# Patient Record
Sex: FEMALE | Race: WHITE | Employment: UNEMPLOYED | URBAN - METROPOLITAN AREA
[De-identification: names, ages, dates, MRNs, and addresses within clinical notes are randomized per-mention and may not be internally consistent; named-entity substitution may affect disease eponyms.]

---

## 2020-04-21 ENCOUNTER — HOSPITAL ENCOUNTER (INPATIENT)
Age: 25
LOS: 3 days | Discharge: HOME | DRG: 566 | End: 2020-04-24
Attending: EMERGENCY MEDICINE | Admitting: FAMILY MEDICINE
Payer: MEDICARE

## 2020-04-21 PROBLEM — R11.2 NAUSEA AND VOMITING: Status: ACTIVE | Noted: 2020-04-21

## 2020-04-21 LAB
ALBUMIN SERPL-MCNC: 4.3 GM/DL (ref 3.4–5)
ALP BLD-CCNC: 70 IU/L (ref 40–129)
ALT SERPL-CCNC: 22 U/L (ref 10–40)
ANION GAP SERPL CALCULATED.3IONS-SCNC: 16 MMOL/L (ref 4–16)
AST SERPL-CCNC: 28 IU/L (ref 15–37)
BANDED NEUTROPHILS ABSOLUTE COUNT: 0.63 K/CU MM
BANDED NEUTROPHILS RELATIVE PERCENT: 4 % (ref 5–11)
BILIRUB SERPL-MCNC: 0.4 MG/DL (ref 0–1)
BUN BLDV-MCNC: 11 MG/DL (ref 6–23)
CALCIUM SERPL-MCNC: 9.3 MG/DL (ref 8.3–10.6)
CHLORIDE BLD-SCNC: 101 MMOL/L (ref 99–110)
CO2: 17 MMOL/L (ref 21–32)
CREAT SERPL-MCNC: 0.6 MG/DL (ref 0.6–1.1)
DIFFERENTIAL TYPE: ABNORMAL
GFR AFRICAN AMERICAN: >60 ML/MIN/1.73M2
GFR NON-AFRICAN AMERICAN: >60 ML/MIN/1.73M2
GLUCOSE BLD-MCNC: 138 MG/DL (ref 70–99)
GONADOTROPIN, CHORIONIC (HCG) QUANT: NORMAL UIU/ML
HCT VFR BLD CALC: 40.6 % (ref 37–47)
HEMOGLOBIN: 13.8 GM/DL (ref 12.5–16)
LIPASE: 32 IU/L (ref 13–60)
LYMPHOCYTES ABSOLUTE: 0.6 K/CU MM
LYMPHOCYTES RELATIVE PERCENT: 4 % (ref 24–44)
MCH RBC QN AUTO: 31.5 PG (ref 27–31)
MCHC RBC AUTO-ENTMCNC: 34 % (ref 32–36)
MCV RBC AUTO: 92.7 FL (ref 78–100)
MONOCYTES ABSOLUTE: 0.8 K/CU MM
MONOCYTES RELATIVE PERCENT: 5 % (ref 0–4)
PDW BLD-RTO: 13.3 % (ref 11.7–14.9)
PLATELET # BLD: 240 K/CU MM (ref 140–440)
PMV BLD AUTO: 11.6 FL (ref 7.5–11.1)
POTASSIUM SERPL-SCNC: 4.1 MMOL/L (ref 3.5–5.1)
RBC # BLD: 4.38 M/CU MM (ref 4.2–5.4)
RBC # BLD: SLIGHT 10*6/UL
SEGMENTED NEUTROPHILS ABSOLUTE COUNT: 13.8 K/CU MM
SEGMENTED NEUTROPHILS RELATIVE PERCENT: 87 % (ref 36–66)
SODIUM BLD-SCNC: 134 MMOL/L (ref 135–145)
TOTAL PROTEIN: 6.9 GM/DL (ref 6.4–8.2)
TROPONIN T: <0.01 NG/ML
TROPONIN T: <0.01 NG/ML
WBC # BLD: 15.8 K/CU MM (ref 4–10.5)
WBC # BLD: ABNORMAL 10*3/UL

## 2020-04-21 PROCEDURE — 6360000002 HC RX W HCPCS: Performed by: INTERNAL MEDICINE

## 2020-04-21 PROCEDURE — 6370000000 HC RX 637 (ALT 250 FOR IP): Performed by: OBSTETRICS & GYNECOLOGY

## 2020-04-21 PROCEDURE — 6360000002 HC RX W HCPCS: Performed by: OBSTETRICS & GYNECOLOGY

## 2020-04-21 PROCEDURE — 6360000002 HC RX W HCPCS: Performed by: EMERGENCY MEDICINE

## 2020-04-21 PROCEDURE — 99285 EMERGENCY DEPT VISIT HI MDM: CPT

## 2020-04-21 PROCEDURE — 2000000000 HC ICU R&B

## 2020-04-21 PROCEDURE — 84484 ASSAY OF TROPONIN QUANT: CPT

## 2020-04-21 PROCEDURE — 93005 ELECTROCARDIOGRAM TRACING: CPT | Performed by: EMERGENCY MEDICINE

## 2020-04-21 PROCEDURE — 2580000003 HC RX 258: Performed by: INTERNAL MEDICINE

## 2020-04-21 PROCEDURE — 6370000000 HC RX 637 (ALT 250 FOR IP): Performed by: EMERGENCY MEDICINE

## 2020-04-21 PROCEDURE — 96372 THER/PROPH/DIAG INJ SC/IM: CPT

## 2020-04-21 PROCEDURE — 93010 ELECTROCARDIOGRAM REPORT: CPT | Performed by: INTERNAL MEDICINE

## 2020-04-21 PROCEDURE — 2580000003 HC RX 258: Performed by: EMERGENCY MEDICINE

## 2020-04-21 PROCEDURE — 85007 BL SMEAR W/DIFF WBC COUNT: CPT

## 2020-04-21 PROCEDURE — 96375 TX/PRO/DX INJ NEW DRUG ADDON: CPT

## 2020-04-21 PROCEDURE — 96365 THER/PROPH/DIAG IV INF INIT: CPT

## 2020-04-21 PROCEDURE — 1200000000 HC SEMI PRIVATE

## 2020-04-21 PROCEDURE — 85027 COMPLETE CBC AUTOMATED: CPT

## 2020-04-21 PROCEDURE — 83690 ASSAY OF LIPASE: CPT

## 2020-04-21 PROCEDURE — 6370000000 HC RX 637 (ALT 250 FOR IP): Performed by: FAMILY MEDICINE

## 2020-04-21 PROCEDURE — 94761 N-INVAS EAR/PLS OXIMETRY MLT: CPT

## 2020-04-21 PROCEDURE — 80053 COMPREHEN METABOLIC PANEL: CPT

## 2020-04-21 PROCEDURE — 84702 CHORIONIC GONADOTROPIN TEST: CPT

## 2020-04-21 RX ORDER — SODIUM CHLORIDE 0.9 % (FLUSH) 0.9 %
10 SYRINGE (ML) INJECTION PRN
Status: DISCONTINUED | OUTPATIENT
Start: 2020-04-21 | End: 2020-04-24 | Stop reason: HOSPADM

## 2020-04-21 RX ORDER — LANOLIN ALCOHOL/MO/W.PET/CERES
25 CREAM (GRAM) TOPICAL 2 TIMES DAILY
Status: DISCONTINUED | OUTPATIENT
Start: 2020-04-21 | End: 2020-04-24 | Stop reason: HOSPADM

## 2020-04-21 RX ORDER — SODIUM CHLORIDE, SODIUM LACTATE, POTASSIUM CHLORIDE, AND CALCIUM CHLORIDE .6; .31; .03; .02 G/100ML; G/100ML; G/100ML; G/100ML
1000 INJECTION, SOLUTION INTRAVENOUS ONCE
Status: COMPLETED | OUTPATIENT
Start: 2020-04-21 | End: 2020-04-21

## 2020-04-21 RX ORDER — METOCLOPRAMIDE HYDROCHLORIDE 5 MG/ML
10 INJECTION INTRAMUSCULAR; INTRAVENOUS EVERY 6 HOURS PRN
Status: DISCONTINUED | OUTPATIENT
Start: 2020-04-21 | End: 2020-04-24 | Stop reason: HOSPADM

## 2020-04-21 RX ORDER — LANOLIN ALCOHOL/MO/W.PET/CERES
25 CREAM (GRAM) TOPICAL EVERY 8 HOURS
Status: DISCONTINUED | OUTPATIENT
Start: 2020-04-21 | End: 2020-04-21

## 2020-04-21 RX ORDER — PROMETHAZINE HYDROCHLORIDE 25 MG/ML
25 INJECTION, SOLUTION INTRAMUSCULAR; INTRAVENOUS ONCE
Status: COMPLETED | OUTPATIENT
Start: 2020-04-21 | End: 2020-04-21

## 2020-04-21 RX ORDER — ACETAMINOPHEN 325 MG/1
650 TABLET ORAL EVERY 6 HOURS PRN
Status: DISCONTINUED | OUTPATIENT
Start: 2020-04-21 | End: 2020-04-24 | Stop reason: HOSPADM

## 2020-04-21 RX ORDER — ONDANSETRON 2 MG/ML
4 INJECTION INTRAMUSCULAR; INTRAVENOUS EVERY 6 HOURS PRN
Status: DISCONTINUED | OUTPATIENT
Start: 2020-04-21 | End: 2020-04-24 | Stop reason: HOSPADM

## 2020-04-21 RX ORDER — ONDANSETRON 2 MG/ML
4 INJECTION INTRAMUSCULAR; INTRAVENOUS EVERY 6 HOURS PRN
Status: DISCONTINUED | OUTPATIENT
Start: 2020-04-21 | End: 2020-04-21

## 2020-04-21 RX ORDER — PROMETHAZINE HYDROCHLORIDE 25 MG/ML
12.5 INJECTION, SOLUTION INTRAMUSCULAR; INTRAVENOUS EVERY 6 HOURS PRN
Status: DISCONTINUED | OUTPATIENT
Start: 2020-04-21 | End: 2020-04-21

## 2020-04-21 RX ORDER — POLYETHYLENE GLYCOL 3350 17 G/17G
17 POWDER, FOR SOLUTION ORAL DAILY PRN
Status: DISCONTINUED | OUTPATIENT
Start: 2020-04-21 | End: 2020-04-24 | Stop reason: HOSPADM

## 2020-04-21 RX ORDER — PROCHLORPERAZINE EDISYLATE 5 MG/ML
10 INJECTION INTRAMUSCULAR; INTRAVENOUS EVERY 6 HOURS PRN
Status: DISCONTINUED | OUTPATIENT
Start: 2020-04-21 | End: 2020-04-21

## 2020-04-21 RX ORDER — PROMETHAZINE HYDROCHLORIDE 25 MG/ML
25 INJECTION, SOLUTION INTRAMUSCULAR; INTRAVENOUS EVERY 6 HOURS PRN
Status: DISCONTINUED | OUTPATIENT
Start: 2020-04-21 | End: 2020-04-23

## 2020-04-21 RX ORDER — CALCIUM CARBONATE 200(500)MG
500 TABLET,CHEWABLE ORAL 3 TIMES DAILY PRN
Status: DISCONTINUED | OUTPATIENT
Start: 2020-04-21 | End: 2020-04-24 | Stop reason: HOSPADM

## 2020-04-21 RX ORDER — MAGNESIUM HYDROXIDE/ALUMINUM HYDROXICE/SIMETHICONE 120; 1200; 1200 MG/30ML; MG/30ML; MG/30ML
30 SUSPENSION ORAL ONCE
Status: COMPLETED | OUTPATIENT
Start: 2020-04-21 | End: 2020-04-21

## 2020-04-21 RX ORDER — DIPHENHYDRAMINE HYDROCHLORIDE 50 MG/ML
25 INJECTION INTRAMUSCULAR; INTRAVENOUS ONCE
Status: COMPLETED | OUTPATIENT
Start: 2020-04-21 | End: 2020-04-21

## 2020-04-21 RX ORDER — PROMETHAZINE HYDROCHLORIDE 25 MG/ML
25 INJECTION, SOLUTION INTRAMUSCULAR; INTRAVENOUS EVERY 6 HOURS PRN
Status: DISCONTINUED | OUTPATIENT
Start: 2020-04-21 | End: 2020-04-21

## 2020-04-21 RX ORDER — METOCLOPRAMIDE HYDROCHLORIDE 5 MG/ML
10 INJECTION INTRAMUSCULAR; INTRAVENOUS ONCE
Status: COMPLETED | OUTPATIENT
Start: 2020-04-21 | End: 2020-04-21

## 2020-04-21 RX ORDER — SODIUM CHLORIDE 0.9 % (FLUSH) 0.9 %
10 SYRINGE (ML) INJECTION EVERY 12 HOURS SCHEDULED
Status: DISCONTINUED | OUTPATIENT
Start: 2020-04-21 | End: 2020-04-24 | Stop reason: HOSPADM

## 2020-04-21 RX ORDER — ACETAMINOPHEN 650 MG/1
650 SUPPOSITORY RECTAL EVERY 6 HOURS PRN
Status: DISCONTINUED | OUTPATIENT
Start: 2020-04-21 | End: 2020-04-24 | Stop reason: HOSPADM

## 2020-04-21 RX ORDER — DEXTROSE, SODIUM CHLORIDE, SODIUM LACTATE, POTASSIUM CHLORIDE, AND CALCIUM CHLORIDE 5; .6; .31; .03; .02 G/100ML; G/100ML; G/100ML; G/100ML; G/100ML
INJECTION, SOLUTION INTRAVENOUS CONTINUOUS
Status: DISCONTINUED | OUTPATIENT
Start: 2020-04-21 | End: 2020-04-23

## 2020-04-21 RX ORDER — PROMETHAZINE HYDROCHLORIDE 25 MG/1
12.5 TABLET ORAL EVERY 6 HOURS PRN
Status: DISCONTINUED | OUTPATIENT
Start: 2020-04-21 | End: 2020-04-21

## 2020-04-21 RX ADMIN — DOXYLAMINE SUCCINATE 25 MG: 25 TABLET ORAL at 13:08

## 2020-04-21 RX ADMIN — PROMETHAZINE HYDROCHLORIDE 25 MG: 25 INJECTION INTRAMUSCULAR; INTRAVENOUS at 23:31

## 2020-04-21 RX ADMIN — PROMETHAZINE HYDROCHLORIDE 25 MG: 25 INJECTION INTRAMUSCULAR; INTRAVENOUS at 17:25

## 2020-04-21 RX ADMIN — ONDANSETRON HYDROCHLORIDE 4 MG: 2 SOLUTION INTRAMUSCULAR; INTRAVENOUS at 15:39

## 2020-04-21 RX ADMIN — METOCLOPRAMIDE 10 MG: 5 INJECTION, SOLUTION INTRAMUSCULAR; INTRAVENOUS at 05:09

## 2020-04-21 RX ADMIN — SODIUM CHLORIDE, POTASSIUM CHLORIDE, SODIUM LACTATE AND CALCIUM CHLORIDE 1000 ML: 600; 310; 30; 20 INJECTION, SOLUTION INTRAVENOUS at 05:09

## 2020-04-21 RX ADMIN — ALUMINUM HYDROXIDE, MAGNESIUM HYDROXIDE, AND SIMETHICONE 30 ML: 200; 200; 20 SUSPENSION ORAL at 06:32

## 2020-04-21 RX ADMIN — METOCLOPRAMIDE 10 MG: 5 INJECTION, SOLUTION INTRAMUSCULAR; INTRAVENOUS at 19:07

## 2020-04-21 RX ADMIN — SODIUM CHLORIDE, SODIUM LACTATE, POTASSIUM CHLORIDE, CALCIUM CHLORIDE AND DEXTROSE MONOHYDRATE: 5; 600; 310; 30; 20 INJECTION, SOLUTION INTRAVENOUS at 23:36

## 2020-04-21 RX ADMIN — METOCLOPRAMIDE 10 MG: 5 INJECTION, SOLUTION INTRAMUSCULAR; INTRAVENOUS at 13:08

## 2020-04-21 RX ADMIN — ONDANSETRON HYDROCHLORIDE 4 MG: 2 SOLUTION INTRAMUSCULAR; INTRAVENOUS at 20:58

## 2020-04-21 RX ADMIN — SODIUM CHLORIDE, SODIUM LACTATE, POTASSIUM CHLORIDE, CALCIUM CHLORIDE AND DEXTROSE MONOHYDRATE: 5; 600; 310; 30; 20 INJECTION, SOLUTION INTRAVENOUS at 08:32

## 2020-04-21 RX ADMIN — DOXYLAMINE SUCCINATE 25 MG: 25 TABLET ORAL at 21:07

## 2020-04-21 RX ADMIN — ENOXAPARIN SODIUM 40 MG: 40 INJECTION SUBCUTANEOUS at 10:12

## 2020-04-21 RX ADMIN — ONDANSETRON HYDROCHLORIDE 4 MG: 2 SOLUTION INTRAMUSCULAR; INTRAVENOUS at 10:12

## 2020-04-21 RX ADMIN — DIPHENHYDRAMINE HYDROCHLORIDE 25 MG: 50 INJECTION, SOLUTION INTRAMUSCULAR; INTRAVENOUS at 05:09

## 2020-04-21 RX ADMIN — PROMETHAZINE HYDROCHLORIDE 25 MG: 25 INJECTION INTRAMUSCULAR; INTRAVENOUS at 06:32

## 2020-04-21 RX ADMIN — SODIUM CHLORIDE, SODIUM LACTATE, POTASSIUM CHLORIDE, CALCIUM CHLORIDE AND DEXTROSE MONOHYDRATE: 5; 600; 310; 30; 20 INJECTION, SOLUTION INTRAVENOUS at 15:39

## 2020-04-21 RX ADMIN — Medication 25 MG: at 10:12

## 2020-04-21 RX ADMIN — Medication 25 MG: at 21:07

## 2020-04-21 ASSESSMENT — PAIN DESCRIPTION - PROGRESSION
CLINICAL_PROGRESSION: NOT CHANGED
CLINICAL_PROGRESSION: NOT CHANGED

## 2020-04-21 ASSESSMENT — PAIN DESCRIPTION - LOCATION
LOCATION: ABDOMEN
LOCATION: ABDOMEN
LOCATION: CHEST

## 2020-04-21 ASSESSMENT — PAIN SCALES - GENERAL
PAINLEVEL_OUTOF10: 6
PAINLEVEL_OUTOF10: 0
PAINLEVEL_OUTOF10: 7
PAINLEVEL_OUTOF10: 4
PAINLEVEL_OUTOF10: 0

## 2020-04-21 ASSESSMENT — PAIN DESCRIPTION - ORIENTATION
ORIENTATION: RIGHT;LEFT;MID;LOWER
ORIENTATION: RIGHT;LEFT;MID;LOWER

## 2020-04-21 ASSESSMENT — PAIN DESCRIPTION - PAIN TYPE
TYPE: ACUTE PAIN
TYPE: ACUTE PAIN

## 2020-04-21 ASSESSMENT — PAIN DESCRIPTION - FREQUENCY
FREQUENCY: INTERMITTENT
FREQUENCY: INTERMITTENT

## 2020-04-21 ASSESSMENT — PAIN DESCRIPTION - ONSET
ONSET: ON-GOING
ONSET: GRADUAL

## 2020-04-21 ASSESSMENT — PAIN DESCRIPTION - DESCRIPTORS
DESCRIPTORS: CRAMPING
DESCRIPTORS: CRAMPING

## 2020-04-21 ASSESSMENT — PAIN - FUNCTIONAL ASSESSMENT
PAIN_FUNCTIONAL_ASSESSMENT: ACTIVITIES ARE NOT PREVENTED
PAIN_FUNCTIONAL_ASSESSMENT: ACTIVITIES ARE NOT PREVENTED

## 2020-04-21 NOTE — H&P
Intravenous, Continuous, Michael Clayton MD    History of present illness     Chief Complaint: Emesis and Chest Pain      Harjeet Bailey is a 25 y.o.  female  who presents with nausea and vomiting. Pt is  at about 8 weeks with her current pregnancy. Pt states that the symptoms started last night and occurred this morning as well. She denies having this with her first 2 pregnancies. Currently feels a bit better. Review of Systems : Ten point ROS reviewed and negative, unless as noted above per HPI       Objective: Intake/Output Summary (Last 24 hours) at 2020 0820  Last data filed at 2020 4396  Gross per 24 hour   Intake 1000 ml   Output --   Net 1000 ml      Vitals:   Vitals:    20 0505   BP:    Pulse:    Resp:    Temp: 98.2 °F (36.8 °C)   SpO2:      Physical Exam:   Gen:  Slightly nauseated   Head/Eyes:  Normocephalic atraumatic, EOMI   NECK:   symmetrical, trachea midline  LUNGS: Normal Effort   CARDIOVASCULAR:  Normal rate  ABDOMEN:  non distended  MUSCULOSKELETAL:  ROM WNL  NEUROLOGIC: Alert and Oriented,  Cranial nerves II-XII are grossly intact. SKIN:  no bruising or bleeding, normal skin color,  no redness      Past Medical History:    History reviewed. No pertinent past medical history. PSHX:  has a past surgical history that includes Tonsillectomy.     Allergies: No Known Allergies    FAM HX: Reviewed and non-contributory  Soc HX:   Social History     Socioeconomic History    Marital status: Single     Spouse name: None    Number of children: None    Years of education: None    Highest education level: None   Occupational History    None   Social Needs    Financial resource strain: None    Food insecurity     Worry: None     Inability: None    Transportation needs     Medical: None     Non-medical: None   Tobacco Use    Smoking status: Current Every Day Smoker     Packs/day: 0.50     Years: 1.00     Pack years: 0.50    Smokeless tobacco: Never Used   Substance and

## 2020-04-21 NOTE — ED PROVIDER NOTES
Triage Chief Complaint:   Emesis and Chest Pain    [place]:  Neli Alvarado is a 25 y.o. female that presents via EMS for main complaint of nausea vomiting. The patient is a G3, P2 at about 8 weeks. She reports that starting yesterday morning she has had intractable nausea and bilious, nonbloody emesis. She thinks that she may have vomited about 100 times. She was seen at [facility] yesterday and prescribed Zofran but this has not been helping. She did have an ultrasound that showed intrauterine pregnancy with subchorionic hemorrhage. She has been having spotting for the past few days. Denies any large vaginal bleeding. She states that she has some associated cramping epigastric pain. She states that she also woke up this morning at 4 AM with some chest tightness and mild dyspnea. Denies any headache, fever, cough, urinary symptoms, sick contacts or recent travel. EMS reported that the patient's pulse was in the 40s and blood pressures in the 53S systolic in route. They state that she was in and out of consciousness. ROS:  At least 14 systems reviewed and otherwise acutely negative except as in the [address] History reviewed. No pertinent past medical history. Past Surgical History:   Procedure Laterality Date    TONSILLECTOMY       History reviewed. No pertinent family history.   Social History     Socioeconomic History    Marital status: Single     Spouse name: Not on file    Number of children: Not on file    Years of education: Not on file    Highest education level: Not on file   Occupational History    Not on file   Social Needs    Financial resource strain: Not on file    Food insecurity     Worry: Not on file     Inability: Not on file    Transportation needs     Medical: Not on file     Non-medical: Not on file   Tobacco Use    Smoking status: Current Every Day Smoker     Packs/day: 0.50     Years: 1.00     Pack years: 0.50    Smokeless tobacco: Never Used   Substance and Sexual Activity    - 16   Lipase   Result Value Ref Range    Lipase 32 13 - 60 IU/L   Troponin   Result Value Ref Range    Troponin T <0.010 <0.01 NG/ML      Radiographs (if obtained):  [] The following radiograph was interpreted by myself in the absence of a radiologist:  [] Radiologist's Report Reviewed:    EKG (if obtained): (All EKG's are interpreted by myself in the absence of a cardiologist)    MDM:  Plan of care is discussed thoroughly with the patient and family if present. If performed, all imaging and lab work also discussed with patient. All relevant prior results and chart reviewed if available. Patient presents as above. On arrival, the patient has normal vital signs. She has benign abdominal exam.  Main complaint is intractable nausea and vomiting which is likely secondary to hyperemesis of pregnancy. She does have some chest tightness. EKG is nonischemic. Overall symptomatology not consistent with ACS, pulmonary embolism or other acute intrathoracic process. I suspect the chest discomfort could be from repeated episodes of vomiting. Patient started on IV fluids and given Reglan and Benadryl. On reevaluation, the patient states that she feels much better. Vital signs remain normal.  Patient does have leukocytosis which is likely secondary to repeated episodes of vomiting. Remainder of labs are pending at this time. Patient's metabolic work-up is largely unremarkable. Lipase is normal.  Troponin is within normal limits. When I went back to see the patient, she is actively vomiting again. She is additionally given Maalox and Phenergan. I feel that given her intractable vomiting at this point, second hospital visit that she should be admitted for IV fluids and further management. She is agreeable with this plan of care. Clinical Impression:  1.  Vomiting during pregnancy      (Please note that portions of this note may have been completed with a voice recognition program. Efforts were made

## 2020-04-22 LAB
ANION GAP SERPL CALCULATED.3IONS-SCNC: 14 MMOL/L (ref 4–16)
BASOPHILS ABSOLUTE: 0 K/CU MM
BASOPHILS RELATIVE PERCENT: 0.2 % (ref 0–1)
BUN BLDV-MCNC: 8 MG/DL (ref 6–23)
CALCIUM SERPL-MCNC: 8.7 MG/DL (ref 8.3–10.6)
CHLORIDE BLD-SCNC: 106 MMOL/L (ref 99–110)
CO2: 21 MMOL/L (ref 21–32)
CREAT SERPL-MCNC: 0.6 MG/DL (ref 0.6–1.1)
DIFFERENTIAL TYPE: ABNORMAL
EOSINOPHILS ABSOLUTE: 0 K/CU MM
EOSINOPHILS RELATIVE PERCENT: 0 % (ref 0–3)
GFR AFRICAN AMERICAN: >60 ML/MIN/1.73M2
GFR NON-AFRICAN AMERICAN: >60 ML/MIN/1.73M2
GLUCOSE BLD-MCNC: 131 MG/DL (ref 70–99)
HCT VFR BLD CALC: 38.2 % (ref 37–47)
HEMOGLOBIN: 13 GM/DL (ref 12.5–16)
IMMATURE NEUTROPHIL %: 0.3 % (ref 0–0.43)
LYMPHOCYTES ABSOLUTE: 1.3 K/CU MM
LYMPHOCYTES RELATIVE PERCENT: 10.2 % (ref 24–44)
MAGNESIUM: 1.9 MG/DL (ref 1.8–2.4)
MCH RBC QN AUTO: 31.9 PG (ref 27–31)
MCHC RBC AUTO-ENTMCNC: 34 % (ref 32–36)
MCV RBC AUTO: 93.9 FL (ref 78–100)
MONOCYTES ABSOLUTE: 0.5 K/CU MM
MONOCYTES RELATIVE PERCENT: 4 % (ref 0–4)
NUCLEATED RBC %: 0 %
PDW BLD-RTO: 13.4 % (ref 11.7–14.9)
PLATELET # BLD: 193 K/CU MM (ref 140–440)
PMV BLD AUTO: 11.2 FL (ref 7.5–11.1)
POTASSIUM SERPL-SCNC: 3.4 MMOL/L (ref 3.5–5.1)
RBC # BLD: 4.07 M/CU MM (ref 4.2–5.4)
SEGMENTED NEUTROPHILS ABSOLUTE COUNT: 11.3 K/CU MM
SEGMENTED NEUTROPHILS RELATIVE PERCENT: 85.3 % (ref 36–66)
SODIUM BLD-SCNC: 141 MMOL/L (ref 135–145)
TOTAL IMMATURE NEUTOROPHIL: 0.04 K/CU MM
TOTAL NUCLEATED RBC: 0 K/CU MM
TOTAL RETICULOCYTE COUNT: 0.08 K/CU MM
WBC # BLD: 13.2 K/CU MM (ref 4–10.5)

## 2020-04-22 PROCEDURE — 6370000000 HC RX 637 (ALT 250 FOR IP): Performed by: INTERNAL MEDICINE

## 2020-04-22 PROCEDURE — 80048 BASIC METABOLIC PNL TOTAL CA: CPT

## 2020-04-22 PROCEDURE — 36415 COLL VENOUS BLD VENIPUNCTURE: CPT

## 2020-04-22 PROCEDURE — 83735 ASSAY OF MAGNESIUM: CPT

## 2020-04-22 PROCEDURE — 1200000000 HC SEMI PRIVATE

## 2020-04-22 PROCEDURE — 6360000002 HC RX W HCPCS: Performed by: NURSE PRACTITIONER

## 2020-04-22 PROCEDURE — 6370000000 HC RX 637 (ALT 250 FOR IP): Performed by: NURSE PRACTITIONER

## 2020-04-22 PROCEDURE — 6360000002 HC RX W HCPCS: Performed by: OBSTETRICS & GYNECOLOGY

## 2020-04-22 PROCEDURE — 6360000002 HC RX W HCPCS: Performed by: INTERNAL MEDICINE

## 2020-04-22 PROCEDURE — 85025 COMPLETE CBC W/AUTO DIFF WBC: CPT

## 2020-04-22 PROCEDURE — 6370000000 HC RX 637 (ALT 250 FOR IP): Performed by: FAMILY MEDICINE

## 2020-04-22 PROCEDURE — 76937 US GUIDE VASCULAR ACCESS: CPT

## 2020-04-22 PROCEDURE — 94761 N-INVAS EAR/PLS OXIMETRY MLT: CPT

## 2020-04-22 PROCEDURE — 2580000003 HC RX 258: Performed by: INTERNAL MEDICINE

## 2020-04-22 PROCEDURE — 6370000000 HC RX 637 (ALT 250 FOR IP): Performed by: OBSTETRICS & GYNECOLOGY

## 2020-04-22 RX ORDER — POTASSIUM CHLORIDE 20 MEQ/1
40 TABLET, EXTENDED RELEASE ORAL ONCE
Status: COMPLETED | OUTPATIENT
Start: 2020-04-22 | End: 2020-04-22

## 2020-04-22 RX ORDER — DIPHENHYDRAMINE HYDROCHLORIDE 50 MG/ML
12.5 INJECTION INTRAMUSCULAR; INTRAVENOUS ONCE
Status: COMPLETED | OUTPATIENT
Start: 2020-04-22 | End: 2020-04-22

## 2020-04-22 RX ADMIN — METOCLOPRAMIDE 10 MG: 5 INJECTION, SOLUTION INTRAMUSCULAR; INTRAVENOUS at 05:50

## 2020-04-22 RX ADMIN — DIPHENHYDRAMINE HYDROCHLORIDE 12.5 MG: 50 INJECTION INTRAMUSCULAR; INTRAVENOUS at 21:07

## 2020-04-22 RX ADMIN — PROMETHAZINE HYDROCHLORIDE 25 MG: 25 INJECTION INTRAMUSCULAR; INTRAVENOUS at 23:13

## 2020-04-22 RX ADMIN — PROMETHAZINE HYDROCHLORIDE 25 MG: 25 INJECTION INTRAMUSCULAR; INTRAVENOUS at 16:55

## 2020-04-22 RX ADMIN — SODIUM CHLORIDE, SODIUM LACTATE, POTASSIUM CHLORIDE, CALCIUM CHLORIDE AND DEXTROSE MONOHYDRATE: 5; 600; 310; 30; 20 INJECTION, SOLUTION INTRAVENOUS at 08:59

## 2020-04-22 RX ADMIN — METOCLOPRAMIDE 10 MG: 5 INJECTION, SOLUTION INTRAMUSCULAR; INTRAVENOUS at 18:34

## 2020-04-22 RX ADMIN — PROMETHAZINE HYDROCHLORIDE 25 MG: 25 INJECTION INTRAMUSCULAR; INTRAVENOUS at 10:55

## 2020-04-22 RX ADMIN — Medication 25 MG: at 08:35

## 2020-04-22 RX ADMIN — ENOXAPARIN SODIUM 40 MG: 40 INJECTION SUBCUTANEOUS at 08:35

## 2020-04-22 RX ADMIN — DOXYLAMINE SUCCINATE 25 MG: 25 TABLET ORAL at 08:35

## 2020-04-22 RX ADMIN — CALCIUM CARBONATE 500 MG: 500 TABLET, CHEWABLE ORAL at 00:37

## 2020-04-22 RX ADMIN — ONDANSETRON HYDROCHLORIDE 4 MG: 2 SOLUTION INTRAMUSCULAR; INTRAVENOUS at 02:35

## 2020-04-22 RX ADMIN — METOCLOPRAMIDE 10 MG: 5 INJECTION, SOLUTION INTRAMUSCULAR; INTRAVENOUS at 12:00

## 2020-04-22 RX ADMIN — BENZOCAINE AND MENTHOL 1 LOZENGE: 15; 3.6 LOZENGE ORAL at 05:50

## 2020-04-22 RX ADMIN — METOCLOPRAMIDE 10 MG: 5 INJECTION, SOLUTION INTRAMUSCULAR; INTRAVENOUS at 00:37

## 2020-04-22 RX ADMIN — POTASSIUM CHLORIDE 40 MEQ: 1500 TABLET, EXTENDED RELEASE ORAL at 08:54

## 2020-04-22 RX ADMIN — ONDANSETRON HYDROCHLORIDE 4 MG: 2 SOLUTION INTRAMUSCULAR; INTRAVENOUS at 20:56

## 2020-04-22 RX ADMIN — CALCIUM CARBONATE 500 MG: 500 TABLET, CHEWABLE ORAL at 19:35

## 2020-04-22 RX ADMIN — ONDANSETRON HYDROCHLORIDE 4 MG: 2 SOLUTION INTRAMUSCULAR; INTRAVENOUS at 08:35

## 2020-04-22 RX ADMIN — SODIUM CHLORIDE, PRESERVATIVE FREE 10 ML: 5 INJECTION INTRAVENOUS at 09:00

## 2020-04-22 RX ADMIN — PROMETHAZINE HYDROCHLORIDE 25 MG: 25 INJECTION INTRAMUSCULAR; INTRAVENOUS at 04:51

## 2020-04-22 RX ADMIN — ONDANSETRON HYDROCHLORIDE 4 MG: 2 SOLUTION INTRAMUSCULAR; INTRAVENOUS at 14:46

## 2020-04-22 RX ADMIN — ACETAMINOPHEN 650 MG: 650 SUPPOSITORY RECTAL at 21:54

## 2020-04-22 ASSESSMENT — PAIN SCALES - GENERAL
PAINLEVEL_OUTOF10: 7
PAINLEVEL_OUTOF10: 0
PAINLEVEL_OUTOF10: 6
PAINLEVEL_OUTOF10: 7

## 2020-04-22 NOTE — PROGRESS NOTES
Hospitalist Progress Note      Name:  Shannan Jane /Age/Sex: 1995  (25 y.o. female)   MRN & CSN:  [id number] & [id number] Admission Date/Time: 2020  5:02 AM   Location:  -A PCP: No primary care provider on file. Shannan Jane is a 25 y.o.  female  who presents with Emesis and Chest Pain      Assessment and Plan:   Hyperemesis Gravidarum, , GA approx 8 weeks  - pyridoxine + doxylamine  - IVF decrease  - advance to full liquids today  - antiemetics prn  - ob/gyn consulted     Chest Pain   -stable  - likely 2/2 to vomiting  - EKG non acute  - trend trops: neg    HypoK  - replace            Diet DIET FULL LIQUID;   Code Status Full Code     Medications:   Medications:    sodium chloride flush  10 mL Intravenous 2 times per day    enoxaparin  40 mg Subcutaneous Daily    doxyLAMINE succinate  25 mg Oral BID    pyridoxine  25 mg Oral BID      Infusions:    dextrose 5% in lactated ringers 125 mL/hr at 20 0859     PRN Meds: benzocaine-menthol, 1 lozenge, Q2H PRN  sodium chloride flush, 10 mL, PRN  acetaminophen, 650 mg, Q6H PRN    Or  acetaminophen, 650 mg, Q6H PRN  polyethylene glycol, 17 g, Daily PRN  metoclopramide, 10 mg, Q6H PRN  promethazine, 25 mg, Q6H PRN  ondansetron, 4 mg, Q6H PRN  calcium carbonate, 500 mg, TID PRN      Subjective:     States she still feels nauseated   Objective:        Intake/Output Summary (Last 24 hours) at 2020 0947  Last data filed at 2020 0600  Gross per 24 hour   Intake 3311 ml   Output 375 ml   Net 2936 ml      Vitals:   Vitals:    20 0844   BP: 113/61   Pulse: 66   Resp: 19   Temp: 98.6 °F (37 °C)   SpO2: 97%     Physical Exam:   Gen:  awake, alert, no apparent distress  Head/Eyes:  Normocephalic atraumatic, EOMI   NECK:   symmetrical, trachea midline  LUNGS: Normal Effort   CARDIOVASCULAR:  Normal rate  ABDOMEN:  non distended  MUSCULOSKELETAL:  ROM WNL  NEUROLOGIC: Alert and Oriented,  Cranial nerves II-XII are grossly

## 2020-04-23 LAB
ANION GAP SERPL CALCULATED.3IONS-SCNC: 13 MMOL/L (ref 4–16)
BUN BLDV-MCNC: 8 MG/DL (ref 6–23)
CALCIUM SERPL-MCNC: 9 MG/DL (ref 8.3–10.6)
CHLORIDE BLD-SCNC: 102 MMOL/L (ref 99–110)
CO2: 21 MMOL/L (ref 21–32)
CREAT SERPL-MCNC: 0.7 MG/DL (ref 0.6–1.1)
GFR AFRICAN AMERICAN: >60 ML/MIN/1.73M2
GFR NON-AFRICAN AMERICAN: >60 ML/MIN/1.73M2
GLUCOSE BLD-MCNC: 100 MG/DL (ref 70–99)
MAGNESIUM: 1.9 MG/DL (ref 1.8–2.4)
POTASSIUM SERPL-SCNC: 3.3 MMOL/L (ref 3.5–5.1)
SODIUM BLD-SCNC: 136 MMOL/L (ref 135–145)

## 2020-04-23 PROCEDURE — 2580000003 HC RX 258: Performed by: FAMILY MEDICINE

## 2020-04-23 PROCEDURE — 6360000002 HC RX W HCPCS: Performed by: NURSE PRACTITIONER

## 2020-04-23 PROCEDURE — 1200000000 HC SEMI PRIVATE

## 2020-04-23 PROCEDURE — 6360000002 HC RX W HCPCS: Performed by: OBSTETRICS & GYNECOLOGY

## 2020-04-23 PROCEDURE — 2580000003 HC RX 258: Performed by: INTERNAL MEDICINE

## 2020-04-23 PROCEDURE — 6370000000 HC RX 637 (ALT 250 FOR IP): Performed by: OBSTETRICS & GYNECOLOGY

## 2020-04-23 PROCEDURE — 36415 COLL VENOUS BLD VENIPUNCTURE: CPT

## 2020-04-23 PROCEDURE — 94761 N-INVAS EAR/PLS OXIMETRY MLT: CPT

## 2020-04-23 PROCEDURE — 6370000000 HC RX 637 (ALT 250 FOR IP): Performed by: NURSE PRACTITIONER

## 2020-04-23 PROCEDURE — 80048 BASIC METABOLIC PNL TOTAL CA: CPT

## 2020-04-23 PROCEDURE — 6360000002 HC RX W HCPCS: Performed by: INTERNAL MEDICINE

## 2020-04-23 PROCEDURE — 2500000003 HC RX 250 WO HCPCS: Performed by: FAMILY MEDICINE

## 2020-04-23 PROCEDURE — 83735 ASSAY OF MAGNESIUM: CPT

## 2020-04-23 RX ORDER — DEXTROSE, SODIUM CHLORIDE, AND POTASSIUM CHLORIDE 5; .45; .3 G/100ML; G/100ML; G/100ML
INJECTION INTRAVENOUS CONTINUOUS
Status: DISCONTINUED | OUTPATIENT
Start: 2020-04-23 | End: 2020-04-24

## 2020-04-23 RX ORDER — DIPHENHYDRAMINE HYDROCHLORIDE 50 MG/ML
12.5 INJECTION INTRAMUSCULAR; INTRAVENOUS EVERY 6 HOURS PRN
Status: DISCONTINUED | OUTPATIENT
Start: 2020-04-23 | End: 2020-04-24 | Stop reason: HOSPADM

## 2020-04-23 RX ORDER — DIPHENHYDRAMINE HYDROCHLORIDE 50 MG/ML
12.5 INJECTION INTRAMUSCULAR; INTRAVENOUS ONCE
Status: COMPLETED | OUTPATIENT
Start: 2020-04-23 | End: 2020-04-23

## 2020-04-23 RX ORDER — PROCHLORPERAZINE EDISYLATE 5 MG/ML
10 INJECTION INTRAMUSCULAR; INTRAVENOUS EVERY 6 HOURS PRN
Status: DISCONTINUED | OUTPATIENT
Start: 2020-04-23 | End: 2020-04-24 | Stop reason: HOSPADM

## 2020-04-23 RX ADMIN — PROMETHAZINE HYDROCHLORIDE 25 MG: 25 INJECTION INTRAMUSCULAR; INTRAVENOUS at 05:07

## 2020-04-23 RX ADMIN — CALCIUM CARBONATE 500 MG: 500 TABLET, CHEWABLE ORAL at 02:50

## 2020-04-23 RX ADMIN — SODIUM CHLORIDE, PRESERVATIVE FREE 10 ML: 5 INJECTION INTRAVENOUS at 22:14

## 2020-04-23 RX ADMIN — ONDANSETRON HYDROCHLORIDE 4 MG: 2 SOLUTION INTRAMUSCULAR; INTRAVENOUS at 17:17

## 2020-04-23 RX ADMIN — METOCLOPRAMIDE 10 MG: 5 INJECTION, SOLUTION INTRAMUSCULAR; INTRAVENOUS at 11:37

## 2020-04-23 RX ADMIN — PROMETHAZINE HYDROCHLORIDE 25 MG: 25 INJECTION INTRAMUSCULAR; INTRAVENOUS at 17:03

## 2020-04-23 RX ADMIN — POTASSIUM CHLORIDE, DEXTROSE MONOHYDRATE AND SODIUM CHLORIDE: 300; 5; 450 INJECTION, SOLUTION INTRAVENOUS at 10:59

## 2020-04-23 RX ADMIN — SODIUM CHLORIDE, SODIUM LACTATE, POTASSIUM CHLORIDE, CALCIUM CHLORIDE AND DEXTROSE MONOHYDRATE: 5; 600; 310; 30; 20 INJECTION, SOLUTION INTRAVENOUS at 00:43

## 2020-04-23 RX ADMIN — ONDANSETRON HYDROCHLORIDE 4 MG: 2 SOLUTION INTRAMUSCULAR; INTRAVENOUS at 23:14

## 2020-04-23 RX ADMIN — CALCIUM CARBONATE 500 MG: 500 TABLET, CHEWABLE ORAL at 10:43

## 2020-04-23 RX ADMIN — DOXYLAMINE SUCCINATE 25 MG: 25 TABLET ORAL at 11:37

## 2020-04-23 RX ADMIN — PROCHLORPERAZINE EDISYLATE 10 MG: 5 INJECTION, SOLUTION INTRAMUSCULAR; INTRAVENOUS at 22:14

## 2020-04-23 RX ADMIN — ONDANSETRON HYDROCHLORIDE 4 MG: 2 SOLUTION INTRAMUSCULAR; INTRAVENOUS at 08:42

## 2020-04-23 RX ADMIN — DIPHENHYDRAMINE HYDROCHLORIDE 12.5 MG: 50 INJECTION, SOLUTION INTRAMUSCULAR; INTRAVENOUS at 22:14

## 2020-04-23 RX ADMIN — METOCLOPRAMIDE 10 MG: 5 INJECTION, SOLUTION INTRAMUSCULAR; INTRAVENOUS at 05:28

## 2020-04-23 RX ADMIN — ONDANSETRON HYDROCHLORIDE 4 MG: 2 SOLUTION INTRAMUSCULAR; INTRAVENOUS at 14:43

## 2020-04-23 RX ADMIN — METOCLOPRAMIDE 10 MG: 5 INJECTION, SOLUTION INTRAMUSCULAR; INTRAVENOUS at 20:49

## 2020-04-23 RX ADMIN — CALCIUM CARBONATE 500 MG: 500 TABLET, CHEWABLE ORAL at 14:18

## 2020-04-23 RX ADMIN — SODIUM CHLORIDE, PRESERVATIVE FREE 10 ML: 5 INJECTION INTRAVENOUS at 10:38

## 2020-04-23 RX ADMIN — METOCLOPRAMIDE 10 MG: 5 INJECTION, SOLUTION INTRAMUSCULAR; INTRAVENOUS at 00:36

## 2020-04-23 RX ADMIN — DIPHENHYDRAMINE HYDROCHLORIDE 12.5 MG: 50 INJECTION INTRAMUSCULAR; INTRAVENOUS at 03:35

## 2020-04-23 RX ADMIN — ENOXAPARIN SODIUM 40 MG: 40 INJECTION SUBCUTANEOUS at 11:37

## 2020-04-23 RX ADMIN — PROMETHAZINE HYDROCHLORIDE 25 MG: 25 INJECTION INTRAMUSCULAR; INTRAVENOUS at 11:08

## 2020-04-23 RX ADMIN — ONDANSETRON HYDROCHLORIDE 4 MG: 2 SOLUTION INTRAMUSCULAR; INTRAVENOUS at 02:47

## 2020-04-23 RX ADMIN — SODIUM CHLORIDE, PRESERVATIVE FREE 10 ML: 5 INJECTION INTRAVENOUS at 20:49

## 2020-04-23 ASSESSMENT — PAIN SCALES - GENERAL: PAINLEVEL_OUTOF10: 0

## 2020-04-23 NOTE — PLAN OF CARE
Problem: Discharge Planning:  Goal: Participates in care planning  Description: Participates in care planning  4/23/2020 1212 by Darci Reyes RN  Outcome: Ongoing  4/23/2020 0257 by Tarun Anaya RN  Outcome: Ongoing  Goal: Discharged to appropriate level of care  Description: Discharged to appropriate level of care  4/23/2020 1212 by Darci Reyes RN  Outcome: Ongoing  4/23/2020 0257 by Tarun Anaya RN  Outcome: Ongoing     Problem:  Activity Intolerance:  Goal: Ability to tolerate increased activity will improve  Description: Ability to tolerate increased activity will improve  4/23/2020 1212 by Darci Reyes RN  Outcome: Ongoing  4/23/2020 0257 by Tarun Anaya RN  Outcome: Ongoing     Problem: Anxiety/Stress:  Goal: Level of anxiety will decrease  Description: Level of anxiety will decrease  4/23/2020 1212 by Darci Reyes RN  Outcome: Ongoing  4/23/2020 0257 by Tarun Anaya RN  Outcome: Ongoing     Problem: Fluid Volume - Deficit:  Goal: Absence of fluid volume deficit signs and symptoms  Description: Absence of fluid volume deficit signs and symptoms  4/23/2020 1212 by Darci Reyes RN  Outcome: Ongoing  4/23/2020 0257 by Tarun Anaya RN  Outcome: Ongoing  Goal: Electrolytes within specified parameters  Description: Electrolytes within specified parameters  4/23/2020 1212 by Darci Reyes RN  Outcome: Ongoing  4/23/2020 0257 by Tarun Anaya RN  Outcome: Ongoing     Problem: Fluid Volume - Deficit:  Goal: Absence of fluid volume deficit signs and symptoms  Description: Absence of fluid volume deficit signs and symptoms  4/23/2020 1212 by Darci Reyes RN  Outcome: Ongoing  4/23/2020 0257 by Tarun Anaya RN  Outcome: Ongoing  Goal: Electrolytes within specified parameters  Description: Electrolytes within specified parameters  4/23/2020 1212 by Darci Reyes RN  Outcome: Ongoing  4/23/2020 0257 by Tarun Anaya RN  Outcome: Ongoing     Problem: Pain:  Goal: Pain level will decrease  Description: Pain level will decrease  4/23/2020 1212 by Sharla Britt RN  Outcome: Ongoing  4/23/2020 0257 by Bev Delarosa RN  Outcome: Ongoing  Goal: Control of acute pain  Description: Control of acute pain  4/23/2020 1212 by Sharla Britt RN  Outcome: Ongoing  4/23/2020 0257 by Bev Delarosa RN  Outcome: Ongoing  Goal: Control of chronic pain  Description: Control of chronic pain  4/23/2020 1212 by Sharla Britt RN  Outcome: Ongoing  4/23/2020 0257 by Bev Delarosa RN  Outcome: Ongoing  Goal: Patient's pain/discomfort is manageable  Description: Patient's pain/discomfort is manageable  4/23/2020 1212 by Sharla Britt RN  Outcome: Ongoing  4/23/2020 0257 by Bev Delarosa RN  Outcome: Ongoing     Problem: Nausea/Vomiting:  Goal: Absence of nausea/vomiting  Description: Absence of nausea/vomiting  4/23/2020 1212 by Sharla Britt RN  Outcome: Ongoing  4/23/2020 0257 by Bev Delarosa RN  Outcome: Ongoing  Goal: Able to drink  Description: Able to drink  4/23/2020 1212 by Sharla Britt RN  Outcome: Ongoing  4/23/2020 0257 by Bev Delarosa RN  Outcome: Ongoing  Goal: Able to eat  Description: Able to eat  4/23/2020 1212 by Sharla Britt RN  Outcome: Ongoing  4/23/2020 0257 by Bev Delarosa RN  Outcome: Ongoing  Goal: Ability to achieve adequate nutritional intake will improve  Description: Ability to achieve adequate nutritional intake will improve  4/23/2020 1212 by Sharla Britt RN  Outcome: Ongoing  4/23/2020 0257 by Bev Delarosa RN  Outcome: Ongoing     Problem: Nausea/Vomiting:  Goal: Absence of nausea/vomiting  Description: Absence of nausea/vomiting  4/23/2020 1212 by Sharla Britt RN  Outcome: Ongoing  4/23/2020 0257 by Bev Delarosa RN  Outcome: Ongoing  Goal: Able to drink  Description: Able to drink  4/23/2020 1212 by Sharla Britt RN  Outcome: Ongoing  4/23/2020 0257 by Bev Delarosa RN  Outcome: Ongoing  Goal: Able to eat  Description: Able to eat  4/23/2020 1212 by Sharla Britt RN  Outcome: Ongoing  4/23/2020 0257 by

## 2020-04-23 NOTE — PROGRESS NOTES
Pt has had several episodes of emesis. Pt given PRN Benadryl per physician to help pt rest. Fluids running. PRN antiemetics given. Will continue to monitor pt.

## 2020-04-23 NOTE — CONSULTS
Department of Obstetrics and Gynecology   Obstetrics Consult    Late entry    CHIEF COMPLAINT:  nausea and vomiting    HISTORY OF PRESENT ILLNESS:      The patient is a 25 y.o. female at 7w1d. OB History        1    Para        Term                AB        Living           SAB        TAB        Ectopic        Molar        Multiple        Live Births                Patient presents with a chief complaint as above and is being admitted for hyperemesis    Estimated Due Date: Estimated Date of Delivery: 20    PRENATAL CARE:    Complicated by: as above    PAST OB HISTORY  OB History        1    Para        Term                AB        Living           SAB        TAB        Ectopic        Molar        Multiple        Live Births                    Past Medical History:    History reviewed. No pertinent past medical history. Past Surgical History:        Procedure Laterality Date    TONSILLECTOMY       Allergies:  Patient has no known allergies.   Social History:    Social History     Socioeconomic History    Marital status: Single     Spouse name: Not on file    Number of children: Not on file    Years of education: Not on file    Highest education level: Not on file   Occupational History    Not on file   Social Needs    Financial resource strain: Not on file    Food insecurity     Worry: Not on file     Inability: Not on file    Transportation needs     Medical: Not on file     Non-medical: Not on file   Tobacco Use    Smoking status: Current Every Day Smoker     Packs/day: 0.50     Years: 1.00     Pack years: 0.50    Smokeless tobacco: Never Used   Substance and Sexual Activity    Alcohol use: No    Drug use: No    Sexual activity: Yes   Lifestyle    Physical activity     Days per week: Not on file     Minutes per session: Not on file    Stress: Not on file   Relationships    Social connections     Talks on phone: Not on file     Gets together: Not on file

## 2020-04-23 NOTE — PLAN OF CARE
injury  Description: Free from accidental physical injury  Outcome: Ongoing  Goal: Free from intentional harm  Description: Free from intentional harm  Outcome: Ongoing     Problem: Skin Integrity:  Goal: Skin integrity will stabilize  Description: Skin integrity will stabilize  Outcome: Ongoing     Problem: Daily Care:  Goal: Daily care needs are met  Description: Daily care needs are met  Outcome: Ongoing     Problem: Discharge Planning:  Goal: Patients continuum of care needs are met  Description: Patients continuum of care needs are met  Outcome: Ongoing

## 2020-04-24 VITALS
HEIGHT: 69 IN | TEMPERATURE: 98.9 F | HEART RATE: 51 BPM | RESPIRATION RATE: 16 BRPM | OXYGEN SATURATION: 96 % | DIASTOLIC BLOOD PRESSURE: 66 MMHG | BODY MASS INDEX: 24.73 KG/M2 | WEIGHT: 167 LBS | SYSTOLIC BLOOD PRESSURE: 113 MMHG

## 2020-04-24 LAB — POTASSIUM SERPL-SCNC: 3.2 MMOL/L (ref 3.5–5.1)

## 2020-04-24 PROCEDURE — 6360000002 HC RX W HCPCS: Performed by: NURSE PRACTITIONER

## 2020-04-24 PROCEDURE — 6360000002 HC RX W HCPCS: Performed by: OBSTETRICS & GYNECOLOGY

## 2020-04-24 PROCEDURE — 2580000003 HC RX 258: Performed by: FAMILY MEDICINE

## 2020-04-24 PROCEDURE — 36415 COLL VENOUS BLD VENIPUNCTURE: CPT

## 2020-04-24 PROCEDURE — 2580000003 HC RX 258: Performed by: INTERNAL MEDICINE

## 2020-04-24 PROCEDURE — 84132 ASSAY OF SERUM POTASSIUM: CPT

## 2020-04-24 RX ORDER — 0.9 % SODIUM CHLORIDE 0.9 %
1000 INTRAVENOUS SOLUTION INTRAVENOUS ONCE
Status: COMPLETED | OUTPATIENT
Start: 2020-04-24 | End: 2020-04-24

## 2020-04-24 RX ORDER — POTASSIUM CHLORIDE 7.45 MG/ML
10 INJECTION INTRAVENOUS ONCE
Status: DISCONTINUED | OUTPATIENT
Start: 2020-04-24 | End: 2020-04-24 | Stop reason: HOSPADM

## 2020-04-24 RX ORDER — POTASSIUM CHLORIDE 20 MEQ/1
40 TABLET, EXTENDED RELEASE ORAL ONCE
Status: DISCONTINUED | OUTPATIENT
Start: 2020-04-24 | End: 2020-04-24

## 2020-04-24 RX ORDER — POTASSIUM CHLORIDE 29.8 MG/ML
20 INJECTION INTRAVENOUS ONCE
Status: DISCONTINUED | OUTPATIENT
Start: 2020-04-24 | End: 2020-04-24

## 2020-04-24 RX ORDER — POTASSIUM CHLORIDE 20 MEQ/1
40 TABLET, EXTENDED RELEASE ORAL ONCE
Status: DISCONTINUED | OUTPATIENT
Start: 2020-04-24 | End: 2020-04-24 | Stop reason: HOSPADM

## 2020-04-24 RX ORDER — POTASSIUM CHLORIDE 7.45 MG/ML
10 INJECTION INTRAVENOUS PRN
Status: DISCONTINUED | OUTPATIENT
Start: 2020-04-24 | End: 2020-04-24 | Stop reason: HOSPADM

## 2020-04-24 RX ADMIN — SODIUM CHLORIDE, PRESERVATIVE FREE 10 ML: 5 INJECTION INTRAVENOUS at 04:23

## 2020-04-24 RX ADMIN — METOCLOPRAMIDE 10 MG: 5 INJECTION, SOLUTION INTRAMUSCULAR; INTRAVENOUS at 07:51

## 2020-04-24 RX ADMIN — DIPHENHYDRAMINE HYDROCHLORIDE 12.5 MG: 50 INJECTION, SOLUTION INTRAMUSCULAR; INTRAVENOUS at 04:23

## 2020-04-24 RX ADMIN — POTASSIUM CHLORIDE 10 MEQ: 7.46 INJECTION, SOLUTION INTRAVENOUS at 08:28

## 2020-04-24 RX ADMIN — ONDANSETRON HYDROCHLORIDE 4 MG: 2 SOLUTION INTRAMUSCULAR; INTRAVENOUS at 07:51

## 2020-04-24 RX ADMIN — SODIUM CHLORIDE 1000 ML: 9 INJECTION, SOLUTION INTRAVENOUS at 08:32

## 2020-04-24 RX ADMIN — METOCLOPRAMIDE 10 MG: 5 INJECTION, SOLUTION INTRAMUSCULAR; INTRAVENOUS at 01:59

## 2020-04-24 RX ADMIN — PROCHLORPERAZINE EDISYLATE 10 MG: 5 INJECTION, SOLUTION INTRAMUSCULAR; INTRAVENOUS at 04:23

## 2020-04-24 ASSESSMENT — PAIN DESCRIPTION - PROGRESSION
CLINICAL_PROGRESSION: NOT CHANGED
CLINICAL_PROGRESSION: NOT CHANGED

## 2020-04-24 ASSESSMENT — PAIN SCALES - GENERAL: PAINLEVEL_OUTOF10: 0

## 2020-04-24 NOTE — DISCHARGE SUMMARY
Full Code     Consults:   IP CONSULT TO HOSPITALIST  IP CONSULT TO OB GYN  IP CONSULT TO IV TEAM    Diet: clear liquids, advance as tolerated    Activity: activity as tolerated   Work:    Discharged Condition: fair    Prognosis: Fair    Disposition: home      Follow-up with   1. PCP within   5-7    Days    Follow up labs: NONE       Discharge Physician Signed: Elvira Lovett M.D. The patient was seen and examined on day of discharge and this discharge summary is in conjunction with any daily progress note from day of discharge.   Time spent on discharge in the examination, evaluation, counseling and review of medications and discharge plan: 34 minutes

## 2020-04-28 LAB
EKG ATRIAL RATE: 59 BPM
EKG DIAGNOSIS: NORMAL
EKG P AXIS: 66 DEGREES
EKG P-R INTERVAL: 158 MS
EKG Q-T INTERVAL: 458 MS
EKG QRS DURATION: 110 MS
EKG QTC CALCULATION (BAZETT): 453 MS
EKG R AXIS: 61 DEGREES
EKG T AXIS: 57 DEGREES
EKG VENTRICULAR RATE: 59 BPM

## 2020-05-25 ENCOUNTER — HOSPITAL ENCOUNTER (INPATIENT)
Age: 25
LOS: 1 days | Discharge: HOME | DRG: 566 | End: 2020-05-28
Attending: EMERGENCY MEDICINE | Admitting: INTERNAL MEDICINE
Payer: MEDICARE

## 2020-05-25 LAB
ALBUMIN SERPL-MCNC: 4.1 GM/DL (ref 3.4–5)
ALP BLD-CCNC: 63 IU/L (ref 40–129)
ALT SERPL-CCNC: 19 U/L (ref 10–40)
ANION GAP SERPL CALCULATED.3IONS-SCNC: 17 MMOL/L (ref 4–16)
AST SERPL-CCNC: 18 IU/L (ref 15–37)
BACTERIA: NEGATIVE /HPF
BASOPHILS ABSOLUTE: 0 K/CU MM
BASOPHILS RELATIVE PERCENT: 0.1 % (ref 0–1)
BILIRUB SERPL-MCNC: 0.5 MG/DL (ref 0–1)
BILIRUBIN URINE: NEGATIVE MG/DL
BLOOD, URINE: NEGATIVE
BUN BLDV-MCNC: 12 MG/DL (ref 6–23)
CALCIUM SERPL-MCNC: 9.3 MG/DL (ref 8.3–10.6)
CHLORIDE BLD-SCNC: 100 MMOL/L (ref 99–110)
CLARITY: CLEAR
CO2: 20 MMOL/L (ref 21–32)
COLOR: YELLOW
CREAT SERPL-MCNC: 0.7 MG/DL (ref 0.6–1.1)
DIFFERENTIAL TYPE: ABNORMAL
EOSINOPHILS ABSOLUTE: 0 K/CU MM
EOSINOPHILS RELATIVE PERCENT: 0.1 % (ref 0–3)
GFR AFRICAN AMERICAN: >60 ML/MIN/1.73M2
GFR NON-AFRICAN AMERICAN: >60 ML/MIN/1.73M2
GLUCOSE BLD-MCNC: 115 MG/DL (ref 70–99)
GLUCOSE, URINE: NEGATIVE MG/DL
HCT VFR BLD CALC: 38.5 % (ref 37–47)
HEMOGLOBIN: 13.3 GM/DL (ref 12.5–16)
IMMATURE NEUTROPHIL %: 0.4 % (ref 0–0.43)
KETONES, URINE: ABNORMAL MG/DL
LACTATE: 2.1 MMOL/L (ref 0.4–2)
LEUKOCYTE ESTERASE, URINE: NEGATIVE
LYMPHOCYTES ABSOLUTE: 1.3 K/CU MM
LYMPHOCYTES RELATIVE PERCENT: 8 % (ref 24–44)
MCH RBC QN AUTO: 32.8 PG (ref 27–31)
MCHC RBC AUTO-ENTMCNC: 34.5 % (ref 32–36)
MCV RBC AUTO: 94.8 FL (ref 78–100)
MONOCYTES ABSOLUTE: 0.5 K/CU MM
MONOCYTES RELATIVE PERCENT: 2.7 % (ref 0–4)
MUCUS: ABNORMAL HPF
NITRITE URINE, QUANTITATIVE: NEGATIVE
NUCLEATED RBC %: 0 %
PDW BLD-RTO: 13.9 % (ref 11.7–14.9)
PH, URINE: 7 (ref 5–8)
PLATELET # BLD: 241 K/CU MM (ref 140–440)
PMV BLD AUTO: 11.1 FL (ref 7.5–11.1)
POTASSIUM SERPL-SCNC: 3 MMOL/L (ref 3.5–5.1)
PROTEIN UA: NEGATIVE MG/DL
RBC # BLD: 4.06 M/CU MM (ref 4.2–5.4)
RBC URINE: 1 /HPF (ref 0–6)
SEGMENTED NEUTROPHILS ABSOLUTE COUNT: 14.6 K/CU MM
SEGMENTED NEUTROPHILS RELATIVE PERCENT: 88.7 % (ref 36–66)
SODIUM BLD-SCNC: 137 MMOL/L (ref 135–145)
SPECIFIC GRAVITY UA: 1.02 (ref 1–1.03)
SQUAMOUS EPITHELIAL: <1 /HPF
TOTAL IMMATURE NEUTOROPHIL: 0.06 K/CU MM
TOTAL NUCLEATED RBC: 0 K/CU MM
TOTAL PROTEIN: 7 GM/DL (ref 6.4–8.2)
TRICHOMONAS: ABNORMAL /HPF
UROBILINOGEN, URINE: NORMAL MG/DL (ref 0.2–1)
WBC # BLD: 16.4 K/CU MM (ref 4–10.5)
WBC UA: 3 /HPF (ref 0–5)

## 2020-05-25 PROCEDURE — 83605 ASSAY OF LACTIC ACID: CPT

## 2020-05-25 PROCEDURE — 80053 COMPREHEN METABOLIC PANEL: CPT

## 2020-05-25 PROCEDURE — 99285 EMERGENCY DEPT VISIT HI MDM: CPT

## 2020-05-25 PROCEDURE — 81001 URINALYSIS AUTO W/SCOPE: CPT

## 2020-05-25 PROCEDURE — 80307 DRUG TEST PRSMV CHEM ANLYZR: CPT

## 2020-05-25 PROCEDURE — 85025 COMPLETE CBC W/AUTO DIFF WBC: CPT

## 2020-05-25 PROCEDURE — 6360000002 HC RX W HCPCS: Performed by: EMERGENCY MEDICINE

## 2020-05-25 PROCEDURE — 96372 THER/PROPH/DIAG INJ SC/IM: CPT

## 2020-05-25 PROCEDURE — 96361 HYDRATE IV INFUSION ADD-ON: CPT

## 2020-05-25 PROCEDURE — 93005 ELECTROCARDIOGRAM TRACING: CPT | Performed by: EMERGENCY MEDICINE

## 2020-05-25 PROCEDURE — 2580000003 HC RX 258: Performed by: EMERGENCY MEDICINE

## 2020-05-25 RX ORDER — 0.9 % SODIUM CHLORIDE 0.9 %
1000 INTRAVENOUS SOLUTION INTRAVENOUS ONCE
Status: COMPLETED | OUTPATIENT
Start: 2020-05-25 | End: 2020-05-26

## 2020-05-25 RX ORDER — POTASSIUM CHLORIDE 7.45 MG/ML
10 INJECTION INTRAVENOUS PRN
Status: DISCONTINUED | OUTPATIENT
Start: 2020-05-25 | End: 2020-05-26

## 2020-05-25 RX ORDER — PROMETHAZINE HYDROCHLORIDE 25 MG/ML
25 INJECTION, SOLUTION INTRAMUSCULAR; INTRAVENOUS ONCE
Status: COMPLETED | OUTPATIENT
Start: 2020-05-25 | End: 2020-05-25

## 2020-05-25 RX ORDER — POTASSIUM CHLORIDE 20 MEQ/1
40 TABLET, EXTENDED RELEASE ORAL PRN
Status: DISCONTINUED | OUTPATIENT
Start: 2020-05-25 | End: 2020-05-26

## 2020-05-25 RX ORDER — CALCIUM CARBONATE 200(500)MG
500 TABLET,CHEWABLE ORAL ONCE
Status: COMPLETED | OUTPATIENT
Start: 2020-05-25 | End: 2020-05-26

## 2020-05-25 RX ADMIN — SODIUM CHLORIDE 1000 ML: 9 INJECTION, SOLUTION INTRAVENOUS at 23:05

## 2020-05-25 RX ADMIN — PROMETHAZINE HYDROCHLORIDE 25 MG: 25 INJECTION INTRAMUSCULAR; INTRAVENOUS at 23:11

## 2020-05-25 ASSESSMENT — PAIN DESCRIPTION - FREQUENCY: FREQUENCY: CONTINUOUS

## 2020-05-25 ASSESSMENT — PAIN DESCRIPTION - ORIENTATION: ORIENTATION: MID;LOWER

## 2020-05-25 ASSESSMENT — PAIN SCALES - GENERAL: PAINLEVEL_OUTOF10: 5

## 2020-05-25 ASSESSMENT — PAIN DESCRIPTION - PAIN TYPE: TYPE: ACUTE PAIN

## 2020-05-25 ASSESSMENT — PAIN DESCRIPTION - DESCRIPTORS: DESCRIPTORS: CRAMPING

## 2020-05-25 ASSESSMENT — PAIN DESCRIPTION - LOCATION: LOCATION: ABDOMEN

## 2020-05-26 ENCOUNTER — APPOINTMENT (OUTPATIENT)
Dept: ULTRASOUND IMAGING | Age: 25
DRG: 566 | End: 2020-05-26
Payer: MEDICARE

## 2020-05-26 PROBLEM — R11.10 VOMITING: Status: ACTIVE | Noted: 2020-05-26

## 2020-05-26 LAB
ALBUMIN SERPL-MCNC: 3.8 GM/DL (ref 3.4–5)
ALP BLD-CCNC: 57 IU/L (ref 40–128)
ALT SERPL-CCNC: 20 U/L (ref 10–40)
AMPHETAMINES: NEGATIVE
ANION GAP SERPL CALCULATED.3IONS-SCNC: 16 MMOL/L (ref 4–16)
AST SERPL-CCNC: 20 IU/L (ref 15–37)
BARBITURATE SCREEN URINE: NEGATIVE
BENZODIAZEPINE SCREEN, URINE: NEGATIVE
BILIRUB SERPL-MCNC: 0.5 MG/DL (ref 0–1)
BUN BLDV-MCNC: 13 MG/DL (ref 6–23)
CALCIUM SERPL-MCNC: 9.1 MG/DL (ref 8.3–10.6)
CANNABINOID SCREEN URINE: ABNORMAL
CHLORIDE BLD-SCNC: 102 MMOL/L (ref 99–110)
CO2: 21 MMOL/L (ref 21–32)
COCAINE METABOLITE: NEGATIVE
CREAT SERPL-MCNC: 0.8 MG/DL (ref 0.6–1.1)
GFR AFRICAN AMERICAN: >60 ML/MIN/1.73M2
GFR NON-AFRICAN AMERICAN: >60 ML/MIN/1.73M2
GLUCOSE BLD-MCNC: 114 MG/DL (ref 70–99)
HCT VFR BLD CALC: 37.1 % (ref 37–47)
HEMOGLOBIN: 12.4 GM/DL (ref 12.5–16)
LACTATE: 1 MMOL/L (ref 0.4–2)
LACTATE: 1.3 MMOL/L (ref 0.4–2)
MCH RBC QN AUTO: 32 PG (ref 27–31)
MCHC RBC AUTO-ENTMCNC: 33.4 % (ref 32–36)
MCV RBC AUTO: 95.6 FL (ref 78–100)
OPIATES, URINE: NEGATIVE
OXYCODONE: NEGATIVE
PDW BLD-RTO: 14.1 % (ref 11.7–14.9)
PHENCYCLIDINE, URINE: NEGATIVE
PLATELET # BLD: 218 K/CU MM (ref 140–440)
PMV BLD AUTO: 11.3 FL (ref 7.5–11.1)
POTASSIUM SERPL-SCNC: 3.1 MMOL/L (ref 3.5–5.1)
RBC # BLD: 3.88 M/CU MM (ref 4.2–5.4)
SODIUM BLD-SCNC: 139 MMOL/L (ref 135–145)
TOTAL PROTEIN: 6.1 GM/DL (ref 6.4–8.2)
TSH HIGH SENSITIVITY: 0.26 UIU/ML (ref 0.27–4.2)
WBC # BLD: 14.3 K/CU MM (ref 4–10.5)

## 2020-05-26 PROCEDURE — G0378 HOSPITAL OBSERVATION PER HR: HCPCS

## 2020-05-26 PROCEDURE — 96375 TX/PRO/DX INJ NEW DRUG ADDON: CPT

## 2020-05-26 PROCEDURE — 6360000002 HC RX W HCPCS: Performed by: OBSTETRICS & GYNECOLOGY

## 2020-05-26 PROCEDURE — 93010 ELECTROCARDIOGRAM REPORT: CPT | Performed by: INTERNAL MEDICINE

## 2020-05-26 PROCEDURE — 36415 COLL VENOUS BLD VENIPUNCTURE: CPT

## 2020-05-26 PROCEDURE — 6360000002 HC RX W HCPCS: Performed by: NURSE PRACTITIONER

## 2020-05-26 PROCEDURE — 94761 N-INVAS EAR/PLS OXIMETRY MLT: CPT

## 2020-05-26 PROCEDURE — 6360000002 HC RX W HCPCS: Performed by: EMERGENCY MEDICINE

## 2020-05-26 PROCEDURE — 80053 COMPREHEN METABOLIC PANEL: CPT

## 2020-05-26 PROCEDURE — 76801 OB US < 14 WKS SINGLE FETUS: CPT

## 2020-05-26 PROCEDURE — 6370000000 HC RX 637 (ALT 250 FOR IP): Performed by: EMERGENCY MEDICINE

## 2020-05-26 PROCEDURE — 96365 THER/PROPH/DIAG IV INF INIT: CPT

## 2020-05-26 PROCEDURE — 6370000000 HC RX 637 (ALT 250 FOR IP): Performed by: PHYSICIAN ASSISTANT

## 2020-05-26 PROCEDURE — 2500000003 HC RX 250 WO HCPCS: Performed by: OBSTETRICS & GYNECOLOGY

## 2020-05-26 PROCEDURE — 2580000003 HC RX 258: Performed by: INTERNAL MEDICINE

## 2020-05-26 PROCEDURE — 84443 ASSAY THYROID STIM HORMONE: CPT

## 2020-05-26 PROCEDURE — 83605 ASSAY OF LACTIC ACID: CPT

## 2020-05-26 PROCEDURE — 96361 HYDRATE IV INFUSION ADD-ON: CPT

## 2020-05-26 PROCEDURE — 96376 TX/PRO/DX INJ SAME DRUG ADON: CPT

## 2020-05-26 PROCEDURE — 85027 COMPLETE CBC AUTOMATED: CPT

## 2020-05-26 PROCEDURE — 6360000002 HC RX W HCPCS: Performed by: INTERNAL MEDICINE

## 2020-05-26 RX ORDER — ACETAMINOPHEN 325 MG/1
650 TABLET ORAL EVERY 4 HOURS PRN
Status: DISCONTINUED | OUTPATIENT
Start: 2020-05-26 | End: 2020-05-27

## 2020-05-26 RX ORDER — PROCHLORPERAZINE EDISYLATE 5 MG/ML
10 INJECTION INTRAMUSCULAR; INTRAVENOUS ONCE
Status: COMPLETED | OUTPATIENT
Start: 2020-05-26 | End: 2020-05-26

## 2020-05-26 RX ORDER — CALCIUM CARBONATE 200(500)MG
500 TABLET,CHEWABLE ORAL 3 TIMES DAILY PRN
Status: DISCONTINUED | OUTPATIENT
Start: 2020-05-26 | End: 2020-05-28 | Stop reason: HOSPADM

## 2020-05-26 RX ORDER — POTASSIUM CHLORIDE 7.45 MG/ML
10 INJECTION INTRAVENOUS PRN
Status: DISCONTINUED | OUTPATIENT
Start: 2020-05-26 | End: 2020-05-28 | Stop reason: HOSPADM

## 2020-05-26 RX ORDER — METOCLOPRAMIDE 5 MG/1
5 TABLET ORAL 3 TIMES DAILY PRN
COMMUNITY
Start: 2020-05-20 | End: 2022-02-21

## 2020-05-26 RX ORDER — SODIUM CHLORIDE 0.9 % (FLUSH) 0.9 %
10 SYRINGE (ML) INJECTION EVERY 12 HOURS SCHEDULED
Status: DISCONTINUED | OUTPATIENT
Start: 2020-05-26 | End: 2020-05-28 | Stop reason: HOSPADM

## 2020-05-26 RX ORDER — SODIUM CHLORIDE 0.9 % (FLUSH) 0.9 %
10 SYRINGE (ML) INJECTION PRN
Status: DISCONTINUED | OUTPATIENT
Start: 2020-05-26 | End: 2020-05-28 | Stop reason: HOSPADM

## 2020-05-26 RX ORDER — POTASSIUM CHLORIDE 20 MEQ/1
40 TABLET, EXTENDED RELEASE ORAL PRN
Status: DISCONTINUED | OUTPATIENT
Start: 2020-05-26 | End: 2020-05-28 | Stop reason: HOSPADM

## 2020-05-26 RX ORDER — METOCLOPRAMIDE HYDROCHLORIDE 5 MG/ML
10 INJECTION INTRAMUSCULAR; INTRAVENOUS EVERY 6 HOURS
Status: DISCONTINUED | OUTPATIENT
Start: 2020-05-26 | End: 2020-05-28 | Stop reason: HOSPADM

## 2020-05-26 RX ORDER — ONDANSETRON 4 MG/1
4 TABLET, ORALLY DISINTEGRATING ORAL EVERY 6 HOURS
COMMUNITY
Start: 2020-05-04 | End: 2022-06-08

## 2020-05-26 RX ORDER — PROMETHAZINE HYDROCHLORIDE 25 MG/1
25 TABLET ORAL EVERY 6 HOURS PRN
COMMUNITY
Start: 2020-04-24 | End: 2020-05-26

## 2020-05-26 RX ORDER — ONDANSETRON 2 MG/ML
4 INJECTION INTRAMUSCULAR; INTRAVENOUS EVERY 6 HOURS PRN
Status: DISCONTINUED | OUTPATIENT
Start: 2020-05-26 | End: 2020-05-28 | Stop reason: HOSPADM

## 2020-05-26 RX ORDER — SODIUM CHLORIDE 9 MG/ML
INJECTION, SOLUTION INTRAVENOUS CONTINUOUS
Status: DISCONTINUED | OUTPATIENT
Start: 2020-05-26 | End: 2020-05-27

## 2020-05-26 RX ADMIN — ONDANSETRON 4 MG: 2 INJECTION INTRAMUSCULAR; INTRAVENOUS at 09:29

## 2020-05-26 RX ADMIN — SODIUM CHLORIDE, PRESERVATIVE FREE 10 ML: 5 INJECTION INTRAVENOUS at 09:29

## 2020-05-26 RX ADMIN — CALCIUM CARBONATE 500 MG: 500 TABLET, CHEWABLE ORAL at 00:46

## 2020-05-26 RX ADMIN — METOCLOPRAMIDE 10 MG: 5 INJECTION, SOLUTION INTRAMUSCULAR; INTRAVENOUS at 18:10

## 2020-05-26 RX ADMIN — POTASSIUM CHLORIDE 10 MEQ: 7.46 INJECTION, SOLUTION INTRAVENOUS at 02:03

## 2020-05-26 RX ADMIN — ENOXAPARIN SODIUM 40 MG: 40 INJECTION SUBCUTANEOUS at 09:29

## 2020-05-26 RX ADMIN — SODIUM CHLORIDE, PRESERVATIVE FREE 10 ML: 5 INJECTION INTRAVENOUS at 20:31

## 2020-05-26 RX ADMIN — ONDANSETRON 4 MG: 2 INJECTION INTRAMUSCULAR; INTRAVENOUS at 04:04

## 2020-05-26 RX ADMIN — CALCIUM CARBONATE 500 MG: 500 TABLET, CHEWABLE ORAL at 23:33

## 2020-05-26 RX ADMIN — PROCHLORPERAZINE EDISYLATE 10 MG: 5 INJECTION INTRAMUSCULAR; INTRAVENOUS at 06:14

## 2020-05-26 RX ADMIN — POTASSIUM CHLORIDE 10 MEQ: 7.46 INJECTION, SOLUTION INTRAVENOUS at 00:54

## 2020-05-26 RX ADMIN — FAMOTIDINE 20 MG: 10 INJECTION, SOLUTION INTRAVENOUS at 11:12

## 2020-05-26 RX ADMIN — ONDANSETRON 4 MG: 2 INJECTION INTRAMUSCULAR; INTRAVENOUS at 20:33

## 2020-05-26 RX ADMIN — METOCLOPRAMIDE 10 MG: 5 INJECTION, SOLUTION INTRAMUSCULAR; INTRAVENOUS at 22:56

## 2020-05-26 RX ADMIN — HYDROCORTISONE SODIUM SUCCINATE 100 MG: 100 INJECTION, POWDER, FOR SOLUTION INTRAMUSCULAR; INTRAVENOUS at 20:31

## 2020-05-26 RX ADMIN — POTASSIUM CHLORIDE 40 MEQ: 1500 TABLET, EXTENDED RELEASE ORAL at 20:31

## 2020-05-26 RX ADMIN — METOCLOPRAMIDE 10 MG: 5 INJECTION, SOLUTION INTRAMUSCULAR; INTRAVENOUS at 11:12

## 2020-05-26 RX ADMIN — HYDROCORTISONE SODIUM SUCCINATE 100 MG: 100 INJECTION, POWDER, FOR SOLUTION INTRAMUSCULAR; INTRAVENOUS at 11:12

## 2020-05-26 ASSESSMENT — PAIN SCALES - GENERAL: PAINLEVEL_OUTOF10: 0

## 2020-05-26 ASSESSMENT — PAIN DESCRIPTION - PAIN TYPE: TYPE: ACUTE PAIN

## 2020-05-26 NOTE — ED PROVIDER NOTES
Triage Chief Complaint:   Emesis and Nausea    [place]:  Jin Marshall is a 22 y.o. female that presents with nausea, vomiting, diarrhea and syncope. Patient is a G3, P2 at 13 weeks with hyperemesis gravidarum. She states that she has had intractable vomiting, nonbilious nonbloody over the last 2 days despite the use of Phenergan, Reglan, Zofran at home. She was seen at an outside hospital in the emergency department yesterday. She states that she also has diarrhea every time that she vomits. She states that she has passed out twice from being lightheaded. She states that she gets lightheaded with standing at this point. She cannot tolerate any food or fluids. States that she has abdominal and chest pain with vomiting. This started after her repeated episodes of vomiting. She does not have any urinary complaints or vaginal bleeding. ROS:  At least 14 systems reviewed and otherwise acutely negative except as in the [address] History reviewed. No pertinent past medical history. Past Surgical History:   Procedure Laterality Date    TONSILLECTOMY       History reviewed. No pertinent family history.   Social History     Socioeconomic History    Marital status: Single     Spouse name: Not on file    Number of children: Not on file    Years of education: Not on file    Highest education level: Not on file   Occupational History    Not on file   Social Needs    Financial resource strain: Not on file    Food insecurity     Worry: Not on file     Inability: Not on file    Transportation needs     Medical: Not on file     Non-medical: Not on file   Tobacco Use    Smoking status: Current Every Day Smoker     Packs/day: 0.50     Years: 1.00     Pack years: 0.50    Smokeless tobacco: Never Used   Substance and Sexual Activity    Alcohol use: No    Drug use: No    Sexual activity: Yes   Lifestyle    Physical activity     Days per week: Not on file     Minutes per session: Not on file    Stress: Not on file   Relationships    Social connections     Talks on phone: Not on file     Gets together: Not on file     Attends Orthodox service: Not on file     Active member of club or organization: Not on file     Attends meetings of clubs or organizations: Not on file     Relationship status: Not on file    Intimate partner violence     Fear of current or ex partner: Not on file     Emotionally abused: Not on file     Physically abused: Not on file     Forced sexual activity: Not on file   Other Topics Concern    Not on file   Social History Narrative    Not on file     Current Facility-Administered Medications   Medication Dose Route Frequency Provider Last Rate Last Dose    0.9 % sodium chloride bolus  1,000 mL Intravenous Once Gopal Burgos MD 1,000 mL/hr at 05/25/20 2305 1,000 mL at 05/25/20 2305    calcium carbonate (TUMS) chewable tablet 500 mg  500 mg Oral Once Gopal Burgos MD        potassium chloride (KLOR-CON M) extended release tablet 40 mEq  40 mEq Oral PRN Gopal Burgos MD        Or    potassium bicarb-citric acid (EFFER-K) effervescent tablet 40 mEq  40 mEq Oral PRN Gopal Burgos MD        Or    potassium chloride 10 mEq/100 mL IVPB (Peripheral Line)  10 mEq Intravenous PRN Gopal Burgos MD         No current outpatient medications on file. No Known Allergies    Nursing Notes Reviewed    Physical Exam:  ED Triage Vitals [05/25/20 2228]   Enc Vitals Group      BP (!) 146/85      Pulse 75      Resp 16      Temp 98.2 °F (36.8 °C)      Temp Source Oral      SpO2 97 %      Weight       Height       Head Circumference       Peak Flow       Pain Score       Pain Loc       Pain Edu? Excl. in [address]? GENERAL APPEARANCE: Awake and alert. Cooperative. No acute distress. Appears fatigued. HEAD: Normocephalic. Atraumatic. EYES: EOM's grossly intact. Sclera anicteric. ENT: Mucous membranes are moist. Tolerates saliva. No trismus. NECK: Supple. Trachea midline. HEART: RRR. Radial pulses 2+.   LUNGS: vital signs. Presents with intractable vomiting during pregnancy. She has benign abdominal exam and I do not suspect acute intra-abdominal emergency at this time. The patient is given IV fluids and given dose of Phenergan without improvement in symptoms and continues to vomit here. She does have hypokalemia with potassium of 3.0. Otherwise, no significant metabolic abnormalities. She will be admitted at this time for hydration, electrolyte repletion and further symptomatic management. She is agreeable with this plan of care. Clinical Impression:  1.  Hyperemesis gravidarum with metabolic disturbance, antepartum      (Please note that portions of this note may have been completed with a voice recognition program. Efforts were made to edit the dictations but occasionally words are mis-transcribed.)    MD Josette Gay MD  05/25/20 0253

## 2020-05-26 NOTE — ED NOTES
While at lunch [facility] stopped the potassium , this nurse restarted it.      Eduar Crespo RN  05/26/20 1163

## 2020-05-26 NOTE — ED NOTES
Critical result received from lab with potassium of 3.0 Dr. Harvinder Porter notified.       Cassie Holliday RN  05/25/20 0491

## 2020-05-26 NOTE — H&P
History and Physical      Name:  Toby Miller /Age/Sex: 1995  (22 y.o. female)   MRN & CSN:  [id number] & [id number] Admission Date/Time: 2020 10:14 PM   Location:  ED16/ED-16 PCP: No primary care provider on file. Hospital Day: 2    Assessment and Plan:   Toby Miller is a 22 y.o.  female  who presents with:    Hyperemesis gravidarum  -Patient follows with Rui Felix in [place]  -Patient reports she passed out twice today when vomiting  -Potassium level is 3.0 and bicarb level is 20  -We will admit her  -Use Zofran as needed  -She states that she is also on Phenergan and Reglan from her OB/GYN  -We will consult OB/GYN to see her-I have communicated via text with Dr. Shawanda Jenkins who replied she will see her today, appreciate consult  -Patient requested water multiple times when I saw her, and when she was given a small amount and drank it she immediately threw it up. She needs to be admitted. Second trimester pregnancy (13 weeks)  -OB consult     History of preeclampsia in a prior pregnancy     History of oligohydramnios in a prior pregnancy     Tobacco use-she reports she quit a few days ago     Marijuana use-she reports that she used pot a few days ago -we will check urine drug screen     DVT prophylaxis-Lovenox      History of Present Illness:     Chief Complaint:   Toby Miller is a 22 y.o.  female  who presents with vomiting     PCP: none    Patient presents to the hospital with vomiting. She reports that it started 2 days ago. Stated that she still throws up every time she drinks. She has had similar symptoms before, so this is really nothing new that she has not experienced before. She is 13 weeks pregnant. She states that her OB/GYN physician in [place] gave her Phenergan, Reglan, and Zofran, and that she filled them at [facility] in [facility] where she lives.     She reports that she became dizzy when she was vomiting today, and that she passed out

## 2020-05-26 NOTE — PLAN OF CARE
Problem: Safety:  Goal: Free from accidental physical injury  Description: Free from accidental physical injury  Outcome: Ongoing     Problem: Daily Care:  Goal: Daily care needs are met  Description: Daily care needs are met  Outcome: Ongoing     Problem: Pain:  Goal: Patient's pain/discomfort is manageable  Description: Patient's pain/discomfort is manageable  Outcome: Ongoing     Problem: Skin Integrity:  Goal: Skin integrity will stabilize  Description: Skin integrity will stabilize  Outcome: Ongoing     Problem: Discharge Planning:  Goal: Patients continuum of care needs are met  Description: Patients continuum of care needs are met  Outcome: Ongoing

## 2020-05-26 NOTE — PROGRESS NOTES
Patient is admitted early this morning with a diagnosis of hyperemesis gravidarum  13 weeks gestation  Returned from pelvic ultrasound-appears to be sleepy and very reluctant to respond verbally    She has nausea currently but no vomiting    Per OB/GYN recommendation we will continue with n.p.o., IV steroids, Reglan, Zofran and Phenergan as needed    Appreciate OB/GYN's input  We will continue to monitor closely  Replace potassium as needed per protocol

## 2020-05-27 PROBLEM — O21.0 HYPEREMESIS GRAVIDARUM: Status: ACTIVE | Noted: 2020-05-27

## 2020-05-27 LAB
ALBUMIN SERPL-MCNC: 3.9 GM/DL (ref 3.4–5)
ALP BLD-CCNC: 55 IU/L (ref 40–129)
ALT SERPL-CCNC: 22 U/L (ref 10–40)
ANION GAP SERPL CALCULATED.3IONS-SCNC: 16 MMOL/L (ref 4–16)
AST SERPL-CCNC: 16 IU/L (ref 15–37)
BACTERIA: ABNORMAL /HPF
BILIRUB SERPL-MCNC: 0.7 MG/DL (ref 0–1)
BILIRUBIN URINE: NEGATIVE MG/DL
BLOOD, URINE: NEGATIVE
BUN BLDV-MCNC: 7 MG/DL (ref 6–23)
CALCIUM SERPL-MCNC: 8 MG/DL (ref 8.3–10.6)
CHLORIDE BLD-SCNC: 102 MMOL/L (ref 99–110)
CLARITY: ABNORMAL
CO2: 19 MMOL/L (ref 21–32)
COLOR: YELLOW
CREAT SERPL-MCNC: 0.7 MG/DL (ref 0.6–1.1)
GFR AFRICAN AMERICAN: >60 ML/MIN/1.73M2
GFR NON-AFRICAN AMERICAN: >60 ML/MIN/1.73M2
GLUCOSE BLD-MCNC: 109 MG/DL (ref 70–99)
GLUCOSE, URINE: NEGATIVE MG/DL
HCT VFR BLD CALC: 34.9 % (ref 37–47)
HEMOGLOBIN: 12 GM/DL (ref 12.5–16)
KETONES, URINE: ABNORMAL MG/DL
LEUKOCYTE ESTERASE, URINE: ABNORMAL
MCH RBC QN AUTO: 32.4 PG (ref 27–31)
MCHC RBC AUTO-ENTMCNC: 34.4 % (ref 32–36)
MCV RBC AUTO: 94.3 FL (ref 78–100)
MUCUS: ABNORMAL HPF
NITRITE URINE, QUANTITATIVE: NEGATIVE
PDW BLD-RTO: 13.5 % (ref 11.7–14.9)
PH, URINE: 7 (ref 5–8)
PLATELET # BLD: 200 K/CU MM (ref 140–440)
PMV BLD AUTO: 11 FL (ref 7.5–11.1)
POTASSIUM SERPL-SCNC: 3.3 MMOL/L (ref 3.5–5.1)
PROTEIN UA: NEGATIVE MG/DL
RBC # BLD: 3.7 M/CU MM (ref 4.2–5.4)
RBC URINE: ABNORMAL /HPF (ref 0–6)
SODIUM BLD-SCNC: 137 MMOL/L (ref 135–145)
SPECIFIC GRAVITY UA: 1.01 (ref 1–1.03)
SQUAMOUS EPITHELIAL: 10 /HPF
TOTAL PROTEIN: 6 GM/DL (ref 6.4–8.2)
TRANSITIONAL EPITHELIAL: <1 /HPF
TRICHOMONAS: ABNORMAL /HPF
UROBILINOGEN, URINE: 1 MG/DL (ref 0.2–1)
WBC # BLD: 9.7 K/CU MM (ref 4–10.5)
WBC UA: 34 /HPF (ref 0–5)

## 2020-05-27 PROCEDURE — 6360000002 HC RX W HCPCS: Performed by: OBSTETRICS & GYNECOLOGY

## 2020-05-27 PROCEDURE — 80053 COMPREHEN METABOLIC PANEL: CPT

## 2020-05-27 PROCEDURE — 6360000002 HC RX W HCPCS: Performed by: INTERNAL MEDICINE

## 2020-05-27 PROCEDURE — 6370000000 HC RX 637 (ALT 250 FOR IP): Performed by: PHYSICIAN ASSISTANT

## 2020-05-27 PROCEDURE — 2500000003 HC RX 250 WO HCPCS: Performed by: OBSTETRICS & GYNECOLOGY

## 2020-05-27 PROCEDURE — 81001 URINALYSIS AUTO W/SCOPE: CPT

## 2020-05-27 PROCEDURE — 1200000000 HC SEMI PRIVATE

## 2020-05-27 PROCEDURE — 96372 THER/PROPH/DIAG INJ SC/IM: CPT

## 2020-05-27 PROCEDURE — 2580000003 HC RX 258: Performed by: INTERNAL MEDICINE

## 2020-05-27 PROCEDURE — 85027 COMPLETE CBC AUTOMATED: CPT

## 2020-05-27 PROCEDURE — G0378 HOSPITAL OBSERVATION PER HR: HCPCS

## 2020-05-27 PROCEDURE — 6360000002 HC RX W HCPCS: Performed by: FAMILY MEDICINE

## 2020-05-27 PROCEDURE — 36415 COLL VENOUS BLD VENIPUNCTURE: CPT

## 2020-05-27 PROCEDURE — 96376 TX/PRO/DX INJ SAME DRUG ADON: CPT

## 2020-05-27 RX ORDER — POTASSIUM CHLORIDE AND SODIUM CHLORIDE 900; 300 MG/100ML; MG/100ML
INJECTION, SOLUTION INTRAVENOUS CONTINUOUS
Status: DISCONTINUED | OUTPATIENT
Start: 2020-05-27 | End: 2020-05-28

## 2020-05-27 RX ADMIN — ONDANSETRON 4 MG: 2 INJECTION INTRAMUSCULAR; INTRAVENOUS at 02:59

## 2020-05-27 RX ADMIN — SODIUM CHLORIDE: 9 INJECTION, SOLUTION INTRAVENOUS at 05:10

## 2020-05-27 RX ADMIN — ONDANSETRON 4 MG: 2 INJECTION INTRAMUSCULAR; INTRAVENOUS at 09:38

## 2020-05-27 RX ADMIN — POTASSIUM CHLORIDE AND SODIUM CHLORIDE: 900; 300 INJECTION, SOLUTION INTRAVENOUS at 12:00

## 2020-05-27 RX ADMIN — ONDANSETRON 4 MG: 2 INJECTION INTRAMUSCULAR; INTRAVENOUS at 21:17

## 2020-05-27 RX ADMIN — SODIUM CHLORIDE, PRESERVATIVE FREE 10 ML: 5 INJECTION INTRAVENOUS at 09:37

## 2020-05-27 RX ADMIN — HYDROCORTISONE SODIUM SUCCINATE 100 MG: 100 INJECTION, POWDER, FOR SOLUTION INTRAMUSCULAR; INTRAVENOUS at 21:17

## 2020-05-27 RX ADMIN — METOCLOPRAMIDE 10 MG: 5 INJECTION, SOLUTION INTRAMUSCULAR; INTRAVENOUS at 19:03

## 2020-05-27 RX ADMIN — CALCIUM CARBONATE 500 MG: 500 TABLET, CHEWABLE ORAL at 07:33

## 2020-05-27 RX ADMIN — HYDROCORTISONE SODIUM SUCCINATE 100 MG: 100 INJECTION, POWDER, FOR SOLUTION INTRAMUSCULAR; INTRAVENOUS at 09:38

## 2020-05-27 RX ADMIN — METOCLOPRAMIDE 10 MG: 5 INJECTION, SOLUTION INTRAMUSCULAR; INTRAVENOUS at 05:10

## 2020-05-27 RX ADMIN — CALCIUM CARBONATE 500 MG: 500 TABLET, CHEWABLE ORAL at 19:35

## 2020-05-27 RX ADMIN — ENOXAPARIN SODIUM 40 MG: 40 INJECTION SUBCUTANEOUS at 09:37

## 2020-05-27 RX ADMIN — METOCLOPRAMIDE 10 MG: 5 INJECTION, SOLUTION INTRAMUSCULAR; INTRAVENOUS at 22:42

## 2020-05-27 RX ADMIN — METOCLOPRAMIDE 10 MG: 5 INJECTION, SOLUTION INTRAMUSCULAR; INTRAVENOUS at 12:00

## 2020-05-27 RX ADMIN — FAMOTIDINE 20 MG: 10 INJECTION, SOLUTION INTRAVENOUS at 09:38

## 2020-05-27 ASSESSMENT — PAIN DESCRIPTION - PAIN TYPE: TYPE: ACUTE PAIN

## 2020-05-27 ASSESSMENT — PAIN DESCRIPTION - LOCATION: LOCATION: ABDOMEN

## 2020-05-27 ASSESSMENT — PAIN SCALES - GENERAL
PAINLEVEL_OUTOF10: 2
PAINLEVEL_OUTOF10: 0

## 2020-05-27 ASSESSMENT — PAIN DESCRIPTION - FREQUENCY: FREQUENCY: INTERMITTENT

## 2020-05-27 ASSESSMENT — PAIN DESCRIPTION - DESCRIPTORS: DESCRIPTORS: ACHING

## 2020-05-27 ASSESSMENT — PAIN DESCRIPTION - ORIENTATION: ORIENTATION: LOWER;RIGHT

## 2020-05-27 NOTE — PLAN OF CARE
Problem: Safety:  Goal: Free from accidental physical injury  Description: Free from accidental physical injury  5/27/2020 1220 by Curlee Schilder, RN  Outcome: Ongoing  5/26/2020 2328 by Zoë Villatoro RN  Outcome: Ongoing     Problem: Daily Care:  Goal: Daily care needs are met  Description: Daily care needs are met  5/27/2020 1220 by Curlee Schilder, RN  Outcome: Ongoing  5/26/2020 2328 by Zoë Villatoro RN  Outcome: Ongoing     Problem: Pain:  Goal: Patient's pain/discomfort is manageable  Description: Patient's pain/discomfort is manageable  5/27/2020 1220 by Curlee Schilder, RN  Outcome: Ongoing  5/26/2020 2328 by Zoë Villatoro RN  Outcome: Ongoing     Problem: Skin Integrity:  Goal: Skin integrity will stabilize  Description: Skin integrity will stabilize  5/27/2020 1220 by Curlee Schilder, RN  Outcome: Ongoing  5/26/2020 2328 by Zoë Villatoro RN  Outcome: Ongoing     Problem: Discharge Planning:  Goal: Patients continuum of care needs are met  Description: Patients continuum of care needs are met  5/27/2020 1220 by Curlee Schilder, RN  Outcome: Ongoing  5/26/2020 2328 by Zoë Villatoro RN  Outcome: Ongoing     Problem: Nausea/Vomiting:  Goal: Absence of nausea/vomiting  Description: Absence of nausea/vomiting  5/27/2020 1220 by Curlee Schilder, RN  Outcome: Ongoing  5/26/2020 2328 by Zoë Villatoro RN  Outcome: Ongoing  Goal: Able to drink  Description: Able to drink  5/27/2020 1220 by Curlee Schilder, RN  Outcome: Ongoing  5/26/2020 2328 by Zoë Villatoro RN  Outcome: Ongoing  Goal: Able to eat  Description: Able to eat  5/27/2020 1220 by Curlee Schilder, RN  Outcome: Ongoing  5/26/2020 2328 by Zoë Villatoro RN  Outcome: Ongoing  Goal: Ability to achieve adequate nutritional intake will improve  Description: Ability to achieve adequate nutritional intake will improve  5/27/2020 1220 by Curlee Schilder, RN  Outcome: Ongoing  5/26/2020 2328 by Sarah Hill

## 2020-05-27 NOTE — PROGRESS NOTES
Hospitalist Progress Note      Name:  Cassius Underwood /Age/Sex: 1995  (22 y.o. female)   MRN & CSN:  [id number] & [id number] Admission Date/Time: 2020 10:14 PM   Location:  [address] [place] [address] PCP: No primary care provider on file. Cassius Underwood is a 22 y.o.  female  who presents with Emesis and Nausea      Assessment and Plan:   Hyperemesis gravidarum, 13.4 GA  - IVF  - antiemetics prn  - reglan  - steroids trial per ob/gyn  - advance diet as tolerated  - ob/gyn consulted    Hypokalemia, Metabolic Acidosis  - IVF NS add K in fluids  - monitor labs      History of preeclampsia in a prior pregnancy  History of oligohydramnios in a prior pregnancy  Tobacco use-she reports she quit a few days ago  Marijuana use-she reports that she used pot a few days ago -we will check urine drug screen     DT prophylaxis-Lovenox        Diet DIET CLEAR LIQUID;   Code Status Full Code     Medications:   Medications:    sodium chloride flush  10 mL Intravenous 2 times per day    enoxaparin  40 mg Subcutaneous Daily    metoclopramide  10 mg Intravenous Q6H    hydrocortisone sodium succinate PF  100 mg Intravenous BID    famotidine (PEPCID) injection  20 mg Intravenous Daily      Infusions:    0.9% NaCl with KCl 40 mEq       PRN Meds: sodium chloride flush, 10 mL, PRN  acetaminophen, 650 mg, Q4H PRN  ondansetron, 4 mg, Q6H PRN  potassium chloride, 40 mEq, PRN    Or  potassium alternative oral replacement, 40 mEq, PRN    Or  potassium chloride, 10 mEq, PRN  calcium carbonate, 500 mg, TID PRN      Subjective:   Wants to eat, states she feels better    Objective:        Intake/Output Summary (Last 24 hours) at 2020 1131  Last data filed at 2020 0511  Gross per 24 hour   Intake 2460 ml   Output --   Net 2460 ml      Vitals:   Vitals:    20 1012   BP: 134/82   Pulse: 63   Resp: 18   Temp: 97.1 °F (36.2 °C)   SpO2: 97%     Physical Exam:   Gen:  awake, alert, no apparent distress  Head/Eyes:  Normocephalic

## 2020-05-28 VITALS
SYSTOLIC BLOOD PRESSURE: 130 MMHG | RESPIRATION RATE: 16 BRPM | HEART RATE: 72 BPM | DIASTOLIC BLOOD PRESSURE: 71 MMHG | TEMPERATURE: 97.9 F | OXYGEN SATURATION: 97 % | WEIGHT: 182 LBS | HEIGHT: 67 IN | BODY MASS INDEX: 28.56 KG/M2

## 2020-05-28 LAB
ALBUMIN SERPL-MCNC: 3.3 GM/DL (ref 3.4–5)
ALP BLD-CCNC: 47 IU/L (ref 40–128)
ALT SERPL-CCNC: 22 U/L (ref 10–40)
ANION GAP SERPL CALCULATED.3IONS-SCNC: 12 MMOL/L (ref 4–16)
AST SERPL-CCNC: 13 IU/L (ref 15–37)
BILIRUB SERPL-MCNC: 0.4 MG/DL (ref 0–1)
BUN BLDV-MCNC: 6 MG/DL (ref 6–23)
CALCIUM SERPL-MCNC: 8.4 MG/DL (ref 8.3–10.6)
CHLORIDE BLD-SCNC: 104 MMOL/L (ref 99–110)
CO2: 22 MMOL/L (ref 21–32)
CREAT SERPL-MCNC: 0.6 MG/DL (ref 0.6–1.1)
GFR AFRICAN AMERICAN: >60 ML/MIN/1.73M2
GFR NON-AFRICAN AMERICAN: >60 ML/MIN/1.73M2
GLUCOSE BLD-MCNC: 125 MG/DL (ref 70–99)
POTASSIUM SERPL-SCNC: 3.4 MMOL/L (ref 3.5–5.1)
SODIUM BLD-SCNC: 138 MMOL/L (ref 135–145)
TOTAL PROTEIN: 5.3 GM/DL (ref 6.4–8.2)

## 2020-05-28 PROCEDURE — 6360000002 HC RX W HCPCS: Performed by: FAMILY MEDICINE

## 2020-05-28 PROCEDURE — 6370000000 HC RX 637 (ALT 250 FOR IP): Performed by: PHYSICIAN ASSISTANT

## 2020-05-28 PROCEDURE — 80053 COMPREHEN METABOLIC PANEL: CPT

## 2020-05-28 PROCEDURE — 36415 COLL VENOUS BLD VENIPUNCTURE: CPT

## 2020-05-28 PROCEDURE — 6360000002 HC RX W HCPCS: Performed by: OBSTETRICS & GYNECOLOGY

## 2020-05-28 RX ORDER — PREDNISONE 10 MG/1
TABLET ORAL
Qty: 10 TABLET | Refills: 0 | Status: SHIPPED | OUTPATIENT
Start: 2020-05-28 | End: 2022-02-21

## 2020-05-28 RX ORDER — ACETAMINOPHEN 325 MG/1
650 TABLET ORAL EVERY 4 HOURS PRN
Status: DISCONTINUED | OUTPATIENT
Start: 2020-05-28 | End: 2020-05-28 | Stop reason: HOSPADM

## 2020-05-28 RX ORDER — CEFDINIR 300 MG/1
300 CAPSULE ORAL 2 TIMES DAILY
Qty: 14 CAPSULE | Refills: 0 | Status: SHIPPED | OUTPATIENT
Start: 2020-05-28 | End: 2020-06-04

## 2020-05-28 RX ORDER — POTASSIUM CHLORIDE 20 MEQ/1
40 TABLET, EXTENDED RELEASE ORAL ONCE
Status: DISCONTINUED | OUTPATIENT
Start: 2020-05-28 | End: 2020-05-28 | Stop reason: HOSPADM

## 2020-05-28 RX ADMIN — CALCIUM CARBONATE 500 MG: 500 TABLET, CHEWABLE ORAL at 01:09

## 2020-05-28 RX ADMIN — POTASSIUM CHLORIDE AND SODIUM CHLORIDE: 900; 300 INJECTION, SOLUTION INTRAVENOUS at 02:39

## 2020-05-28 RX ADMIN — METOCLOPRAMIDE 10 MG: 5 INJECTION, SOLUTION INTRAMUSCULAR; INTRAVENOUS at 04:32

## 2020-05-28 RX ADMIN — ACETAMINOPHEN 650 MG: 325 TABLET ORAL at 05:24

## 2020-05-28 ASSESSMENT — PAIN DESCRIPTION - ORIENTATION
ORIENTATION: LOWER;RIGHT
ORIENTATION: RIGHT;LOWER

## 2020-05-28 ASSESSMENT — PAIN SCALES - GENERAL
PAINLEVEL_OUTOF10: 0
PAINLEVEL_OUTOF10: 0
PAINLEVEL_OUTOF10: 4
PAINLEVEL_OUTOF10: 2
PAINLEVEL_OUTOF10: 7

## 2020-05-28 ASSESSMENT — PAIN DESCRIPTION - DESCRIPTORS
DESCRIPTORS: ACHING
DESCRIPTORS: ACHING

## 2020-05-28 ASSESSMENT — PAIN DESCRIPTION - LOCATION
LOCATION: ABDOMEN;FLANK
LOCATION: ABDOMEN

## 2020-05-28 ASSESSMENT — PAIN DESCRIPTION - FREQUENCY
FREQUENCY: INTERMITTENT
FREQUENCY: INTERMITTENT

## 2020-05-28 ASSESSMENT — PAIN DESCRIPTION - PAIN TYPE
TYPE: ACUTE PAIN
TYPE: ACUTE PAIN

## 2020-05-28 NOTE — DISCHARGE SUMMARY
Where to Get Your Medications      These medications were sent to [address] [place] [id number]    Phone:  [phone]   · cefdinir 300 MG capsule  · predniSONE 10 MG tablet            Code Status: Full Code     Consults:   IP CONSULT TO HOSPITALIST  IP CONSULT TO OB GYN    Diet: regular diet    Activity: activity as tolerated   Work:    Discharged Condition: good    Prognosis: Fair - Good    Disposition: home      Follow-up with   1. PCP within   5-7    Days    Follow up labs: none       Discharge Physician Signed: Nicolle Joseph M.D. The patient was seen and examined on day of discharge and this discharge summary is in conjunction with any daily progress note from day of discharge.   Time spent on discharge in the examination, evaluation, counseling and review of medications and discharge plan: 34 minutes

## 2020-05-28 NOTE — PLAN OF CARE
Problem: Safety:  Goal: Free from accidental physical injury  Description: Free from accidental physical injury  5/27/2020 2034 by Saniya Bray RN  Outcome: Ongoing  5/27/2020 1220 by Claudia Nunes RN  Outcome: Ongoing     Problem: Daily Care:  Goal: Daily care needs are met  Description: Daily care needs are met  5/27/2020 2034 by Saniya Bray RN  Outcome: Ongoing  5/27/2020 1220 by Claudia Nunes RN  Outcome: Ongoing     Problem: Pain:  Goal: Patient's pain/discomfort is manageable  Description: Patient's pain/discomfort is manageable  5/27/2020 2034 by Saniya Bray RN  Outcome: Ongoing  5/27/2020 1220 by Claudia Nunes RN  Outcome: Ongoing  Goal: Pain level will decrease  Description: Pain level will decrease  Outcome: Ongoing  Goal: Control of acute pain  Description: Control of acute pain  Outcome: Ongoing  Goal: Control of chronic pain  Description: Control of chronic pain  Outcome: Ongoing     Problem: Skin Integrity:  Goal: Skin integrity will stabilize  Description: Skin integrity will stabilize  5/27/2020 2034 by Saniya Bray RN  Outcome: Ongoing  5/27/2020 1220 by Claudia Nunes RN  Outcome: Ongoing     Problem: Discharge Planning:  Goal: Patients continuum of care needs are met  Description: Patients continuum of care needs are met  5/27/2020 2034 by Saniya Bray RN  Outcome: Ongoing  5/27/2020 1220 by Claudia Nunes RN  Outcome: Ongoing     Problem: Nausea/Vomiting:  Goal: Absence of nausea/vomiting  Description: Absence of nausea/vomiting  5/27/2020 2034 by Saniya Bray RN  Outcome: Ongoing  5/27/2020 1220 by Claudia Nunes RN  Outcome: Ongoing  Goal: Able to drink  Description: Able to drink  5/27/2020 2034 by Saniya Bray RN  Outcome: Ongoing  5/27/2020 1220 by Claudia Nunes RN  Outcome: Ongoing  Goal: Able to eat  Description: Able to eat  5/27/2020 2034 by Saniya Bray RN  Outcome: Ongoing  5/27/2020 1220 by Claudia Nunes RN  Outcome: Ongoing  Goal: Ability to achieve adequate nutritional intake will improve  Description: Ability to achieve adequate nutritional intake will improve  5/27/2020 2034 by Liban Perez RN  Outcome: Ongoing  5/27/2020 1220 by Elizabeth York RN  Outcome: Ongoing     Problem: Falls - Risk of:  Goal: Will remain free from falls  Description: Will remain free from falls  5/27/2020 2034 by Liban Perez RN  Outcome: Ongoing  5/27/2020 1220 by Elizabeth York RN  Outcome: Ongoing  Goal: Absence of physical injury  Description: Absence of physical injury  5/27/2020 2034 by Liban Perez RN  Outcome: Ongoing  5/27/2020 1220 by Elizabeth York RN  Outcome: Ongoing

## 2020-05-29 LAB
EKG ATRIAL RATE: 66 BPM
EKG DIAGNOSIS: NORMAL
EKG P AXIS: 64 DEGREES
EKG P-R INTERVAL: 138 MS
EKG Q-T INTERVAL: 446 MS
EKG QRS DURATION: 106 MS
EKG QTC CALCULATION (BAZETT): 467 MS
EKG R AXIS: 61 DEGREES
EKG T AXIS: 50 DEGREES
EKG VENTRICULAR RATE: 66 BPM

## 2022-02-08 ENCOUNTER — HOSPITAL ENCOUNTER (EMERGENCY)
Age: 27
Discharge: HOME | End: 2022-02-08
Payer: MEDICARE

## 2022-02-08 VITALS
RESPIRATION RATE: 16 BRPM | OXYGEN SATURATION: 99 % | TEMPERATURE: 97.8 F | DIASTOLIC BLOOD PRESSURE: 67 MMHG | HEART RATE: 87 BPM | SYSTOLIC BLOOD PRESSURE: 124 MMHG

## 2022-02-08 DIAGNOSIS — R11.2 NON-INTRACTABLE VOMITING WITH NAUSEA, UNSPECIFIED VOMITING TYPE: ICD-10-CM

## 2022-02-08 DIAGNOSIS — U07.1 COVID-19: Primary | ICD-10-CM

## 2022-02-08 LAB
ANION GAP SERPL CALCULATED.3IONS-SCNC: 14 MMOL/L (ref 4–16)
BASOPHILS ABSOLUTE: 0 K/CU MM
BASOPHILS RELATIVE PERCENT: 0.1 % (ref 0–1)
BUN BLDV-MCNC: 12 MG/DL (ref 6–23)
CALCIUM SERPL-MCNC: 9.6 MG/DL (ref 8.3–10.6)
CHLORIDE BLD-SCNC: 105 MMOL/L (ref 99–110)
CO2: 21 MMOL/L (ref 21–32)
CREAT SERPL-MCNC: 0.8 MG/DL (ref 0.6–1.1)
DIFFERENTIAL TYPE: ABNORMAL
EOSINOPHILS ABSOLUTE: 0 K/CU MM
EOSINOPHILS RELATIVE PERCENT: 0 % (ref 0–3)
GFR AFRICAN AMERICAN: >60 ML/MIN/1.73M2
GFR NON-AFRICAN AMERICAN: >60 ML/MIN/1.73M2
GLUCOSE BLD-MCNC: 124 MG/DL (ref 70–99)
HCG QUALITATIVE: NEGATIVE
HCT VFR BLD CALC: 43.8 % (ref 37–47)
HEMOGLOBIN: 15.1 GM/DL (ref 12.5–16)
IMMATURE NEUTROPHIL %: 0.3 % (ref 0–0.43)
LYMPHOCYTES ABSOLUTE: 1.2 K/CU MM
LYMPHOCYTES RELATIVE PERCENT: 7.6 % (ref 24–44)
MCH RBC QN AUTO: 30.6 PG (ref 27–31)
MCHC RBC AUTO-ENTMCNC: 34.5 % (ref 32–36)
MCV RBC AUTO: 88.7 FL (ref 78–100)
MONOCYTES ABSOLUTE: 0.7 K/CU MM
MONOCYTES RELATIVE PERCENT: 4.4 % (ref 0–4)
NUCLEATED RBC %: 0 %
PDW BLD-RTO: 13.2 % (ref 11.7–14.9)
PLATELET # BLD: 308 K/CU MM (ref 140–440)
PMV BLD AUTO: 11.7 FL (ref 7.5–11.1)
POTASSIUM SERPL-SCNC: 3.5 MMOL/L (ref 3.5–5.1)
RBC # BLD: 4.94 M/CU MM (ref 4.2–5.4)
SEGMENTED NEUTROPHILS ABSOLUTE COUNT: 13.4 K/CU MM
SEGMENTED NEUTROPHILS RELATIVE PERCENT: 87.6 % (ref 36–66)
SODIUM BLD-SCNC: 140 MMOL/L (ref 135–145)
TOTAL IMMATURE NEUTOROPHIL: 0.05 K/CU MM
TOTAL NUCLEATED RBC: 0 K/CU MM
WBC # BLD: 15.3 K/CU MM (ref 4–10.5)

## 2022-02-08 PROCEDURE — 6360000002 HC RX W HCPCS: Performed by: PHYSICIAN ASSISTANT

## 2022-02-08 PROCEDURE — 93005 ELECTROCARDIOGRAM TRACING: CPT | Performed by: PHYSICIAN ASSISTANT

## 2022-02-08 PROCEDURE — 85025 COMPLETE CBC W/AUTO DIFF WBC: CPT

## 2022-02-08 PROCEDURE — 96372 THER/PROPH/DIAG INJ SC/IM: CPT

## 2022-02-08 PROCEDURE — 80048 BASIC METABOLIC PNL TOTAL CA: CPT

## 2022-02-08 PROCEDURE — 84703 CHORIONIC GONADOTROPIN ASSAY: CPT

## 2022-02-08 PROCEDURE — 6370000000 HC RX 637 (ALT 250 FOR IP): Performed by: PHYSICIAN ASSISTANT

## 2022-02-08 PROCEDURE — 99285 EMERGENCY DEPT VISIT HI MDM: CPT

## 2022-02-08 RX ORDER — ONDANSETRON 4 MG/1
4 TABLET, ORALLY DISINTEGRATING ORAL ONCE
Status: COMPLETED | OUTPATIENT
Start: 2022-02-08 | End: 2022-02-08

## 2022-02-08 RX ORDER — HALOPERIDOL 5 MG/ML
5 INJECTION INTRAMUSCULAR ONCE
Status: COMPLETED | OUTPATIENT
Start: 2022-02-08 | End: 2022-02-08

## 2022-02-08 RX ORDER — ONDANSETRON 4 MG/1
4 TABLET, FILM COATED ORAL EVERY 8 HOURS PRN
Qty: 10 TABLET | Refills: 0 | Status: SHIPPED | OUTPATIENT
Start: 2022-02-08 | End: 2022-09-16

## 2022-02-08 RX ORDER — PROMETHAZINE HYDROCHLORIDE 25 MG/ML
25 INJECTION, SOLUTION INTRAMUSCULAR; INTRAVENOUS ONCE
Status: COMPLETED | OUTPATIENT
Start: 2022-02-08 | End: 2022-02-08

## 2022-02-08 RX ADMIN — HALOPERIDOL LACTATE 5 MG: 5 INJECTION, SOLUTION INTRAMUSCULAR at 08:01

## 2022-02-08 RX ADMIN — ONDANSETRON 4 MG: 4 TABLET, ORALLY DISINTEGRATING ORAL at 10:02

## 2022-02-08 RX ADMIN — PROMETHAZINE HYDROCHLORIDE 25 MG: 25 INJECTION INTRAMUSCULAR; INTRAVENOUS at 06:36

## 2022-02-08 NOTE — ED NOTES
Pt was drinking out of the sink, causing emesis to occur. Educated pt not to eat or drink until told otherwise.      Anya Medina  02/08/22 8375

## 2022-02-08 NOTE — ED NOTES
Discussed discharge instructions with patient. All questions answered. Patient verbalized understanding.           Mouna Chaudhary RN  02/08/22 6618

## 2022-02-08 NOTE — ED NOTES
Bed: ED-30  Expected date:   Expected time:   Means of arrival:   Comments:  [facility] twp-emyancy Martinez RN  02/08/22 5567

## 2022-02-08 NOTE — ED PROVIDER NOTES
12 lead EKG per my interpretation:  Normal Sinus Rhythm at 82  Axis is   Normal  QTc is  within an acceptable range  There is no specific T wave changes appreciated. There is no specific ST wave changes appreciated. No STEMI    Prior EKG to compare with was available and no clinically significant change no morphology when compared to prior.     MD Fatimah Guzman MD  02/08/22 5246

## 2022-02-08 NOTE — ED NOTES
Went into pt room to administer Haldol. Pt was very agitated and complaining of nausea. Pt stated \"do you want to see what happens when I drink water\" and proceeded to vomit into emesis bag while making eye contact. Pt stated \"no one takes me seriously\".      Mica Reyes  02/08/22 0809

## 2022-02-08 NOTE — ED PROVIDER NOTES
Patient Identification  Remberto Brown is a 32 y.o. female    Chief Complaint  Positive For Covid-19 (seen at [facility] yesterday for same, sent home with medication, sts she cannot keep it down), Nausea, Emesis, and Diarrhea      HPI  (History provided by patient)  This is a 32 y.o. female who was brought in by self for chief complaint of covid. Symptoms began 2 days ago. Patient reports she has had intractable vomiting with hematemesis, diarrhea, cough, body aches. Was seen at [facility] ED yesterday and diagnosed with COVID and sent home with Phenergan. States that she cannot keep the Phenergan down and is continued to vomit. Did not receive Covid vaccine. No medical problems. Notes general body aches that are moderate in severity and constant. No CP, SOB. Patient frequently drinking from sink during initial ED evaluation. REVIEW OF SYSTEMS    10 systems reviewed and negative except as noted above. See HPI and nursing notes for additional information     I have reviewed the following nursing documentation:  Allergies: No Known Allergies    Past medical history:  has a past medical history of Patient denies medical problems. Past surgical history:  has a past surgical history that includes Tonsillectomy. Home medications:   Prior to Admission medications    Medication Sig Start Date End Date Taking?  Authorizing Provider   ondansetron (ZOFRAN) 4 MG tablet Take 1 tablet by mouth every 8 hours as needed for Nausea 2/8/22  Yes Gene Francisco PA-C   predniSONE (DELTASONE) 10 MG tablet Take 4 tabs for 1 day then 3 for 3 days then 1 for 3 days then 0.5 tab for 6 days 5/28/20   Hector Sin MD   Pren-Fe-Meth-FA-Omeg w/o A (PRENATE ESSENTIAL) 29-0.6-0.4-340 MG CAPS Take 1 capsule by mouth daily    Historical Provider, MD   metoclopramide (REGLAN) 5 MG tablet Take 5 mg by mouth 3 times daily as needed 5/20/20   Historical Provider, MD   ondansetron (ZOFRAN-ODT) 4 MG disintegrating tablet Take 4 mg by mouth every 6 hours 5/4/20   Historical Provider, MD       Social history:  reports that she has been smoking. She has a 0.50 pack-year smoking history. She has never used smokeless tobacco. She reports current drug use. Drug: Marijuana Deboraha Sanes). She reports that she does not drink alcohol. Family history:  History reviewed. No pertinent family history. Exam  /67   Pulse 87   Temp 97.8 °F (36.6 °C) (Oral)   Resp 16   SpO2 99%   Breastfeeding Unknown   Nursing note and vitals reviewed. Constitutional: Well developed, well nourished. No acute distress. HENT:      Head: Normocephalic and atraumatic. Ears: External ears normal.      Nose: Nose normal.     Mouth: Membrane mucosa moist and pink. No posterior oropharynx erythema or tonsillar edema  Eyes: Anicteric sclera. No discharge, PERRL  Neck: Supple. Trachea midline. Cardiovascular: RRR, no murmurs, rubs, or gallops, radial pulses 2+ bilaterally. Pulmonary/Chest: Effort normal. No respiratory distress. CTAB. No stridor. No wheezes. No rales. Abdominal: Soft. Nontender to palpation. No distension. No guarding, rebound tenderness, or evidence of ascites. : No CVA tenderness. Musculoskeletal: Moves all extremities. No gross deformity. Neurological: Alert and oriented to person, place, and time. Normal muscle tone. Skin: Warm and dry. No rash.   Psychiatric: Anxious      Radiographs (if obtained):  [] The following radiograph was interpreted by myself in the absence of a radiologist:   [x] Radiologist's Report Reviewed:  No orders to display          Labs  Results for orders placed or performed during the hospital encounter of 02/08/22   CBC Auto Differential   Result Value Ref Range    WBC 15.3 (H) 4.0 - 10.5 K/CU MM    RBC 4.94 4.2 - 5.4 M/CU MM    Hemoglobin 15.1 12.5 - 16.0 GM/DL    Hematocrit 43.8 37 - 47 %    MCV 88.7 78 - 100 FL    MCH 30.6 27 - 31 PG    MCHC 34.5 32.0 - 36.0 %    RDW 13.2 11.7 - 14.9 % Platelets 490 967 - 981 K/CU MM    MPV 11.7 (H) 7.5 - 11.1 FL    Differential Type AUTOMATED DIFFERENTIAL     Segs Relative 87.6 (H) 36 - 66 %    Lymphocytes % 7.6 (L) 24 - 44 %    Monocytes % 4.4 (H) 0 - 4 %    Eosinophils % 0.0 0 - 3 %    Basophils % 0.1 0 - 1 %    Segs Absolute 13.4 K/CU MM    Lymphocytes Absolute 1.2 K/CU MM    Monocytes Absolute 0.7 K/CU MM    Eosinophils Absolute 0.0 K/CU MM    Basophils Absolute 0.0 K/CU MM    Nucleated RBC % 0.0 %    Total Nucleated RBC 0.0 K/CU MM    Total Immature Neutrophil 0.05 K/CU MM    Immature Neutrophil % 0.3 0 - 0.43 %   HCG Serum, Qualitative   Result Value Ref Range    hCG Qual NEGATIVE    BMP   Result Value Ref Range    Sodium 140 135 - 145 MMOL/L    Potassium 3.5 3.5 - 5.1 MMOL/L    Chloride 105 99 - 110 mMol/L    CO2 21 21 - 32 MMOL/L    Anion Gap 14 4 - 16    BUN 12 6 - 23 MG/DL    CREATININE 0.8 0.6 - 1.1 MG/DL    Glucose 124 (H) 70 - 99 MG/DL    Calcium 9.6 8.3 - 10.6 MG/DL    GFR Non-African American >60 >60 mL/min/1.73m2    GFR African American >60 >60 mL/min/1.73m2   EKG 12 Lead   Result Value Ref Range    Ventricular Rate 82 BPM    Atrial Rate 82 BPM    P-R Interval 142 ms    QRS Duration 114 ms    Q-T Interval 418 ms    QTc Calculation (Bazett) 488 ms    P Axis 74 degrees    R Axis 76 degrees    T Axis 82 degrees    Diagnosis       Normal sinus rhythm  Prolonged QT  Abnormal ECG  When compared with ECG of 25-MAY-2020 23:02,  No significant change was found           MDM  Patient presents for COVID, reports intractable vomiting. She is drinking from the sink during initial evaluation. Her vital signs are unremarkable. She reports blood in her vomit, none noted during episodes of emesis here. Her hemoglobin is normal.  Pregnancy test is negative. EKG shows mild prolongation of QT interval, of note this was obtained after patient received Haldol. She also received Phenergan in ED without improvement. Given Zofran and now is tolerating p.o.   Plan is to discharge with prescription for p.o. Zofran. Discussed importance of home quarantine. Return to ED for any new or worsening symptoms. Assessment and plan discussed with patient understands and agrees. I have independently evaluated this patient. Final Impression  1. COVID-19    2. Non-intractable vomiting with nausea, unspecified vomiting type        Blood pressure 124/67, pulse 87, temperature 97.8 °F (36.6 °C), temperature source Oral, resp. rate 16, SpO2 99 %, unknown if currently breastfeeding. Disposition:  Discharge to home in stable condition. Patient was given scripts for the following medications. I counseled patient how to take these medications. Discharge Medication List as of 2/8/2022 10:14 AM      START taking these medications    Details   ondansetron (ZOFRAN) 4 MG tablet Take 1 tablet by mouth every 8 hours as needed for Nausea, Disp-10 tablet, R-0Normal             This chart was generated using the [address] [place] [id number] dictation system. I created this record but it may contain dictation errors given the limitations of this technology.        [address] [place], [place]-  02/08/22 3393

## 2022-02-10 LAB
EKG ATRIAL RATE: 82 BPM
EKG DIAGNOSIS: NORMAL
EKG P AXIS: 74 DEGREES
EKG P-R INTERVAL: 142 MS
EKG Q-T INTERVAL: 418 MS
EKG QRS DURATION: 114 MS
EKG QTC CALCULATION (BAZETT): 488 MS
EKG R AXIS: 76 DEGREES
EKG T AXIS: 82 DEGREES
EKG VENTRICULAR RATE: 82 BPM

## 2022-02-10 PROCEDURE — 93010 ELECTROCARDIOGRAM REPORT: CPT | Performed by: INTERNAL MEDICINE

## 2022-02-21 ENCOUNTER — OFFICE VISIT (OUTPATIENT)
Dept: CARDIOLOGY CLINIC | Age: 27
End: 2022-02-21
Payer: MEDICARE

## 2022-02-21 ENCOUNTER — NURSE ONLY (OUTPATIENT)
Dept: CARDIOLOGY CLINIC | Age: 27
End: 2022-02-21
Payer: MEDICARE

## 2022-02-21 VITALS
WEIGHT: 178.2 LBS | OXYGEN SATURATION: 98 % | DIASTOLIC BLOOD PRESSURE: 78 MMHG | SYSTOLIC BLOOD PRESSURE: 118 MMHG | HEART RATE: 95 BPM | HEIGHT: 69 IN | BODY MASS INDEX: 26.39 KG/M2

## 2022-02-21 DIAGNOSIS — R00.2 PALPITATION: Primary | ICD-10-CM

## 2022-02-21 PROCEDURE — 99204 OFFICE O/P NEW MOD 45 MIN: CPT | Performed by: INTERNAL MEDICINE

## 2022-02-21 PROCEDURE — 93270 REMOTE 30 DAY ECG REV/REPORT: CPT | Performed by: INTERNAL MEDICINE

## 2022-02-21 PROCEDURE — 4004F PT TOBACCO SCREEN RCVD TLK: CPT | Performed by: INTERNAL MEDICINE

## 2022-02-21 PROCEDURE — G8484 FLU IMMUNIZE NO ADMIN: HCPCS | Performed by: INTERNAL MEDICINE

## 2022-02-21 PROCEDURE — G8419 CALC BMI OUT NRM PARAM NOF/U: HCPCS | Performed by: INTERNAL MEDICINE

## 2022-02-21 PROCEDURE — G8427 DOCREV CUR MEDS BY ELIG CLIN: HCPCS | Performed by: INTERNAL MEDICINE

## 2022-02-21 RX ORDER — DIVALPROEX SODIUM 250 MG/1
750 TABLET, EXTENDED RELEASE ORAL 2 TIMES DAILY
COMMUNITY
Start: 2022-02-16 | End: 2022-06-08 | Stop reason: SDUPTHER

## 2022-02-21 NOTE — PROGRESS NOTES
30 days e-cardio monitor placed.  # [id number]. Instructed patient on how to record the event and to call monitoring center at [phone] if any problems arise. Instructed patient to disconnect the lead wires from the electrodes before bathing or showering and reattach them afterwards. Instructed patient that the electrodes should be changed every 3 days or if they no longer adhere to the skin. Patient to mail package after the monitor has ended. Patient verbalized understanding.

## 2022-02-21 NOTE — PROGRESS NOTES
CARDIOLOGY NOTE      2/21/2022    RE: William Nolan  (1995)                               TO:  Dr. Gaytan primary care provider on file. New patient      CHIEF COMPLAINT   Nai Steve is a 32 y.o. female who was seen today for management of chest pain, palpitation                                    HPI:                   Pt has h/o chest pain, palpitation negative cardiac work-up done with Dr. Mouna Moraes in [place], positive Covid 2/7/2022, seen today for establishing here. Pt has been having an tachybradycardia, her heart rate was very fast was taken to my [facility], she was started on a medication for bipolar disorder, had a bradycardic episode looks like she got atropine and her heart rate shot up now she is here for follow-up for palpitations    William Nolan has the following history recorded in care path:  Patient Active Problem List    Diagnosis Date Noted    Hyperemesis gravidarum 05/27/2020    Vomiting 05/26/2020    Nausea and vomiting 04/21/2020    Encounter for IUD removal 08/16/2013     Current Outpatient Medications   Medication Sig Dispense Refill    ondansetron (ZOFRAN) 4 MG tablet Take 1 tablet by mouth every 8 hours as needed for Nausea 10 tablet 0    predniSONE (DELTASONE) 10 MG tablet Take 4 tabs for 1 day then 3 for 3 days then 1 for 3 days then 0.5 tab for 6 days 10 tablet 0    Pren-Fe-Meth-FA-Omeg w/o A (PRENATE ESSENTIAL) 29-0.6-0.4-340 MG CAPS Take 1 capsule by mouth daily      metoclopramide (REGLAN) 5 MG tablet Take 5 mg by mouth 3 times daily as needed      ondansetron (ZOFRAN-ODT) 4 MG disintegrating tablet Take 4 mg by mouth every 6 hours       No current facility-administered medications for this visit. Allergies: Patient has no known allergies. Past Medical History:   Diagnosis Date    Patient denies medical problems      Past Surgical History:   Procedure Laterality Date    TONSILLECTOMY        As reviewed No family history on file.   Social History     Tobacco Use    Smoking status: Current Every Day Smoker     Packs/day: 0.50     Years: 1.00     Pack years: 0.50    Smokeless tobacco: Never Used   Substance Use Topics    Alcohol use: No        Objective: There were no vitals filed for this visit. There were no vitals taken for this visit. No flowsheet data found. Wt Readings from Last 3 Encounters:   05/27/20 182 lb (82.6 kg)   04/24/20 167 lb (75.8 kg)   08/16/13 158 lb (71.7 kg) (88 %, Z= 1.20)*     * Growth percentiles are based on [facility] (Girls, 2-20 Years) data. There is no height or weight on file to calculate BMI. GENERAL - Alert, oriented, pleasant, in no apparent distress. EYES: No jaundice, no conjunctival pallor. SKIN: It is warm & dry. No rashes. No Echhymosis    HEENT - No clinically significant abnormalities seen. Neck - Supple. No jugular venous distention noted. No carotid bruits. Cardiovascular - Normal S1 and S2 without obvious murmur or gallop. Extremities - No cyanosis, clubbing, or significant edema. Pulmonary - No respiratory distress. No wheezes or rales. Abdomen - No masses, tenderness, or organomegaly. Musculoskeletal - No significant edema. No joint deformities. No muscle wasting. Neurologic - Cranial nerves II through XII are grossly intact. There were no gross focal neurologic abnormalities.     Lab Review   Lab Results   Component Value Date    TROPONINT <0.010 04/21/2020     BNP:  No results found for: BNP  PT/INR:  No results found for: INR  No results found for: LABA1C  Lab Results   Component Value Date    WBC 15.3 (H) 02/08/2022    HCT 43.8 02/08/2022    MCV 88.7 02/08/2022     02/08/2022     No results found for: CHOL, TRIG, HDL, LDLCALC, LDLDIRECT, CHOLHDLRATIO  Lab Results   Component Value Date    ALT 22 05/28/2020    AST 13 (L) 05/28/2020     BMP:    Lab Results   Component Value Date     02/08/2022    K 3.5 02/08/2022     02/08/2022    CO2 21 02/08/2022    BUN 12 02/08/2022    CREATININE 0.8 02/08/2022     CMP:   Lab Results   Component Value Date     02/08/2022    K 3.5 02/08/2022     02/08/2022    CO2 21 02/08/2022    BUN 12 02/08/2022    PROT 5.3 05/28/2020     TSH:    Lab Results   Component Value Date    TSHHS 0.255 05/26/2020           Assessment & Plan:    -Precordial chest pain; patient had stress echo done which was unremarkable late last year with normal EF and normal exercise capacity  There were no EKG changes    -Palpitations: It is unclear exactly what arrhythmia she is having hence will obtain a event monitor to see what type of arrhythmia she is having when she is feeling this palpitations  Getting a prolonged monitoring given that the symptoms are in the feeling of palpitations or not very frequent  Patient per record is on Depakote 750 mg p.o. twice daily which can occasionally cause arrhythmias    - TSH was low on the last test suggestive of hyperthyroidism hence we will also recheck her TSH with T4 level  Hyperthyroidism can cause tachyarrhythmias    -Covid positive: better, with no shortness of breath now    -Shortness of breath; Echo was done which showed that the EF is normal        Ryan Jordan MD    [facility]    Please note this report has been partially produced using speech recognition software and may contain errors related to that system including errors in grammar, punctuation, and spelling, as well as words and phrases that may be inappropriate. If there are any questions or concerns please feel free to contact the dictating provider for clarification.

## 2022-02-21 NOTE — PATIENT INSTRUCTIONS
**It is YOUR responsibilty to bring medication bottles and/or updated medication list to [address] [place] [address]. This will allow us to better serve you and all your healthcare needs**    Please be informed that if you contact our office outside of normal business hours the physician on call cannot help with any scheduling or rescheduling issues, procedure instruction questions or any type of medication issue. We advise you for any urgent/emergency that you go to the nearest emergency room!     PLEASE CALL OUR OFFICE DURING NORMAL BUSINESS HOURS    Monday - Friday   8 am to 5 pm    [place]: [phone]    [place]:  [phone]

## 2022-03-31 PROCEDURE — 93272 ECG/REVIEW INTERPRET ONLY: CPT | Performed by: INTERNAL MEDICINE

## 2022-04-03 ENCOUNTER — HOSPITAL ENCOUNTER (OUTPATIENT)
Age: 27
Setting detail: OBSERVATION
Discharge: HOME | End: 2022-04-05
Attending: INTERNAL MEDICINE | Admitting: INTERNAL MEDICINE
Payer: MEDICARE

## 2022-04-03 DIAGNOSIS — E87.6 HYPOKALEMIA: ICD-10-CM

## 2022-04-03 DIAGNOSIS — R11.2 INTRACTABLE VOMITING WITH NAUSEA, UNSPECIFIED VOMITING TYPE: Primary | ICD-10-CM

## 2022-04-03 DIAGNOSIS — R10.84 GENERALIZED ABDOMINAL PAIN: ICD-10-CM

## 2022-04-03 DIAGNOSIS — R74.8 ELEVATED LIPASE: ICD-10-CM

## 2022-04-03 LAB
ALBUMIN SERPL-MCNC: 4.7 GM/DL (ref 3.4–5)
ALP BLD-CCNC: 84 IU/L (ref 40–129)
ALT SERPL-CCNC: 22 U/L (ref 10–40)
ANION GAP SERPL CALCULATED.3IONS-SCNC: 15 MMOL/L (ref 4–16)
AST SERPL-CCNC: 21 IU/L (ref 15–37)
BACTERIA: NEGATIVE /HPF
BASOPHILS ABSOLUTE: 0 K/CU MM
BASOPHILS RELATIVE PERCENT: 0.2 % (ref 0–1)
BILIRUB SERPL-MCNC: 0.8 MG/DL (ref 0–1)
BILIRUBIN URINE: NEGATIVE MG/DL
BLOOD, URINE: NEGATIVE
BUN BLDV-MCNC: 11 MG/DL (ref 6–23)
CALCIUM SERPL-MCNC: 8.9 MG/DL (ref 8.3–10.6)
CHLORIDE BLD-SCNC: 103 MMOL/L (ref 99–110)
CLARITY: CLEAR
CO2: 23 MMOL/L (ref 21–32)
COLOR: YELLOW
CREAT SERPL-MCNC: 0.7 MG/DL (ref 0.6–1.1)
DIFFERENTIAL TYPE: ABNORMAL
EOSINOPHILS ABSOLUTE: 0 K/CU MM
EOSINOPHILS RELATIVE PERCENT: 0 % (ref 0–3)
GFR AFRICAN AMERICAN: >60 ML/MIN/1.73M2
GFR NON-AFRICAN AMERICAN: >60 ML/MIN/1.73M2
GLUCOSE BLD-MCNC: 101 MG/DL (ref 70–99)
GLUCOSE, URINE: NEGATIVE MG/DL
HCG QUALITATIVE: NEGATIVE
HCT VFR BLD CALC: 42.6 % (ref 37–47)
HEMOGLOBIN: 13.7 GM/DL (ref 12.5–16)
IMMATURE NEUTROPHIL %: 0.5 % (ref 0–0.43)
KETONES, URINE: 40 MG/DL
LEUKOCYTE ESTERASE, URINE: NEGATIVE
LIPASE: 118 IU/L (ref 13–60)
LYMPHOCYTES ABSOLUTE: 1.2 K/CU MM
LYMPHOCYTES RELATIVE PERCENT: 9.3 % (ref 24–44)
MAGNESIUM: 1.9 MG/DL (ref 1.8–2.4)
MCH RBC QN AUTO: 31.1 PG (ref 27–31)
MCHC RBC AUTO-ENTMCNC: 32.2 % (ref 32–36)
MCV RBC AUTO: 96.6 FL (ref 78–100)
MONOCYTES ABSOLUTE: 0.7 K/CU MM
MONOCYTES RELATIVE PERCENT: 5.4 % (ref 0–4)
MUCUS: ABNORMAL HPF
NITRITE URINE, QUANTITATIVE: NEGATIVE
NUCLEATED RBC %: 0 %
PDW BLD-RTO: 13.7 % (ref 11.7–14.9)
PH, URINE: 8.5 (ref 5–8)
PLATELET # BLD: 238 K/CU MM (ref 140–440)
PMV BLD AUTO: 11 FL (ref 7.5–11.1)
POTASSIUM SERPL-SCNC: 3.3 MMOL/L (ref 3.5–5.1)
PROTEIN UA: 30 MG/DL
RBC # BLD: 4.41 M/CU MM (ref 4.2–5.4)
RBC URINE: ABNORMAL /HPF (ref 0–6)
SARS-COV-2, NAAT: NOT DETECTED
SEGMENTED NEUTROPHILS ABSOLUTE COUNT: 10.9 K/CU MM
SEGMENTED NEUTROPHILS RELATIVE PERCENT: 84.6 % (ref 36–66)
SODIUM BLD-SCNC: 141 MMOL/L (ref 135–145)
SOURCE: NORMAL
SPECIFIC GRAVITY UA: 1.01 (ref 1–1.03)
SQUAMOUS EPITHELIAL: 2 /HPF
TOTAL IMMATURE NEUTOROPHIL: 0.06 K/CU MM
TOTAL NUCLEATED RBC: 0 K/CU MM
TOTAL PROTEIN: 7.1 GM/DL (ref 6.4–8.2)
TRICHOMONAS: ABNORMAL /HPF
UROBILINOGEN, URINE: 0.2 MG/DL (ref 0.2–1)
WBC # BLD: 12.9 K/CU MM (ref 4–10.5)
WBC UA: 1 /HPF (ref 0–5)

## 2022-04-03 PROCEDURE — G0378 HOSPITAL OBSERVATION PER HR: HCPCS

## 2022-04-03 PROCEDURE — 2580000003 HC RX 258: Performed by: PHYSICIAN ASSISTANT

## 2022-04-03 PROCEDURE — 99284 EMERGENCY DEPT VISIT MOD MDM: CPT

## 2022-04-03 PROCEDURE — 83690 ASSAY OF LIPASE: CPT

## 2022-04-03 PROCEDURE — 85025 COMPLETE CBC W/AUTO DIFF WBC: CPT

## 2022-04-03 PROCEDURE — 96374 THER/PROPH/DIAG INJ IV PUSH: CPT

## 2022-04-03 PROCEDURE — 81001 URINALYSIS AUTO W/SCOPE: CPT

## 2022-04-03 PROCEDURE — 80053 COMPREHEN METABOLIC PANEL: CPT

## 2022-04-03 PROCEDURE — 84703 CHORIONIC GONADOTROPIN ASSAY: CPT

## 2022-04-03 PROCEDURE — 96375 TX/PRO/DX INJ NEW DRUG ADDON: CPT

## 2022-04-03 PROCEDURE — 96372 THER/PROPH/DIAG INJ SC/IM: CPT

## 2022-04-03 PROCEDURE — 83735 ASSAY OF MAGNESIUM: CPT

## 2022-04-03 PROCEDURE — 6360000002 HC RX W HCPCS: Performed by: PHYSICIAN ASSISTANT

## 2022-04-03 PROCEDURE — 96361 HYDRATE IV INFUSION ADD-ON: CPT

## 2022-04-03 PROCEDURE — 87635 SARS-COV-2 COVID-19 AMP PRB: CPT

## 2022-04-03 RX ORDER — DICYCLOMINE HCL 20 MG
20 TABLET ORAL ONCE
Status: DISCONTINUED | OUTPATIENT
Start: 2022-04-03 | End: 2022-04-05 | Stop reason: HOSPADM

## 2022-04-03 RX ORDER — POTASSIUM CHLORIDE 7.45 MG/ML
10 INJECTION INTRAVENOUS ONCE
Status: DISCONTINUED | OUTPATIENT
Start: 2022-04-03 | End: 2022-04-05 | Stop reason: HOSPADM

## 2022-04-03 RX ORDER — CAPSAICIN 0.025 %
CREAM (GRAM) TOPICAL ONCE
Status: DISCONTINUED | OUTPATIENT
Start: 2022-04-03 | End: 2022-04-05 | Stop reason: HOSPADM

## 2022-04-03 RX ORDER — LORAZEPAM 2 MG/ML
1 INJECTION INTRAMUSCULAR ONCE
Status: COMPLETED | OUTPATIENT
Start: 2022-04-03 | End: 2022-04-03

## 2022-04-03 RX ORDER — 0.9 % SODIUM CHLORIDE 0.9 %
1000 INTRAVENOUS SOLUTION INTRAVENOUS ONCE
Status: COMPLETED | OUTPATIENT
Start: 2022-04-03 | End: 2022-04-03

## 2022-04-03 RX ORDER — 0.9 % SODIUM CHLORIDE 0.9 %
1000 INTRAVENOUS SOLUTION INTRAVENOUS ONCE
Status: DISCONTINUED | OUTPATIENT
Start: 2022-04-03 | End: 2022-04-05 | Stop reason: HOSPADM

## 2022-04-03 RX ORDER — ONDANSETRON 2 MG/ML
4 INJECTION INTRAMUSCULAR; INTRAVENOUS ONCE
Status: COMPLETED | OUTPATIENT
Start: 2022-04-03 | End: 2022-04-03

## 2022-04-03 RX ORDER — PROMETHAZINE HYDROCHLORIDE 25 MG/ML
25 INJECTION, SOLUTION INTRAMUSCULAR; INTRAVENOUS ONCE
Status: COMPLETED | OUTPATIENT
Start: 2022-04-03 | End: 2022-04-03

## 2022-04-03 RX ORDER — FAMOTIDINE 20 MG/1
20 TABLET, FILM COATED ORAL 2 TIMES DAILY
Status: DISCONTINUED | OUTPATIENT
Start: 2022-04-03 | End: 2022-04-05 | Stop reason: HOSPADM

## 2022-04-03 RX ADMIN — PROMETHAZINE HYDROCHLORIDE 25 MG: 25 INJECTION INTRAMUSCULAR; INTRAVENOUS at 21:19

## 2022-04-03 RX ADMIN — SODIUM CHLORIDE 1000 ML: 9 INJECTION, SOLUTION INTRAVENOUS at 21:19

## 2022-04-03 RX ADMIN — LORAZEPAM 1 MG: 2 INJECTION INTRAMUSCULAR; INTRAVENOUS at 21:52

## 2022-04-03 RX ADMIN — ONDANSETRON 4 MG: 2 INJECTION INTRAMUSCULAR; INTRAVENOUS at 21:51

## 2022-04-04 PROCEDURE — 96375 TX/PRO/DX INJ NEW DRUG ADDON: CPT

## 2022-04-04 PROCEDURE — 96372 THER/PROPH/DIAG INJ SC/IM: CPT

## 2022-04-04 PROCEDURE — 6370000000 HC RX 637 (ALT 250 FOR IP): Performed by: INTERNAL MEDICINE

## 2022-04-04 PROCEDURE — G0378 HOSPITAL OBSERVATION PER HR: HCPCS

## 2022-04-04 PROCEDURE — 6360000002 HC RX W HCPCS: Performed by: INTERNAL MEDICINE

## 2022-04-04 PROCEDURE — 94761 N-INVAS EAR/PLS OXIMETRY MLT: CPT

## 2022-04-04 PROCEDURE — 96376 TX/PRO/DX INJ SAME DRUG ADON: CPT

## 2022-04-04 PROCEDURE — 96361 HYDRATE IV INFUSION ADD-ON: CPT

## 2022-04-04 RX ORDER — ACETAMINOPHEN 650 MG/1
650 SUPPOSITORY RECTAL EVERY 6 HOURS PRN
Status: DISCONTINUED | OUTPATIENT
Start: 2022-04-04 | End: 2022-04-05 | Stop reason: HOSPADM

## 2022-04-04 RX ORDER — ONDANSETRON 2 MG/ML
4 INJECTION INTRAMUSCULAR; INTRAVENOUS EVERY 4 HOURS PRN
Status: DISCONTINUED | OUTPATIENT
Start: 2022-04-04 | End: 2022-04-05 | Stop reason: HOSPADM

## 2022-04-04 RX ORDER — ACETAMINOPHEN 325 MG/1
650 TABLET ORAL EVERY 6 HOURS PRN
Status: DISCONTINUED | OUTPATIENT
Start: 2022-04-04 | End: 2022-04-05 | Stop reason: HOSPADM

## 2022-04-04 RX ORDER — SODIUM CHLORIDE 0.9 % (FLUSH) 0.9 %
5-40 SYRINGE (ML) INJECTION EVERY 12 HOURS SCHEDULED
Status: DISCONTINUED | OUTPATIENT
Start: 2022-04-04 | End: 2022-04-05 | Stop reason: HOSPADM

## 2022-04-04 RX ORDER — PROCHLORPERAZINE EDISYLATE 5 MG/ML
10 INJECTION INTRAMUSCULAR; INTRAVENOUS EVERY 6 HOURS PRN
Status: DISCONTINUED | OUTPATIENT
Start: 2022-04-04 | End: 2022-04-05 | Stop reason: HOSPADM

## 2022-04-04 RX ORDER — SODIUM CHLORIDE 0.9 % (FLUSH) 0.9 %
5-40 SYRINGE (ML) INJECTION PRN
Status: DISCONTINUED | OUTPATIENT
Start: 2022-04-04 | End: 2022-04-05 | Stop reason: HOSPADM

## 2022-04-04 RX ORDER — ONDANSETRON 4 MG/1
4 TABLET, ORALLY DISINTEGRATING ORAL EVERY 8 HOURS PRN
Status: DISCONTINUED | OUTPATIENT
Start: 2022-04-04 | End: 2022-04-05 | Stop reason: HOSPADM

## 2022-04-04 RX ORDER — SODIUM CHLORIDE AND POTASSIUM CHLORIDE .9; .15 G/100ML; G/100ML
SOLUTION INTRAVENOUS CONTINUOUS
Status: DISCONTINUED | OUTPATIENT
Start: 2022-04-04 | End: 2022-04-05 | Stop reason: HOSPADM

## 2022-04-04 RX ORDER — SODIUM CHLORIDE 9 MG/ML
INJECTION, SOLUTION INTRAVENOUS PRN
Status: DISCONTINUED | OUTPATIENT
Start: 2022-04-04 | End: 2022-04-05 | Stop reason: HOSPADM

## 2022-04-04 RX ORDER — LORAZEPAM 2 MG/ML
0.5 INJECTION INTRAMUSCULAR ONCE
Status: COMPLETED | OUTPATIENT
Start: 2022-04-04 | End: 2022-04-04

## 2022-04-04 RX ORDER — POLYETHYLENE GLYCOL 3350 17 G/17G
17 POWDER, FOR SOLUTION ORAL DAILY PRN
Status: DISCONTINUED | OUTPATIENT
Start: 2022-04-04 | End: 2022-04-05 | Stop reason: HOSPADM

## 2022-04-04 RX ORDER — ONDANSETRON 2 MG/ML
4 INJECTION INTRAMUSCULAR; INTRAVENOUS EVERY 6 HOURS PRN
Status: DISCONTINUED | OUTPATIENT
Start: 2022-04-04 | End: 2022-04-04

## 2022-04-04 RX ORDER — ONDANSETRON 4 MG/1
4 TABLET, ORALLY DISINTEGRATING ORAL EVERY 8 HOURS PRN
Status: DISCONTINUED | OUTPATIENT
Start: 2022-04-04 | End: 2022-04-04

## 2022-04-04 RX ADMIN — ONDANSETRON 4 MG: 4 TABLET, ORALLY DISINTEGRATING ORAL at 22:15

## 2022-04-04 RX ADMIN — ONDANSETRON 4 MG: 4 TABLET, ORALLY DISINTEGRATING ORAL at 12:15

## 2022-04-04 RX ADMIN — POTASSIUM CHLORIDE AND SODIUM CHLORIDE: 900; 150 INJECTION, SOLUTION INTRAVENOUS at 15:52

## 2022-04-04 RX ADMIN — POTASSIUM CHLORIDE AND SODIUM CHLORIDE: 900; 150 INJECTION, SOLUTION INTRAVENOUS at 01:59

## 2022-04-04 RX ADMIN — ENOXAPARIN SODIUM 40 MG: 100 INJECTION SUBCUTANEOUS at 08:35

## 2022-04-04 RX ADMIN — PROCHLORPERAZINE EDISYLATE 10 MG: 5 INJECTION INTRAMUSCULAR; INTRAVENOUS at 03:21

## 2022-04-04 RX ADMIN — FAMOTIDINE 20 MG: 20 TABLET ORAL at 22:15

## 2022-04-04 RX ADMIN — LORAZEPAM 0.5 MG: 2 INJECTION INTRAMUSCULAR; INTRAVENOUS at 03:21

## 2022-04-04 RX ADMIN — FAMOTIDINE 20 MG: 20 TABLET ORAL at 08:35

## 2022-04-04 NOTE — PROGRESS NOTES
4 Eyes Skin Assessment     NAME:  Dontae Cantrell  YOB: 1995  MEDICAL RECORD NUMBER:  [id number]    The patient is being assess for  Admission    I agree that 2 RN's have performed a thorough Head to Toe Skin Assessment on the patient. ALL assessment sites listed below have been assessed. Areas assessed by both nurses:    Head, Face, Ears, Shoulders, Back, Chest, Arms, Elbows, Hands, Sacrum. Buttock, Coccyx, Ischium and Legs. Feet and Heels        Does the Patient have a Wound?  No noted wound(s)       Geovany Prevention initiated:  No   Wound Care Orders initiated:  No    Pressure Injury (Stage 3,4, Unstageable, DTI, NWPT, and Complex wounds) if present place consult order under [de-identified] No    New and Established Ostomies if present place consult order under : No      Nurse 1 eSignature: Electronically signed by Anthony Obrien LPN on 2/8/19 at 5:96 AM EDT    **SHARE this note so that the co-signing nurse is able to place an eSignature**    Nurse 2 eSignature: Electronically signed by Claudene Ribas, RN on 4/4/22 at 3:29 AM EDT

## 2022-04-04 NOTE — ED NOTES
The patient presents to the emergency department by EMS alert and oriented with a complaint of vomiting for two days. The patient denies any pain. The patient placed on the monitor with vital signs taken. Assessment as follows; Skin is pink, warm and dry. Julia Anderson RN  04/03/22 2037

## 2022-04-04 NOTE — PROGRESS NOTES
ATTENDING PHYSICIAN'S PROGRESS NOTES    Patient:  Sam Lubin      Unit/Bed:1116/1116-A    YOB: 1995    MRN: [id number]     Acct: [de-identified]     Admit date: 4/3/2022    Patient Seen, Chart, Consults notes, Labs, Radiology studies reviewed. SUBJECTIVE:   Day 0 of stay with nausea,vomiting and abd pain. Seen at bedside this morning and evaluated. States she is still having nausea and could not take much of her clear liquid diet. Refused to advance diet stating not ready. States she cant go home today because she will be right back. Admits to marijuana use but states last used late feb/early march. She has a 1 and 3 yr old kids. Complains of belly pain in the epigastrium but exam is benign. All other ROS negative except noted in HPI    Past, Family, Social History unchanged from admission. Diet:  ADULT DIET; Full Liquid    Medications:  Scheduled Meds:   sodium chloride flush  5-40 mL IntraVENous 2 times per day    enoxaparin  40 mg SubCUTAneous Daily    dicyclomine  20 mg Oral Once    sodium chloride  1,000 mL IntraVENous Once    potassium chloride  10 mEq IntraVENous Once    capsaicin   Topical Once    famotidine  20 mg Oral BID     Continuous Infusions:   sodium chloride      0.9% NaCl with KCl 20 mEq 125 mL/hr at 04/04/22 1552     PRN Meds:sodium chloride flush, sodium chloride, polyethylene glycol, acetaminophen **OR** acetaminophen, prochlorperazine, ondansetron **OR** ondansetron, benzocaine    OBJECTIVE:  CBC:   Recent Labs     04/03/22 2119   WBC 12.9*   HGB 13.7        BMP:    Recent Labs     04/03/22 2119      K 3.3*      CO2 23   BUN 11   CREATININE 0.7   GLUCOSE 101*     Calcium:  Recent Labs     04/03/22 2119   CALCIUM 8.9     Ionized Calcium:No results for input(s): IONCA in the last 72 hours. Magnesium:  Recent Labs     04/03/22 2119   MG 1.9     Phosphorus:No results for input(s): PHOS in the last 72 hours.   BNP:No results for input(s): BNP in the last 72 hours. Glucose:No results for input(s): POCGLU in the last 72 hours. HgbA1C: No results for input(s): LABA1C in the last 72 hours. INR: No results for input(s): INR in the last 72 hours. Hepatic:   Recent Labs     04/03/22  2119   ALKPHOS 84   ALT 22   AST 21   PROT 7.1   BILITOT 0.8   LABALBU 4.7     Amylase and Lipase:No results for input(s): LACTA, AMYLASE in the last 72 hours. Lactic Acid: No results for input(s): LACTA in the last 72 hours. Troponin: No results for input(s): CKTOTAL, CKMB, TROPONINT in the last 72 hours. BNP: No results for input(s): BNP in the last 72 hours. Lipids: No results for input(s): CHOL, TRIG, HDL, LDL, LDLCALC in the last 72 hours. ABGs: No results found for: PH, PCO2, PO2, HCO3, O2SAT    Radiology reports as per the Radiologist  Radiology: No results found. Physical Exam:  Vitals: /65   Pulse 67   Temp 98.5 °F (36.9 °C) (Oral)   Resp 17   Ht 5' 9\" (1.753 m)   Wt 176 lb 5.9 oz (80 kg)   SpO2 97%   BMI 26.05 kg/m²   24 hour intake/output:    Intake/Output Summary (Last 24 hours) at 4/4/2022 1625  Last data filed at 4/3/2022 2219  Gross per 24 hour   Intake 1000 ml   Output --   Net 1000 ml     Last 3 weights:   Wt Readings from Last 3 Encounters:   04/04/22 176 lb 5.9 oz (80 kg)   02/21/22 178 lb 3.2 oz (80.8 kg)   05/27/20 182 lb (82.6 kg)       General appearance - alert, well appearing, and in no distress  HEENT: Normocephalic, Atraumatic, Conjuctiva pink, PERRL, Oral mucosa normal, Lips, teeth and gums normal, Trachea midline, Thyroid normal and No noted lymphadenopathy  Chest - clear to auscultation, no wheezes, rales or rhonchi, symmetric air entry  Cardiovascular - normal rate, regular rhythm, normal S1, S2, no murmurs, rubs, clicks or gallops  Abdomen - soft, nontender, nondistended, no masses or organomegaly   Neurological - Alert and oriented, Normal speech, No focal findings or movement disorder noted and Motor and sensory grossly normal bilaterally  Integumentary - Skin color, texture, turgor normal. No Rashes or lesions  Musculoskeletal -Full ROM times 4 extremities, No clubbing or cyanosis and No peripheral edema      DVT prophylaxis: [x] Lovenox                                 [] SCDs                                 [] SQ Heparin                                 [] Encourage ambulation           [] Already on Anticoagulation                 ASSESSMENT / PLAN :    Principal Problem:    Intractable nausea and vomiting  Resolved Problems:    * No resolved hospital problems. *     Intractable nausea, vomiting  -Suspected hyperemesis syndrome secondary to marijuana.  -Patient admits to using marijuana 2 weeks ago. -Patient was admitted to [facility] (2/12-2/16/2022) for similar complaints, and was seen in [facility] ED twice 04/03. -CT abdomen/pelvis-done at [facility] acute abnormality. Continue iv fluid,antiemetics and advance diet as tolerated. Plan to discharge tomorrow.     #.  Mild hypokalemia  -Potassium supplementation done. -Magnesium good at 1.9  -Recheck in AM.  -Continue IV fluids with potassium     #.  Leukocytosis-possibly reactive  -Trending down when compared to labs from care everywhere. -Repeat in AM.     #.  Bipolar disorder  -Diagnosed recently during admission to [address] [place] [address] prescribed valproic acid.  -Patient does not take any medications at home.     #.  COVID-19-2/7/2022. Disposition; Home tomorrow.         Electronically signed by Toribio Boggs MD on 4/4/2022 at 4:25 PM    Rounding Hospitalist

## 2022-04-04 NOTE — ED PROVIDER NOTES
EMERGENCY DEPARTMENT ENCOUNTER      PCP: No primary care provider on file. CHIEF COMPLAINT    Chief Complaint   Patient presents with    Emesis       This patient was not evaluated by the attending physician. I have independently evaluated this patient. HPI    Rickey Campoverde is a 32 y.o. female who presents with nausea vomiting and diarrhea. Onset 2 days ago. Patient states diarrhea is improved since yesterday. Patient has generalized abdominal cramping pains. Patient denies contacts with similar symptoms. Patient denies cough, chest pain or shortness of breath. Patient states he has a sore throat due to frequent vomiting. Patient denies any urinary symptoms. Patient denies pregnancy. Patient denies fever. REVIEW OF SYSTEMS    Constitutional:  Denies fever  HENT:  See HPI  Cardiovascular:  Denies chest pain  Respiratory:  Denies cough or shortness of breath   GI:  See HPI above  : No hematuria or dysuria. Musculoskeletal: No pain or swelling of extremities.   Skin:  Denies rash  Neurologic:  Denies focal weakness or sensory changes   Lymphatic:  Denies swollen glands     All other review of systems are negative  See HPI and nursing notes for additional information     PAST MEDICAL & SURGICAL HISTORY    Past Medical History:   Diagnosis Date    Patient denies medical problems      Past Surgical History:   Procedure Laterality Date    TONSILLECTOMY         CURRENT MEDICATIONS    Current Outpatient Rx   Medication Sig Dispense Refill    divalproex (DEPAKOTE ER) 250 MG extended release tablet Take 750 mg by mouth 2 times daily      ondansetron (ZOFRAN) 4 MG tablet Take 1 tablet by mouth every 8 hours as needed for Nausea 10 tablet 0    Pren-Fe-Meth-FA-Omeg w/o A (PRENATE ESSENTIAL) 29-0.6-0.4-340 MG CAPS Take 1 capsule by mouth daily (Patient not taking: Reported on 2/21/2022)      ondansetron (ZOFRAN-ODT) 4 MG disintegrating tablet Take 4 mg by mouth every 6 hours         ALLERGIES No Known Allergies    SOCIAL AND FAMILY HISTORY    Social History     Socioeconomic History    Marital status:      Spouse name: Not on file    Number of children: Not on file    Years of education: Not on file    Highest education level: Not on file   Occupational History    Not on file   Tobacco Use    Smoking status: Current Every Day Smoker     Packs/day: 0.50     Years: 1.00     Pack years: 0.50    Smokeless tobacco: Never Used   Substance and Sexual Activity    Alcohol use: No    Drug use: Yes     Types: Marijuana [place])     Comment: occ    Sexual activity: Yes   Other Topics Concern    Not on file   Social History Narrative    Not on file     Social Determinants of Health     Financial Resource Strain:     Difficulty of Paying Living Expenses: Not on file   Food Insecurity:     Worried About Running Out of Food in the Last Year: Not on file    Richard of Food in the Last Year: Not on file   Transportation Needs:     Lack of Transportation (Medical): Not on file    Lack of Transportation (Non-Medical):  Not on file   Physical Activity:     Days of Exercise per Week: Not on file    Minutes of Exercise per Session: Not on file   Stress:     Feeling of Stress : Not on file   Social Connections:     Frequency of Communication with Friends and Family: Not on file    Frequency of Social Gatherings with Friends and Family: Not on file    Attends Taoism Services: Not on file    Active Member of [address] [place] [address] or Organizations: Not on file    Attends Club or Organization Meetings: Not on file    Marital Status: Not on file   Intimate Partner Violence:     Fear of Current or Ex-Partner: Not on file    Emotionally Abused: Not on file    Physically Abused: Not on file    Sexually Abused: Not on file   Housing Stability:     Unable to Pay for Housing in the Last Year: Not on file    Number of [place] in the Last Year: Not on file    Unstable Housing in the Last Year: Not on file No family history on file. PHYSICAL EXAM    VITAL SIGNS: /78   Pulse 81   Temp 97.5 °F (36.4 °C) (Oral)   Resp 20   SpO2 100%   Constitutional:  Well developed, well nourished  Eyes:  Sclera nonicteric, conjunctiva moist  HENT:  Atraumatic. PERRL. Oropharynx is clear. Mildly dry mucous membranes. Neck/Lymphatics: supple, no JVD, no swollen nodes  Respiratory:  No retractions, no accessory muscle use, normal breath sounds   Cardiovascular: Normal rate and rhythm  GI:     No gross discoloration. Bowel sounds present, no audible bruits. Soft,  no guarding,   + Mild general abdominal tenderness, no rebound, no palpable pulsatile masses   No McBurney's point tenderness   Negative Rovsing sign    Negative Sands's sign. Back:   No CVA tenderness to percussion.   Musculoskeletal:  No edema, no deformity  Integument: No rash, dry skin  Neurologic:  Alert & oriented, normal speech  Psychiatric: Cooperative, pleasant affect       LABS:  Results for orders placed or performed during the hospital encounter of 04/03/22   COVID-19, Rapid    Specimen: Nasopharyngeal   Result Value Ref Range    Source THROAT     SARS-CoV-2, NAAT NOT DETECTED NOT DETECTED   CBC with Auto Differential   Result Value Ref Range    WBC 12.9 (H) 4.0 - 10.5 K/CU MM    RBC 4.41 4.2 - 5.4 M/CU MM    Hemoglobin 13.7 12.5 - 16.0 GM/DL    Hematocrit 42.6 37 - 47 %    MCV 96.6 78 - 100 FL    MCH 31.1 (H) 27 - 31 PG    MCHC 32.2 32.0 - 36.0 %    RDW 13.7 11.7 - 14.9 %    Platelets 323 355 - 424 K/CU MM    MPV 11.0 7.5 - 11.1 FL    Differential Type AUTOMATED DIFFERENTIAL     Segs Relative 84.6 (H) 36 - 66 %    Lymphocytes % 9.3 (L) 24 - 44 %    Monocytes % 5.4 (H) 0 - 4 %    Eosinophils % 0.0 0 - 3 %    Basophils % 0.2 0 - 1 %    Segs Absolute 10.9 K/CU MM    Lymphocytes Absolute 1.2 K/CU MM    Monocytes Absolute 0.7 K/CU MM    Eosinophils Absolute 0.0 K/CU MM    Basophils Absolute 0.0 K/CU MM    Nucleated RBC % 0.0 %    Total Nucleated RBC 0.0 K/CU MM    Total Immature Neutrophil 0.06 K/CU MM    Immature Neutrophil % 0.5 (H) 0 - 0.43 %   Comprehensive Metabolic Panel   Result Value Ref Range    Sodium 141 135 - 145 MMOL/L    Potassium 3.3 (L) 3.5 - 5.1 MMOL/L    Chloride 103 99 - 110 mMol/L    CO2 23 21 - 32 MMOL/L    BUN 11 6 - 23 MG/DL    CREATININE 0.7 0.6 - 1.1 MG/DL    Glucose 101 (H) 70 - 99 MG/DL    Calcium 8.9 8.3 - 10.6 MG/DL    Albumin 4.7 3.4 - 5.0 GM/DL    Total Protein 7.1 6.4 - 8.2 GM/DL    Total Bilirubin 0.8 0.0 - 1.0 MG/DL    ALT 22 10 - 40 U/L    AST 21 15 - 37 IU/L    Alkaline Phosphatase 84 40 - 129 IU/L    GFR Non-African American >60 >60 mL/min/1.73m2    GFR African American >60 >60 mL/min/1.73m2    Anion Gap 15 4 - 16   Lipase   Result Value Ref Range    Lipase 118 (H) 13 - 60 IU/L   Urinalysis   Result Value Ref Range    Color, UA YELLOW YELLOW    Clarity, UA CLEAR CLEAR    Glucose, Urine NEGATIVE NEGATIVE MG/DL    Bilirubin Urine NEGATIVE NEGATIVE MG/DL    Ketones, Urine 40 (A) NEGATIVE MG/DL    Specific Gravity, UA 1.010 1.001 - 1.035    Blood, Urine NEGATIVE NEGATIVE    pH, Urine 8.5 (HH) 5.0 - 8.0    Protein, UA 30 (A) NEGATIVE MG/DL    Urobilinogen, Urine 0.2 0.2 - 1.0 MG/DL    Nitrite Urine, Quantitative NEGATIVE NEGATIVE    Leukocyte Esterase, Urine NEGATIVE NEGATIVE    RBC, UA NONE SEEN 0 - 6 /HPF    WBC, UA 1 0 - 5 /HPF    Bacteria, UA NEGATIVE NEGATIVE /HPF    Squam Epithel, UA 2 /HPF    Mucus, UA RARE (A) NEGATIVE HPF    Trichomonas, UA NONE SEEN NONE SEEN /HPF   HCG Serum, Qualitative   Result Value Ref Range    hCG Qual NEGATIVE    Magnesium   Result Value Ref Range    Magnesium 1.9 1.8 - 2.4 mg/dl         ED COURSE & MEDICAL DECISION MAKING      Patient presents as above. Patient provided IV fluids, Phenergan. Bentyl as ordered. CBC shows white blood cell count of 12.9. CMP shows potassium of 3.3. Rapid Covid is negative. Base is 118.   Urinalysis with ketones with negative leukocytes nitrites and bacteria. Pregnancy is negative. Magnesium is 1.9. Patient is having continued nausea vomiting. Patient provided Zofran and Ativan. Review of previous visits shows that patient was evaluated at [facility] 2 times already today and symptoms have continued. Patient had negative CT scan this afternoon. I do not believe patient requires repeat CT at this time. Patient has required multiple antiemetic treatments, has failed outpatient treatment. I believe she requires admission for further evaluation and treatment. Replacement of potassium is ordered, additional IV fluids. Capsaicin ointment is ordered. Consult hospitalist who accepts admission. Clinical  IMPRESSION    1. Intractable vomiting with nausea, unspecified vomiting type    2. Generalized abdominal pain    3. Elevated lipase    4. Hypokalemia        Patient admitted    Comment: Please note this report has been produced using speech recognition software and may contain errors related to that system including errors in grammar, punctuation, and spelling, as well as words and phrases that may be inappropriate. If there are any questions or concerns please feel free to contact the dictating provider for clarification.       Sheldon Stoner PA-C  04/03/22 0163

## 2022-04-04 NOTE — H&P
History and Physical      Name:  Kartik Sánchez /Age/Sex: 1995  (32 y.o. female)   MRN & CSN:  [id number] & [id number] Encounter Date/Time: 4/3/2022 11:19 PM EDT   Location:  ED28/ED-28 PCP: No primary care provider on file. Hospital Day: 1    Assessment and Plan:     #. Intractable nausea, vomiting  -Suspected hyperemesis syndrome secondary to marijuana.  -Patient admits to using marijuana 2 weeks ago. -Patient was admitted to [facility] (-2022) for similar complaints, and was seen in [facility] ED twice today. -CT abdomen/pelvis-done at [facility] acute abnormality. #.  Mild hypokalemia  -Potassium supplementation ordered in ER  -Continue IV fluids with potassium    #. Leukocytosis-possibly reactive  -Trending down when compared to labs from care everywhere. #.  Bipolar disorder  -Diagnosed recently during admission to [address] [place] [address] prescribed valproic acid.  -Patient does not take any medications at home. #.  COVID-. Disposition:   Current Living situation: home    Diet  clear liquid diet   DVT Prophylaxis [x] Lovenox, []  Heparin, [] SCDs, [] Ambulation,  [] Eliquis, [] Xarelto   Code Status Prior   Surrogate Decision Maker/ POA      History from:   EMR, patient. History of Present Illness:     Chief Complaint: Intractable nausea and vomiting  Kartik Sánchez is a 32 y.o. female with bipolar disorder as per care everywhere presented to ED with complaints of nausea, vomiting. Patient reported having symptoms for the last 2 days. Patient denied any abdominal pain, denied any fever, but complains of chills, denied any chest pain, shortness of breath, denied any cough, denied any urinary complaints, admits to having constipation. Could not recall when her last bowel movement was. At presentation patient was noted to have /78, HR 81, RR 20, temp 97.5, saturating 100% on room air. Lab work significant for potassium 3.3, lipase 118, WBC 12.9.   Rapid Covid negative. UA-not suggestive of infection. Patient received IV fluids, IV Ativan, Zofran, Phenergan in ED. Review of Systems: Need 10 Elements   10 point review of systems conducted and pertinent positives and negatives as per HPI. Objective:   No intake or output data in the 24 hours ending 04/03/22 2319   Vitals:   Vitals:    04/03/22 2031 04/03/22 2037   BP:  126/78   Pulse:  81   Resp:  20   Temp: 97.5 °F (36.4 °C)    TempSrc: Oral    SpO2:  100%       Medications Prior to Admission   Reviewed medications with patient    Prior to Admission medications    Medication Sig Start Date End Date Taking? Authorizing Provider   divalproex (DEPAKOTE ER) 250 MG extended release tablet Take 750 mg by mouth 2 times daily 2/16/22   Historical Provider, MD   ondansetron (ZOFRAN) 4 MG tablet Take 1 tablet by mouth every 8 hours as needed for Nausea 2/8/22   Thi Francisco PA-C   Pren-Fe-Meth-FA-Omeg w/o A (PRENATE ESSENTIAL) 29-0.6-0.4-340 MG CAPS Take 1 capsule by mouth daily  Patient not taking: Reported on 2/21/2022    Historical Provider, MD   ondansetron (ZOFRAN-ODT) 4 MG disintegrating tablet Take 4 mg by mouth every 6 hours 5/4/20   Historical Provider, MD       Physical Exam: Need 8 Elements   Physical Exam     GEN  -sleeping, easily arousable   EYES   -PERRL. HENT  -MM are moist.  RESP  -LS CTA equal bilat, no wheezes, rales or rhonchi. Symmetric chest movement. No respiratory distress noted. C/V  -S1/S2 auscultated. RRR without appreciable M/R/G. No peripheral edema. No reproducible chest wall tenderness. GI  -Abdomen is soft, non-distended, no significant tenderness. No masses or guarding. + BS in all quadrants. Rectal exam deferred.   -No CVA tenderness. Carter catheter is not present. MS  -B/L extremities strong muscles strength. Full movements. No gross joint deformities. No swelling, intact sensation symmetrical.   SKIN  -Normal coloration, warm, dry.    NEURO  - Awake, alert, oriented x 3, no focal deficits. PSYC  - Appropriate affect. Past Medical History:   Reviewed patient's past medical, surgical, social, family history and allergies. PMHx   Past Medical History:   Diagnosis Date    Patient denies medical problems      PSHX:  has a past surgical history that includes Tonsillectomy. Allergies: No Known Allergies  Fam HX: family history is not on file. Soc HX:   Social History     Socioeconomic History    Marital status:      Spouse name: Not on file    Number of children: Not on file    Years of education: Not on file    Highest education level: Not on file   Occupational History    Not on file   Tobacco Use    Smoking status: Current Every Day Smoker     Packs/day: 0.50     Years: 1.00     Pack years: 0.50    Smokeless tobacco: Never Used   Substance and Sexual Activity    Alcohol use: No    Drug use: Yes     Types: Marijuana [place])     Comment: occ    Sexual activity: Yes   Other Topics Concern    Not on file   Social History Narrative    Not on file     Social Determinants of Health     Financial Resource Strain:     Difficulty of Paying Living Expenses: Not on file   Food Insecurity:     Worried About Running Out of Food in the Last Year: Not on file    Richard of Food in the Last Year: Not on file   Transportation Needs:     Lack of Transportation (Medical): Not on file    Lack of Transportation (Non-Medical):  Not on file   Physical Activity:     Days of Exercise per Week: Not on file    Minutes of Exercise per Session: Not on file   Stress:     Feeling of Stress : Not on file   Social Connections:     Frequency of Communication with Friends and Family: Not on file    Frequency of Social Gatherings with Friends and Family: Not on file    Attends Confucianism Services: Not on file    Active Member of Clubs or Organizations: Not on file    Attends Club or Organization Meetings: Not on file    Marital Status: Not on file   Intimate Partner Violence:  Fear of Current or Ex-Partner: Not on file    Emotionally Abused: Not on file    Physically Abused: Not on file    Sexually Abused: Not on file   Housing Stability:     Unable to Pay for Housing in the Last Year: Not on file    Number of Places Lived in the Last Year: Not on file    Unstable Housing in the Last Year: Not on file       Medications:   Medications:    dicyclomine  20 mg Oral Once    sodium chloride  1,000 mL IntraVENous Once    potassium chloride  10 mEq IntraVENous Once    capsaicin   Topical Once      Infusions:   PRN Meds:      Labs      CBC:   Recent Labs     04/03/22 2119   WBC 12.9*   HGB 13.7        BMP:    Recent Labs     04/03/22 2119      K 3.3*      CO2 23   BUN 11   CREATININE 0.7   GLUCOSE 101*     Hepatic:   Recent Labs     04/03/22 2119   AST 21   ALT 22   BILITOT 0.8   ALKPHOS 84     Lipids: No results found for: CHOL, HDL, TRIG  Hemoglobin A1C: No results found for: LABA1C  TSH: No results found for: TSH  Troponin:   Lab Results   Component Value Date    TROPONINT <0.010 04/21/2020    TROPONINT <0.010 04/21/2020     Lactic Acid: No results for input(s): LACTA in the last 72 hours. BNP: No results for input(s): PROBNP in the last 72 hours.   UA:  Lab Results   Component Value Date    NITRU NEGATIVE 04/03/2022    NITRU Negative 08/03/2013    COLORU YELLOW 04/03/2022    PHUR 6.0 08/03/2013    WBCUA 1 04/03/2022    RBCUA NONE SEEN 04/03/2022    MUCUS RARE 04/03/2022    TRICHOMONAS NONE SEEN 04/03/2022    BACTERIA NEGATIVE 04/03/2022    CLARITYU CLEAR 04/03/2022    SPECGRAV 1.010 04/03/2022    LEUKOCYTESUR NEGATIVE 04/03/2022    UROBILINOGEN 0.2 04/03/2022    BILIRUBINUR NEGATIVE 04/03/2022    BLOODU NEGATIVE 04/03/2022    GLUCOSEU Negative 08/03/2013    KETUA 40 04/03/2022     Urine Cultures: No results found for: LABURIN  Blood Cultures: No results found for: BC  No results found for: BLOODCULT2  Organism: No results found for: ORG    Imaging/Diagnostics Last 24 Hours   No results found. Personally reviewed Lab Studies, Imaging, and discussed case with ED provider.     Electronically signed by Danielle Collins MD on 4/3/2022 at 11:19 PM

## 2022-04-04 NOTE — PROGRESS NOTES
Patient is up walking around room, unhooked self from monitor and trying to unhook IV when this nurse walked in. Explained to patient that she needs to leave IV hooked up so that we can get needed fluids infused and the importance of staying on tele. . This nurse got portable monitor for tele so patient could move around room and stay on monitor.

## 2022-04-05 VITALS
BODY MASS INDEX: 26.12 KG/M2 | RESPIRATION RATE: 16 BRPM | SYSTOLIC BLOOD PRESSURE: 145 MMHG | WEIGHT: 176.37 LBS | HEART RATE: 58 BPM | TEMPERATURE: 97.4 F | OXYGEN SATURATION: 100 % | DIASTOLIC BLOOD PRESSURE: 87 MMHG | HEIGHT: 69 IN

## 2022-04-05 PROBLEM — E87.6 HYPOKALEMIA: Status: ACTIVE | Noted: 2022-04-05

## 2022-04-05 PROCEDURE — 96376 TX/PRO/DX INJ SAME DRUG ADON: CPT

## 2022-04-05 PROCEDURE — 6370000000 HC RX 637 (ALT 250 FOR IP): Performed by: INTERNAL MEDICINE

## 2022-04-05 PROCEDURE — G0378 HOSPITAL OBSERVATION PER HR: HCPCS

## 2022-04-05 PROCEDURE — 96361 HYDRATE IV INFUSION ADD-ON: CPT

## 2022-04-05 PROCEDURE — 6360000002 HC RX W HCPCS: Performed by: INTERNAL MEDICINE

## 2022-04-05 RX ORDER — FAMOTIDINE 20 MG/1
20 TABLET, FILM COATED ORAL 2 TIMES DAILY
Qty: 60 TABLET | Refills: 0 | Status: SHIPPED | OUTPATIENT
Start: 2022-04-05 | End: 2022-06-08

## 2022-04-05 RX ADMIN — ONDANSETRON 4 MG: 4 TABLET, ORALLY DISINTEGRATING ORAL at 06:41

## 2022-04-05 RX ADMIN — PROCHLORPERAZINE EDISYLATE 10 MG: 5 INJECTION INTRAMUSCULAR; INTRAVENOUS at 09:29

## 2022-04-05 ASSESSMENT — PAIN SCALES - GENERAL: PAINLEVEL_OUTOF10: 7

## 2022-04-05 NOTE — PROGRESS NOTES
Comprehensive Nutrition Assessment    Type and Reason for Visit:  Initial,Positive Nutrition Screen (weight loss)    Nutrition Recommendations/Plan:   Continue full liquid diet advancing as patient able to tolerate  Will continue to follow up to monitor po intake, diet progression     Nutrition Assessment:  Admit with abdomen pain, intractable nausea/vomiting. Currently on full liquid diet but patient reports unable to keep anything down so far today. Getting up to ambulate around unit. Some weight loss noted in hx but not yet significant. Will follow at moderate nutrition risk at this time. Malnutrition Assessment:  Malnutrition Status:  No malnutrition    Context:  Acute Illness       Estimated Daily Nutrient Needs:  Energy (kcal):  0248-6413 (22-25 yunior/kg); Weight Used for Energy Requirements:  Current     Protein (g):  66-79 (1-1.2 g/kg); Weight Used for Protein Requirements:  Ideal        Fluid (ml/day):  2000; Method Used for Fluid Requirements:  1 ml/kcal      Nutrition Related Findings:  sitting up in bed, spitting up liquids, meds noted: zofran, bentyl      Wounds:  None       Current Nutrition Therapies:    ADULT DIET; Full Liquid    Anthropometric Measures:  · Height: 5' 9\" (175.3 cm)  · Current Body Weight: 176 lb 5.9 oz (80 kg)   · Usual Body Weight: 191 lb (86.6 kg) (per hx last October)     · Ideal Body Weight: 145 lbs; % Ideal Body Weight 121.6 %   · BMI: 26  · Adjusted Body Weight:  ; No Adjustment   · BMI Categories: Overweight (BMI 25.0-29. 9)       Nutrition Diagnosis:   · Predicted inadequate energy intake related to altered GI function as evidenced by nausea,vomiting,NPO or clear liquid status due to medical condition      Nutrition Interventions:   Food and/or Nutrient Delivery:  Continue Current Diet,Start Oral Nutrition Supplement  Nutrition Education/Counseling:  No recommendation at this time   Coordination of Nutrition Care:  Continue to monitor while inpatient    Goals:  Patient will tolerate diet to consume at least 50% at meals       Nutrition Monitoring and Evaluation:   Behavioral-Environmental Outcomes:  None Identified   Food/Nutrient Intake Outcomes:  Food and Nutrient Intake,Diet Advancement/Tolerance  Physical Signs/Symptoms Outcomes:  Biochemical Data,Nausea or Vomiting,Skin,Weight,Meal Time Behavior     Discharge Planning:     Too soon to determine     Electronically signed by Edson Grover RD, LD on 4/5/22 at 9:45 AM EDT    Contact: [phone]

## 2022-04-05 NOTE — PROGRESS NOTES
Patient was in room yesterday morning as well as this morning drinking all of her fluids on her tray along with her 350 ml cup of ice water. Zofran administered an hour ago. While at nurses station, could hear patient in room dry heaving with nothing coming up. Patient complained of feeling hot; temp 97.8. Patient asked to walk in hallway -- advised she was allowed -- currently walking laps.

## 2022-04-05 NOTE — DISCHARGE SUMMARY
Hospital Discharge Summary    Patient:  Gin Coker  YOB: 1995    MRN: [id number]   Acct: [de-identified]    Primary Care Physician: No primary care provider on file. Admit date:  4/3/2022    Discharge date:  4/5/202204/05/22      Discharge Diagnoses:   Intractable nausea and vomiting  Principal Problem:    Intractable nausea and vomiting  Active Problems:    Hypokalemia  Resolved Problems:    * No resolved hospital problems. *        Admitted for: (HPI):Intractable nausea and vomiting  Gin Coker is a 32 y.o. female with bipolar disorder as per care everywhere presented to ED with complaints of nausea, vomiting. Patient reported having symptoms for the last 2 days. Patient denied any abdominal pain, denied any fever, but complains of chills, denied any chest pain, shortness of breath, denied any cough, denied any urinary complaints, admits to having constipation. Could not recall when her last bowel movement was. At presentation patient was noted to have /78, HR 81, RR 20, temp 97.5, saturating 100% on room air. Lab work significant for potassium 3.3, lipase 118, WBC 12.9. Rapid Covid negative. UA-not suggestive of infection. Patient received IV fluids, IV Ativan, Zofran, Phenergan in ED. Hospital Course:  Pt was admitted and treated as follows    Intractable nausea, vomiting  -Suspected hyperemesis syndrome secondary to marijuana.  -Patient admits to admitting hospitalist that she used marijuana 2 weeks ago,however told me more than 4 weeks ago. .  -Patient was admitted to [facility] (2/12-2/16/2022) for similar complaints, and was seen in [facility] ED twice 04/03.   -CT abdomen/pelvis-done at [facility] acute abnormality.  -Treated with  iv fluid,antiemetics,pepcid and iv potassium. She was started on clears and advanced as tolerated. She refused to advance stating she is unable to keep down food. Nurseing staff however reports she has been drinking all her fluids without issue.   -She will be discharged and will follow with PCP as out pt. She said she has antiemetics at home.     #.  Mild hypokalemia  -Potassium supplementation done. -Magnesium good at 1.9     #.  Leukocytosis-possibly reactive  -Trending down when compared to labs from care everywhere. #.  Bipolar disorder  -Diagnosed recently during admission to [address] [place] [address] prescribed valproic acid.  -Patient does not take any medications at home.     #.  COVID-19-2/7/2022.     Disposition; Home. She called significant other on video call while I was seeing her and told him to come get her .     Consultants: None    Discharge Medications:       Medication List      START taking these medications    famotidine 20 MG tablet  Commonly known as: PEPCID  Take 1 tablet by mouth 2 times daily        CONTINUE taking these medications    divalproex 250 MG extended release tablet  Commonly known as: DEPAKOTE ER     ondansetron 4 MG disintegrating tablet  Commonly known as: ZOFRAN-ODT     ondansetron 4 MG tablet  Commonly known as: Zofran  Take 1 tablet by mouth every 8 hours as needed for Nausea     Prenate Essential 29-0.6-0.4-340 MG Caps           Where to Get Your Medications      You can get these medications from any pharmacy    Bring a paper prescription for each of these medications  · famotidine 20 MG tablet       Physical Exam:    Vitals:  Vitals:    04/04/22 2030 04/05/22 0730 04/05/22 0918 04/05/22 0926   BP: 132/85  (!) 145/87    Pulse: 70  58    Resp: 14  16    Temp: 98.8 °F (37.1 °C) 97.8 °F (36.6 °C) 97.4 °F (36.3 °C)    TempSrc: Oral Oral Oral    SpO2: 100%      Weight:       Height:    5' 9\" (1.753 m)     Weight: Weight: 176 lb 5.9 oz (80 kg)     24 hour intake/output:No intake or output data in the 24 hours ending 04/05/22 1031    General appearance - alert, well appearing, and in no distress  Chest - clear to auscultation, no wheezes, rales or rhonchi, symmetric air entry  Heart - normal rate, regular rhythm, normal S1, S2, no murmurs, rubs, clicks or gallops  Abdomen - soft, nontender, nondistended, no masses or organomegaly  Obese: No; Protuberant: No   Neurological - alert, oriented, normal speech, no focal findings or movement disorder noted  Extremities - peripheral pulses normal, no pedal edema, no clubbing or cyanosis  Skin - normal coloration and turgor, no rashes, no suspicious skin lesions noted    Procedures:      Diagnostic Test:      Radiology reports as per the Radiologist  Radiology: No results found.      Results for orders placed or performed during the hospital encounter of 04/03/22   COVID-19, Rapid    Specimen: Nasopharyngeal   Result Value Ref Range    Source THROAT     SARS-CoV-2, NAAT NOT DETECTED NOT DETECTED   CBC with Auto Differential   Result Value Ref Range    WBC 12.9 (H) 4.0 - 10.5 K/CU MM    RBC 4.41 4.2 - 5.4 M/CU MM    Hemoglobin 13.7 12.5 - 16.0 GM/DL    Hematocrit 42.6 37 - 47 %    MCV 96.6 78 - 100 FL    MCH 31.1 (H) 27 - 31 PG    MCHC 32.2 32.0 - 36.0 %    RDW 13.7 11.7 - 14.9 %    Platelets 080 867 - 210 K/CU MM    MPV 11.0 7.5 - 11.1 FL    Differential Type AUTOMATED DIFFERENTIAL     Segs Relative 84.6 (H) 36 - 66 %    Lymphocytes % 9.3 (L) 24 - 44 %    Monocytes % 5.4 (H) 0 - 4 %    Eosinophils % 0.0 0 - 3 %    Basophils % 0.2 0 - 1 %    Segs Absolute 10.9 K/CU MM    Lymphocytes Absolute 1.2 K/CU MM    Monocytes Absolute 0.7 K/CU MM    Eosinophils Absolute 0.0 K/CU MM    Basophils Absolute 0.0 K/CU MM    Nucleated RBC % 0.0 %    Total Nucleated RBC 0.0 K/CU MM    Total Immature Neutrophil 0.06 K/CU MM    Immature Neutrophil % 0.5 (H) 0 - 0.43 %   Comprehensive Metabolic Panel   Result Value Ref Range    Sodium 141 135 - 145 MMOL/L    Potassium 3.3 (L) 3.5 - 5.1 MMOL/L    Chloride 103 99 - 110 mMol/L    CO2 23 21 - 32 MMOL/L    BUN 11 6 - 23 MG/DL    CREATININE 0.7 0.6 - 1.1 MG/DL    Glucose 101 (H) 70 - 99 MG/DL    Calcium 8.9 8.3 - 10.6 MG/DL    Albumin 4.7 3.4 - 5.0 GM/DL Total Protein 7.1 6.4 - 8.2 GM/DL    Total Bilirubin 0.8 0.0 - 1.0 MG/DL    ALT 22 10 - 40 U/L    AST 21 15 - 37 IU/L    Alkaline Phosphatase 84 40 - 129 IU/L    GFR Non-African American >60 >60 mL/min/1.73m2    GFR African American >60 >60 mL/min/1.73m2    Anion Gap 15 4 - 16   Lipase   Result Value Ref Range    Lipase 118 (H) 13 - 60 IU/L   Urinalysis   Result Value Ref Range    Color, UA YELLOW YELLOW    Clarity, UA CLEAR CLEAR    Glucose, Urine NEGATIVE NEGATIVE MG/DL    Bilirubin Urine NEGATIVE NEGATIVE MG/DL    Ketones, Urine 40 (A) NEGATIVE MG/DL    Specific Gravity, UA 1.010 1.001 - 1.035    Blood, Urine NEGATIVE NEGATIVE    pH, Urine 8.5 (HH) 5.0 - 8.0    Protein, UA 30 (A) NEGATIVE MG/DL    Urobilinogen, Urine 0.2 0.2 - 1.0 MG/DL    Nitrite Urine, Quantitative NEGATIVE NEGATIVE    Leukocyte Esterase, Urine NEGATIVE NEGATIVE    RBC, UA NONE SEEN 0 - 6 /HPF    WBC, UA 1 0 - 5 /HPF    Bacteria, UA NEGATIVE NEGATIVE /HPF    Squam Epithel, UA 2 /HPF    Mucus, UA RARE (A) NEGATIVE HPF    Trichomonas, UA NONE SEEN NONE SEEN /HPF   HCG Serum, Qualitative   Result Value Ref Range    hCG Qual NEGATIVE    Magnesium   Result Value Ref Range    Magnesium 1.9 1.8 - 2.4 mg/dl       Diet:  ADULT DIET;  Full Liquid    Activity:  Up ad umberto (Patient can move independently)    Follow-up:  in 1 weeks with No primary care provider on file.,    Disposition: home    Condition: Stable      Time Spent: 25 minutes    Electronically signed by Eri Amador MD on 4/5/2022 at 10:31 AM    Discharging Hospitalist

## 2022-04-05 NOTE — DISCHARGE INSTR - COC
Continuity of Care Form    Patient Name: Delphia Spurling   :  1995  MRN:  [id number]    Admit date:  4/3/2022  Discharge date:  ***    Code Status Order: Full Code   Advance Directives:      Admitting Physician:  Deni Gonzalez MD  PCP: No primary care provider on file. Discharging Nurse: [facility] Unit/Room#: 1116/1116-A  Discharging Unit Phone Number: ***    Emergency Contact:   Extended Emergency Contact Information  Primary Emergency Contact: MiguelGuilherme  Address: [facility], [address] [place] [address] Phone: [phone]  Mobile Phone: [phone]  Relation: Other    Past Surgical History:  Past Surgical History:   Procedure Laterality Date    TONSILLECTOMY         Immunization History: There is no immunization history on file for this patient.     Active Problems:  Patient Active Problem List   Diagnosis Code    Encounter for IUD removal Z30.432    Nausea and vomiting R11.2    Vomiting R11.10    Hyperemesis gravidarum O21.0    Intractable nausea and vomiting R11.2       Isolation/Infection:   Isolation            No Isolation          Patient Infection Status       Infection Onset Added Last Indicated Last Indicated By Review Planned Expiration Resolved Resolved By    None active    Resolved    COVID-19 (Rule Out) 22 COVID-19, Rapid (Ordered)   22 Rule-Out Test Resulted            Nurse Assessment:  Last Vital Signs: BP (!) 145/87   Pulse 58   Temp 97.4 °F (36.3 °C) (Oral)   Resp 16   Ht 5' 9\" (1.753 m)   Wt 176 lb 5.9 oz (80 kg)   SpO2 100%   BMI 26.05 kg/m²     Last documented pain score (0-10 scale): Pain Level: 7  Last Weight:   Wt Readings from Last 1 Encounters:   22 176 lb 5.9 oz (80 kg)     Mental Status:  {IP PT MENTAL STATUS:06580}    IV Access:  {[facility] IV ACCESS:287024793}    Nursing Mobility/ADLs:  Walking   {[facility] VSXZ:916594290}  Transfer  {[facility] DME SHP}  Bathing  {[facility] DME URNE:738514653}  Dressing  {[facility] DME AKZK:544433497}  Toileting  {[facility] DME GYUP:582988031}  Feeding  {[facility] DME NSPQ:303927748}  Med Admin  {[facility] DME IPHF:875382541}  Med Delivery   { [facility] MED Delivery:456659357}    Wound Care Documentation and Therapy:        Elimination:  Continence: Bowel: {YES / QQ:06931}  Bladder: {YES / UA:20917}  Urinary Catheter: {Urinary Catheter:278701940}   Colostomy/Ileostomy/Ileal Conduit: {YES / CR:92969}       Date of Last BM: ***  No intake or output data in the 24 hours ending 22 1022  I/O last 3 completed shifts:   In: 1000 [IV Piggyback:1000]  Out: -     Safety Concerns:     [employer] Safety Concerns:360471322}    Impairments/Disabilities:      [employer] Impairments/Disabilities:615635660}    Nutrition Therapy:  Current Nutrition Therapy:   [employer] Diet List:334488062}    Routes of Feeding: {[facility] DME Other Feedings:219422064}  Liquids: {Slp liquid thickness:22118}  Daily Fluid Restriction: {[facility] DME Yes amt example:074169394}  Last Modified Barium Swallow with Video (Video Swallowing Test): {Done Not Done OJVV:864366139}    Treatments at the Time of Hospital Discharge:   Respiratory Treatments: ***  Oxygen Therapy:  {Therapy; copd oxygen:04976}  Ventilator:    { CC Vent BKZS:891815669}    Rehab Therapies: {THERAPEUTIC INTERVENTION:2605949071}  Weight Bearing Status/Restrictions: [employer] Weight Bearin}  Other Medical Equipment (for information only, NOT a DME order):  {EQUIPMENT:933272227}  Other Treatments: ***    Patient's personal belongings (please select all that are sent with patient):  {P DME Belongings:706644713}    RN SIGNATURE:  {Esignature:326395370}    CASE MANAGEMENT/SOCIAL WORK SECTION    Inpatient Status Date: ***    Readmission Risk Assessment Score:  Readmission Risk              Risk of Unplanned Readmission:  0           Discharging to Facility/ Agency   Name:   Address:  Phone:  Fax:    Dialysis Facility (if applicable)   Name:  Address:  Dialysis Schedule:  Phone:  Fax:    / signature: {Esignature:265939034}    PHYSICIAN SECTION    Prognosis: {Prognosis:1938201686}    Condition at Discharge: 50Amb[address] Clark Patient Condition:114467400}    Rehab Potential (if transferring to Rehab): {Prognosis:1906080798}    Recommended Labs or Other Treatments After Discharge: ***    Physician Certification: I certify the above information and transfer of Marisabel Chandra  is necessary for the continuing treatment of the diagnosis listed and that she requires {Admit to Appropriate Level of Care:55029} for {GREATER/LESS:621329783} 30 days.      Update Admission H&P: {[facility] DME Changes in WWABQ:351084941}    PHYSICIAN SIGNATURE:  {Esignature:995962766}

## 2022-04-05 NOTE — PLAN OF CARE
Nutrition Problem #1: Predicted inadequate energy intake  Intervention: Food and/or Nutrient Delivery: Continue Current Diet,Start Oral Nutrition Supplement  Nutritional Goals: Patient will tolerate diet to consume at least 50% at meals

## 2022-04-07 ENCOUNTER — HOSPITAL ENCOUNTER (EMERGENCY)
Age: 27
Discharge: HOME | End: 2022-04-07
Attending: EMERGENCY MEDICINE
Payer: MEDICARE

## 2022-04-07 VITALS
OXYGEN SATURATION: 97 % | WEIGHT: 165 LBS | HEIGHT: 69 IN | HEART RATE: 56 BPM | BODY MASS INDEX: 24.44 KG/M2 | RESPIRATION RATE: 16 BRPM | TEMPERATURE: 98.2 F | DIASTOLIC BLOOD PRESSURE: 65 MMHG | SYSTOLIC BLOOD PRESSURE: 123 MMHG

## 2022-04-07 DIAGNOSIS — R11.2 NON-INTRACTABLE VOMITING WITH NAUSEA, UNSPECIFIED VOMITING TYPE: Primary | ICD-10-CM

## 2022-04-07 DIAGNOSIS — F12.20 MODERATE TETRAHYDROCANNABINOL (THC) DEPENDENCE (HCC): ICD-10-CM

## 2022-04-07 LAB
ALBUMIN SERPL-MCNC: 5 GM/DL (ref 3.4–5)
ALP BLD-CCNC: 86 IU/L (ref 40–129)
ALT SERPL-CCNC: 22 U/L (ref 10–40)
AMPHETAMINES: NEGATIVE
ANION GAP SERPL CALCULATED.3IONS-SCNC: 17 MMOL/L (ref 4–16)
AST SERPL-CCNC: 16 IU/L (ref 15–37)
BACTERIA: ABNORMAL /HPF
BARBITURATE SCREEN URINE: NEGATIVE
BASOPHILS ABSOLUTE: 0 K/CU MM
BASOPHILS RELATIVE PERCENT: 0.2 % (ref 0–1)
BENZODIAZEPINE SCREEN, URINE: NEGATIVE
BILIRUB SERPL-MCNC: 0.9 MG/DL (ref 0–1)
BILIRUBIN URINE: NEGATIVE MG/DL
BLOOD, URINE: NEGATIVE
BUN BLDV-MCNC: 10 MG/DL (ref 6–23)
CALCIUM SERPL-MCNC: 9.6 MG/DL (ref 8.3–10.6)
CANNABINOID SCREEN URINE: ABNORMAL
CAST TYPE: ABNORMAL /HPF
CHLORIDE BLD-SCNC: 100 MMOL/L (ref 99–110)
CLARITY: ABNORMAL
CO2: 21 MMOL/L (ref 21–32)
COCAINE METABOLITE: NEGATIVE
COLOR: YELLOW
CREAT SERPL-MCNC: 0.8 MG/DL (ref 0.6–1.1)
CRYSTAL TYPE: ABNORMAL /HPF
DIFFERENTIAL TYPE: ABNORMAL
EOSINOPHILS ABSOLUTE: 0 K/CU MM
EOSINOPHILS RELATIVE PERCENT: 0.1 % (ref 0–3)
EPITHELIAL CELLS, UA: 3 /HPF
GFR AFRICAN AMERICAN: >60 ML/MIN/1.73M2
GFR NON-AFRICAN AMERICAN: >60 ML/MIN/1.73M2
GLUCOSE BLD-MCNC: 110 MG/DL (ref 70–99)
GLUCOSE, URINE: NEGATIVE MG/DL
HCG QUALITATIVE: NEGATIVE
HCT VFR BLD CALC: 47.5 % (ref 37–47)
HEMOGLOBIN: 15.9 GM/DL (ref 12.5–16)
IMMATURE NEUTROPHIL %: 0.2 % (ref 0–0.43)
KETONES, URINE: 15 MG/DL
LEUKOCYTE ESTERASE, URINE: NEGATIVE
LIPASE: 36 IU/L (ref 13–60)
LYMPHOCYTES ABSOLUTE: 1.8 K/CU MM
LYMPHOCYTES RELATIVE PERCENT: 17.9 % (ref 24–44)
MCH RBC QN AUTO: 30.3 PG (ref 27–31)
MCHC RBC AUTO-ENTMCNC: 33.5 % (ref 32–36)
MCV RBC AUTO: 90.6 FL (ref 78–100)
MONOCYTES ABSOLUTE: 0.6 K/CU MM
MONOCYTES RELATIVE PERCENT: 6.5 % (ref 0–4)
NITRITE URINE, QUANTITATIVE: NEGATIVE
OPIATES, URINE: NEGATIVE
OXYCODONE: NEGATIVE
PDW BLD-RTO: 13.2 % (ref 11.7–14.9)
PH, URINE: 8.5 (ref 5–8)
PHENCYCLIDINE, URINE: NEGATIVE
PLATELET # BLD: 306 K/CU MM (ref 140–440)
PMV BLD AUTO: 11.2 FL (ref 7.5–11.1)
POTASSIUM SERPL-SCNC: 3.2 MMOL/L (ref 3.5–5.1)
PROTEIN UA: NEGATIVE MG/DL
RBC # BLD: 5.24 M/CU MM (ref 4.2–5.4)
RBC URINE: 1 /HPF (ref 0–6)
SEGMENTED NEUTROPHILS ABSOLUTE COUNT: 7.5 K/CU MM
SEGMENTED NEUTROPHILS RELATIVE PERCENT: 75.1 % (ref 36–66)
SODIUM BLD-SCNC: 138 MMOL/L (ref 135–145)
SPECIFIC GRAVITY UA: 1.01 (ref 1–1.03)
TOTAL IMMATURE NEUTOROPHIL: 0.02 K/CU MM
TOTAL PROTEIN: 7.9 GM/DL (ref 6.4–8.2)
UROBILINOGEN, URINE: 0.2 MG/DL (ref 0.2–1)
WBC # BLD: 9.9 K/CU MM (ref 4–10.5)
WBC UA: <0 /HPF (ref 0–5)

## 2022-04-07 PROCEDURE — 80053 COMPREHEN METABOLIC PANEL: CPT

## 2022-04-07 PROCEDURE — 87086 URINE CULTURE/COLONY COUNT: CPT

## 2022-04-07 PROCEDURE — 2580000003 HC RX 258: Performed by: EMERGENCY MEDICINE

## 2022-04-07 PROCEDURE — 96365 THER/PROPH/DIAG IV INF INIT: CPT

## 2022-04-07 PROCEDURE — 99284 EMERGENCY DEPT VISIT MOD MDM: CPT

## 2022-04-07 PROCEDURE — 80307 DRUG TEST PRSMV CHEM ANLYZR: CPT

## 2022-04-07 PROCEDURE — 84703 CHORIONIC GONADOTROPIN ASSAY: CPT

## 2022-04-07 PROCEDURE — 6360000002 HC RX W HCPCS: Performed by: EMERGENCY MEDICINE

## 2022-04-07 PROCEDURE — 85025 COMPLETE CBC W/AUTO DIFF WBC: CPT

## 2022-04-07 PROCEDURE — 81001 URINALYSIS AUTO W/SCOPE: CPT

## 2022-04-07 PROCEDURE — 96375 TX/PRO/DX INJ NEW DRUG ADDON: CPT

## 2022-04-07 PROCEDURE — 96372 THER/PROPH/DIAG INJ SC/IM: CPT

## 2022-04-07 PROCEDURE — 83690 ASSAY OF LIPASE: CPT

## 2022-04-07 RX ORDER — DIPHENHYDRAMINE HYDROCHLORIDE 50 MG/ML
50 INJECTION INTRAMUSCULAR; INTRAVENOUS ONCE
Status: COMPLETED | OUTPATIENT
Start: 2022-04-07 | End: 2022-04-07

## 2022-04-07 RX ORDER — POTASSIUM CHLORIDE 7.45 MG/ML
10 INJECTION INTRAVENOUS ONCE
Status: COMPLETED | OUTPATIENT
Start: 2022-04-07 | End: 2022-04-07

## 2022-04-07 RX ORDER — HALOPERIDOL 5 MG/ML
5 INJECTION INTRAMUSCULAR ONCE
Status: COMPLETED | OUTPATIENT
Start: 2022-04-07 | End: 2022-04-07

## 2022-04-07 RX ORDER — 0.9 % SODIUM CHLORIDE 0.9 %
1000 INTRAVENOUS SOLUTION INTRAVENOUS ONCE
Status: COMPLETED | OUTPATIENT
Start: 2022-04-07 | End: 2022-04-07

## 2022-04-07 RX ORDER — 0.9 % SODIUM CHLORIDE 0.9 %
500 INTRAVENOUS SOLUTION INTRAVENOUS ONCE
Status: COMPLETED | OUTPATIENT
Start: 2022-04-07 | End: 2022-04-07

## 2022-04-07 RX ADMIN — HALOPERIDOL LACTATE 5 MG: 5 INJECTION, SOLUTION INTRAMUSCULAR at 10:55

## 2022-04-07 RX ADMIN — SODIUM CHLORIDE 1000 ML: 9 INJECTION, SOLUTION INTRAVENOUS at 10:52

## 2022-04-07 RX ADMIN — SODIUM CHLORIDE 500 ML: 9 INJECTION, SOLUTION INTRAVENOUS at 12:53

## 2022-04-07 RX ADMIN — DIPHENHYDRAMINE HYDROCHLORIDE 50 MG: 50 INJECTION INTRAMUSCULAR; INTRAVENOUS at 10:53

## 2022-04-07 RX ADMIN — POTASSIUM CHLORIDE 10 MEQ: 7.46 INJECTION, SOLUTION INTRAVENOUS at 12:09

## 2022-04-07 ASSESSMENT — PAIN DESCRIPTION - LOCATION: LOCATION: ABDOMEN

## 2022-04-07 ASSESSMENT — PAIN SCALES - GENERAL: PAINLEVEL_OUTOF10: 7

## 2022-04-07 ASSESSMENT — PAIN DESCRIPTION - PAIN TYPE: TYPE: ACUTE PAIN

## 2022-04-07 NOTE — ED NOTES
Reports nausea and vomiting for a week. Reports recent observation admission. Reports not feeling better. Reports abd pain and decreased appetite. Reports recent low K+.      Morgan Lauren RN  04/07/22 8511

## 2022-04-07 NOTE — ED PROVIDER NOTES
Triage Chief Complaint:   Abdominal Pain, Nausea (REPORTS EMESIS), and Chills    BEN Coker is a 32 y.o. female that presents with persistent nausea and vomiting. Patient reports very limited oral intake for the past week or so. Patient reports abdominal pain is diffuse, constant currently 7 out of 10. Some associated chills. Patient has been trying to drink vitamin water and is only able to keep a little bit down. No diarrhea but bowel movements have been soft. Patient has tried to keep medications down at home for her nausea but she has not been able to. Patient reports that her Phenergan suppositories are not working. Patient has been admitted twice this year for the same. Patient has had 2 prior CTs this year both of which were nonacute. No prior abdominal surgeries. No prior endoscopies. Patient never seen a gastroenterologist.    Of note, patient used to smoke marijuana but reports that she quit in February. Patient does still vape some. No alcohol use    ROS:  General:  No fevers, no chills, no weakness  Eyes:  No recent vison changes, no discharge  ENT:  No sore throat, no nasal congestion, no hearing changes  Cardiovascular:  No chest pain, no palpitations  Respiratory:  No shortness of breath, no cough, no wheezing  Gastrointestinal:  + pain, + nausea, + vomiting, no diarrhea  Musculoskeletal:  No muscle pain, no joint pain  Skin:  No rash, no pruritis, no easy bruising  Neurologic:  No speech problems, no headache, no extremity numbness, no extremity tingling, no extremity weakness  Psychiatric:  No anxiety  Genitourinary:  No dysuria, no hematuria  Endocrine:  No unexpected weight gain, no unexpected weight loss  Extremities:  no edema, no pain    Past Medical History:   Diagnosis Date    Patient denies medical problems      Past Surgical History:   Procedure Laterality Date    TONSILLECTOMY       History reviewed. No pertinent family history.   Social History Socioeconomic History    Marital status:      Spouse name: Not on file    Number of children: Not on file    Years of education: Not on file    Highest education level: Not on file   Occupational History    Not on file   Tobacco Use    Smoking status: Current Every Day Smoker     Packs/day: 0.50     Years: 1.00     Pack years: 0.50    Smokeless tobacco: Never Used   Substance and Sexual Activity    Alcohol use: No    Drug use: Yes     Types: Marijuana Elfida Dixon)     Comment: occ    Sexual activity: Yes   Other Topics Concern    Not on file   Social History Narrative    Not on file     Social Determinants of Health     Financial Resource Strain:     Difficulty of Paying Living Expenses: Not on file   Food Insecurity:     Worried About [address] in the Last Year: Not on file    Richard of Food in the Last Year: Not on file   Transportation Needs:     Lack of Transportation (Medical): Not on file    Lack of Transportation (Non-Medical): Not on file   Physical Activity:     Days of Exercise per Week: Not on file    Minutes of Exercise per Session: Not on file   Stress:     Feeling of Stress : Not on file   Social Connections:     Frequency of Communication with Friends and Family: Not on file    Frequency of Social Gatherings with Friends and Family: Not on file    Attends Yazdanism Services: Not on file    Active Member of Clubs or Organizations: Not on file    Attends Club or Organization Meetings: Not on file    Marital Status: Not on file   Intimate Partner Violence:     Fear of Current or Ex-Partner: Not on file    Emotionally Abused: Not on file    Physically Abused: Not on file    Sexually Abused: Not on file   Housing Stability:     Unable to Pay for Housing in the Last Year: Not on file    Number of [place] in the Last Year: Not on file    Unstable Housing in the Last Year: Not on file     No current facility-administered medications for this encounter.      Current Outpatient Medications Medication Sig Dispense Refill    Capsaicin 0.035 % LOTN Apply 5 g topically daily as needed (nausea) 1 each 0    famotidine (PEPCID) 20 MG tablet Take 1 tablet by mouth 2 times daily 60 tablet 0    divalproex (DEPAKOTE ER) 250 MG extended release tablet Take 750 mg by mouth 2 times daily (Patient not taking: Reported on 4/4/2022)      ondansetron (ZOFRAN) 4 MG tablet Take 1 tablet by mouth every 8 hours as needed for Nausea 10 tablet 0    Pren-Fe-Meth-FA-Omeg w/o A (PRENATE ESSENTIAL) 29-0.6-0.4-340 MG CAPS Take 1 capsule by mouth daily (Patient not taking: Reported on 2/21/2022)      ondansetron (ZOFRAN-ODT) 4 MG disintegrating tablet Take 4 mg by mouth every 6 hours       No Known Allergies    Nursing Notes Reviewed    Physical Exam:  ED Triage Vitals   Enc Vitals Group      BP 04/07/22 1034 (!) 149/88      Pulse 04/07/22 1031 72      Resp 04/07/22 1031 16      Temp 04/07/22 1031 98.2 °F (36.8 °C)      Temp src --       SpO2 04/07/22 1031 100 %      Weight 04/07/22 1031 165 lb (74.8 kg)      Height 04/07/22 1031 5' 9\" (1.753 m)      Head Circumference --       Peak Flow --       Pain Score --       Pain Loc --       Pain Edu? --       Excl. in [address]? --        My pulse ox interpretation is - normal    General appearance: Appears slightly nauseous. Skin:  Warm. Dry. Piloerection noted. Scattered contusions to bilateral forearms. Eye:  Extraocular movements intact. Ears, nose, mouth and throat:  Oral mucosa moist   Neck:  Trachea midline. Extremity:  No swelling. Normal ROM     Heart:  Regular rate and rhythm, normal S1 & S2, no extra heart sounds. Perfusion:  Intact   Respiratory:  Lungs clear to auscultation bilaterally. Respirations nonlabored. Abdominal:  Normal bowel sounds. Soft. Mild diffuse tenderness to palpation without any localized tenderness. No rebound or guarding. No point tenderness at McBurney's. Negative true Sands's. Non distended.   Back:  No CVA tenderness to palpation Neurological:  Alert and oriented times 3. No focal neuro deficits. Psychiatric: Slightly anxious.     I have reviewed and interpreted all of the currently available lab results from this visit (if applicable):  Results for orders placed or performed during the hospital encounter of 04/07/22   CBC with Auto Differential   Result Value Ref Range    WBC 9.9 4.0 - 10.5 K/CU MM    RBC 5.24 4.2 - 5.4 M/CU MM    Hemoglobin 15.9 12.5 - 16.0 GM/DL    Hematocrit 47.5 (H) 37 - 47 %    MCV 90.6 78 - 100 FL    MCH 30.3 27 - 31 PG    MCHC 33.5 32.0 - 36.0 %    RDW 13.2 11.7 - 14.9 %    Platelets 667 311 - 806 K/CU MM    MPV 11.2 (H) 7.5 - 11.1 FL    Differential Type AUTOMATED DIFFERENTIAL     Segs Relative 75.1 (H) 36 - 66 %    Lymphocytes % 17.9 (L) 24 - 44 %    Monocytes % 6.5 (H) 0 - 4 %    Eosinophils % 0.1 0 - 3 %    Basophils % 0.2 0 - 1 %    Segs Absolute 7.5 K/CU MM    Lymphocytes Absolute 1.8 K/CU MM    Monocytes Absolute 0.6 K/CU MM    Eosinophils Absolute 0.0 K/CU MM    Basophils Absolute 0.0 K/CU MM    Immature Neutrophil % 0.2 0 - 0.43 %    Total Immature Neutrophil 0.02 K/CU MM   Comprehensive Metabolic Panel   Result Value Ref Range    Sodium 138 135 - 145 MMOL/L    Potassium 3.2 (L) 3.5 - 5.1 MMOL/L    Chloride 100 99 - 110 mMol/L    CO2 21 21 - 32 MMOL/L    BUN 10 6 - 23 MG/DL    CREATININE 0.8 0.6 - 1.1 MG/DL    Glucose 110 (H) 70 - 99 MG/DL    Calcium 9.6 8.3 - 10.6 MG/DL    Albumin 5.0 3.4 - 5.0 GM/DL    Total Protein 7.9 6.4 - 8.2 GM/DL    Total Bilirubin 0.9 0.0 - 1.0 MG/DL    ALT 22 10 - 40 U/L    AST 16 15 - 37 IU/L    Alkaline Phosphatase 86 40 - 129 IU/L    GFR Non-African American >60 >60 mL/min/1.73m2    GFR African American >60 >60 mL/min/1.73m2    Anion Gap 17 (H) 4 - 16   Lipase   Result Value Ref Range    Lipase 36 13 - 60 IU/L   Urinalysis with Microscopic   Result Value Ref Range    Color, UA YELLOW YELLOW    Clarity, UA CLOUDY (A) CLEAR    Glucose, Urine NEGATIVE NEGATIVE MG/DL Bilirubin Urine NEGATIVE NEGATIVE MG/DL    Ketones, Urine 15 (A) NEGATIVE MG/DL    Specific Gravity, UA 1.015 1.001 - 1.035    Blood, Urine NEGATIVE NEGATIVE    pH, Urine 8.5 (HH) 5.0 - 8.0    Protein, UA NEGATIVE NEGATIVE MG/DL    Urobilinogen, Urine 0.2 0.2 - 1.0 MG/DL    Nitrite Urine, Quantitative NEGATIVE NEGATIVE    Leukocyte Esterase, Urine NEGATIVE NEGATIVE    RBC, UA 1 0 - 6 /HPF    WBC, UA <0 (L) 0 - 5 /HPF    Epithelial Cells, UA 3 /HPF    Cast Type NO CAST FORMS SEEN NO CAST FORMS SEEN /HPF    Bacteria, UA MANY (A) NEGATIVE /HPF    Crystal Type NONE SEEN (A) NEGATIVE /HPF   Drug screen multi urine   Result Value Ref Range    Cannabinoid Scrn, Ur UNCONFIRMED POSITIVE (A) NEGATIVE    Amphetamines NEGATIVE NEGATIVE    Cocaine Metabolite NEGATIVE NEGATIVE    Benzodiazepine Screen, Urine NEGATIVE NEGATIVE    Barbiturate Screen, Ur NEGATIVE NEGATIVE    Opiates, Urine NEGATIVE NEGATIVE    Phencyclidine, Urine NEGATIVE NEGATIVE    Oxycodone NEGATIVE NEGATIVE   HCG Serum, Qualitative   Result Value Ref Range    hCG Qual NEGATIVE       Radiographs (if obtained):  [] The following radiograph was interpreted by myself in the absence of a radiologist:   [] Radiologist's Report Reviewed:  No orders to display         EKG (if obtained): (All EKG's are interpreted by myself in the absence of a cardiologist)    Chart review shows recent radiographs:  No results found. MDM:  Pt presents as above. Emergent conditions considered. Presentation prompted initial labs as above. IVs established IV fluids, IV Benadryl and intramuscular Haldol are given. Chart is reviewed and I did note to prior negative CT scans this year. Additionally, patient's discharge summaries were reviewed from last 2 hospitalizations. CBC is without clinically significant derangement. CMP is with mild hypokalemia and patient is requesting IV potassium repletion which is given in addition to some additional fluid to help with any burning. hCG negative. Lipase is not suggestive of a pancreatitis. Urinalysis is with many bacteria but otherwise not suspicious for urinary tract infection and has 3 squames; suspect contamination. Urine will be sent for culture by we will hold antibiotics as patient is not with any urinary symptoms. Urine drug screen is positive for THC despite patient reporting abstaining from this drug for 2 months now. I do have suspicion for persistent THC use disorder resulting in a hyperemesis/cyclic vomiting type picture. Patient on reassessment does appear well and is tolerating her oral fluids of vitamin water without difficulty. No vomiting during ED course. Patient is appropriate for further outpatient follow-up and is referred to gastroenterology for further evaluation. I did encourage patient to call the gastroenterologist today to schedule an appointment for further testing. I discussed specific signs and symptoms on when to return to the emergency department as well as the need for close outpatient follow-up. Questions sought and answered with the patient. They voice understanding and agree with plan. Care of this patient occurred during the COVID-19 pandemic. Clinical Impression:  1. Non-intractable vomiting with nausea, unspecified vomiting type    2. Moderate tetrahydrocannabinol (THC) dependence ([address]      Disposition referral (if applicable):   Heike Brown MD  [place] [address]  [phone]    Schedule an appointment as soon as possible for a visit   GASTROENTEROLOGY    [facility] - ADULT  [address]  [phone]  Schedule an appointment as soon as possible for a visit   [address] PRIMARY CARE PROVIDER (SEE BELOW)    [facility] - [address]  [phone]  Today  If symptoms worsen    Disposition medications (if applicable):  Discharge Medication List as of 4/7/2022 12:47 PM        START taking these medications    Details   Capsaicin 0.035 % LOTN Apply 5 g topically daily as needed (nausea), Disp-1 each, R-[id number] to substitute cream etc.  Rx for one tube. Normal             Comment: Please note this report has been produced using speech recognition software and may contain errors related to that system including errors in grammar, punctuation, and spelling, as well as words and phrases that may be inappropriate. If there are any questions or concerns please feel free to contact the dictating provider for clarification.        Lydia Giron MD  04/07/22 9105

## 2022-04-08 LAB
CULTURE: NORMAL
Lab: NORMAL
SPECIMEN: NORMAL

## 2022-05-18 ENCOUNTER — HOSPITAL ENCOUNTER (EMERGENCY)
Age: 27
Discharge: HOME | End: 2022-05-18
Attending: EMERGENCY MEDICINE
Payer: MEDICARE

## 2022-05-18 VITALS
SYSTOLIC BLOOD PRESSURE: 132 MMHG | BODY MASS INDEX: 26.66 KG/M2 | OXYGEN SATURATION: 98 % | WEIGHT: 180 LBS | HEIGHT: 69 IN | HEART RATE: 74 BPM | DIASTOLIC BLOOD PRESSURE: 80 MMHG | TEMPERATURE: 96.7 F | RESPIRATION RATE: 16 BRPM

## 2022-05-18 DIAGNOSIS — R11.2 NAUSEA, VOMITING AND DIARRHEA: Primary | ICD-10-CM

## 2022-05-18 DIAGNOSIS — R19.7 NAUSEA, VOMITING AND DIARRHEA: Primary | ICD-10-CM

## 2022-05-18 LAB
ALBUMIN SERPL-MCNC: 4.5 GM/DL (ref 3.4–5)
ALP BLD-CCNC: 80 IU/L (ref 40–129)
ALT SERPL-CCNC: 14 U/L (ref 10–40)
ANION GAP SERPL CALCULATED.3IONS-SCNC: 19 MMOL/L (ref 4–16)
AST SERPL-CCNC: 16 IU/L (ref 15–37)
BACTERIA: ABNORMAL /HPF
BASOPHILS ABSOLUTE: 0 K/CU MM
BASOPHILS RELATIVE PERCENT: 0.2 % (ref 0–1)
BILIRUB SERPL-MCNC: 0.8 MG/DL (ref 0–1)
BILIRUBIN URINE: NEGATIVE MG/DL
BLOOD, URINE: NEGATIVE
BUN BLDV-MCNC: 11 MG/DL (ref 6–23)
CALCIUM SERPL-MCNC: 9.4 MG/DL (ref 8.3–10.6)
CAST TYPE: ABNORMAL /HPF
CHLORIDE BLD-SCNC: 103 MMOL/L (ref 99–110)
CLARITY: ABNORMAL
CO2: 14 MMOL/L (ref 21–32)
COLOR: YELLOW
COMMENT UA: ABNORMAL
CREAT SERPL-MCNC: 0.8 MG/DL (ref 0.6–1.1)
CRYSTAL TYPE: NEGATIVE /HPF
DIFFERENTIAL TYPE: ABNORMAL
EKG ATRIAL RATE: 74 BPM
EKG DIAGNOSIS: NORMAL
EKG P AXIS: 69 DEGREES
EKG P-R INTERVAL: 140 MS
EKG Q-T INTERVAL: 474 MS
EKG QRS DURATION: 116 MS
EKG QTC CALCULATION (BAZETT): 526 MS
EKG R AXIS: 70 DEGREES
EKG T AXIS: 66 DEGREES
EKG VENTRICULAR RATE: 74 BPM
EOSINOPHILS ABSOLUTE: 0 K/CU MM
EOSINOPHILS RELATIVE PERCENT: 0.1 % (ref 0–3)
EPITHELIAL CELLS, UA: 25 /HPF
GFR AFRICAN AMERICAN: >60 ML/MIN/1.73M2
GFR NON-AFRICAN AMERICAN: >60 ML/MIN/1.73M2
GLUCOSE BLD-MCNC: 204 MG/DL (ref 70–99)
GLUCOSE, URINE: 100 MG/DL
HCT VFR BLD CALC: 46.1 % (ref 37–47)
HEMOGLOBIN: 15.5 GM/DL (ref 12.5–16)
IMMATURE NEUTROPHIL %: 0.2 % (ref 0–0.43)
INTERPRETATION: NORMAL
KETONES, URINE: >80 MG/DL
LEUKOCYTE ESTERASE, URINE: ABNORMAL
LIPASE: 39 IU/L (ref 13–60)
LYMPHOCYTES ABSOLUTE: 1.8 K/CU MM
LYMPHOCYTES RELATIVE PERCENT: 14.6 % (ref 24–44)
MAGNESIUM: 1.6 MG/DL (ref 1.8–2.4)
MCH RBC QN AUTO: 30.8 PG (ref 27–31)
MCHC RBC AUTO-ENTMCNC: 33.6 % (ref 32–36)
MCV RBC AUTO: 91.5 FL (ref 78–100)
MONOCYTES ABSOLUTE: 0.4 K/CU MM
MONOCYTES RELATIVE PERCENT: 3 % (ref 0–4)
NITRITE URINE, QUANTITATIVE: NEGATIVE
PDW BLD-RTO: 13.2 % (ref 11.7–14.9)
PH, URINE: 6.5 (ref 5–8)
PLATELET # BLD: 282 K/CU MM (ref 140–440)
PMV BLD AUTO: 11.5 FL (ref 7.5–11.1)
POTASSIUM SERPL-SCNC: 3.2 MMOL/L (ref 3.5–5.1)
PREGNANCY, URINE: NEGATIVE
PROTEIN UA: ABNORMAL MG/DL
RBC # BLD: 5.04 M/CU MM (ref 4.2–5.4)
RBC URINE: 3 /HPF (ref 0–6)
SEGMENTED NEUTROPHILS ABSOLUTE COUNT: 10.2 K/CU MM
SEGMENTED NEUTROPHILS RELATIVE PERCENT: 81.9 % (ref 36–66)
SODIUM BLD-SCNC: 136 MMOL/L (ref 135–145)
SPECIFIC GRAVITY UA: 1.02 (ref 1–1.03)
SPECIFIC GRAVITY, URINE: 1.02 (ref 1–1.03)
TOTAL IMMATURE NEUTOROPHIL: 0.03 K/CU MM
TOTAL PROTEIN: 7.3 GM/DL (ref 6.4–8.2)
UROBILINOGEN, URINE: 0.2 MG/DL (ref 0.2–1)
WBC # BLD: 12.5 K/CU MM (ref 4–10.5)
WBC UA: 5 /HPF (ref 0–5)

## 2022-05-18 PROCEDURE — 80053 COMPREHEN METABOLIC PANEL: CPT

## 2022-05-18 PROCEDURE — 83690 ASSAY OF LIPASE: CPT

## 2022-05-18 PROCEDURE — 2580000003 HC RX 258: Performed by: EMERGENCY MEDICINE

## 2022-05-18 PROCEDURE — 81001 URINALYSIS AUTO W/SCOPE: CPT

## 2022-05-18 PROCEDURE — 93010 ELECTROCARDIOGRAM REPORT: CPT | Performed by: INTERNAL MEDICINE

## 2022-05-18 PROCEDURE — 85025 COMPLETE CBC W/AUTO DIFF WBC: CPT

## 2022-05-18 PROCEDURE — 81025 URINE PREGNANCY TEST: CPT

## 2022-05-18 PROCEDURE — 96376 TX/PRO/DX INJ SAME DRUG ADON: CPT

## 2022-05-18 PROCEDURE — 2500000003 HC RX 250 WO HCPCS: Performed by: EMERGENCY MEDICINE

## 2022-05-18 PROCEDURE — 83735 ASSAY OF MAGNESIUM: CPT

## 2022-05-18 PROCEDURE — 96365 THER/PROPH/DIAG IV INF INIT: CPT

## 2022-05-18 PROCEDURE — 93005 ELECTROCARDIOGRAM TRACING: CPT | Performed by: EMERGENCY MEDICINE

## 2022-05-18 PROCEDURE — 96372 THER/PROPH/DIAG INJ SC/IM: CPT

## 2022-05-18 PROCEDURE — 99284 EMERGENCY DEPT VISIT MOD MDM: CPT

## 2022-05-18 PROCEDURE — 6360000002 HC RX W HCPCS: Performed by: EMERGENCY MEDICINE

## 2022-05-18 PROCEDURE — A4216 STERILE WATER/SALINE, 10 ML: HCPCS | Performed by: EMERGENCY MEDICINE

## 2022-05-18 PROCEDURE — 96375 TX/PRO/DX INJ NEW DRUG ADDON: CPT

## 2022-05-18 RX ORDER — ONDANSETRON 2 MG/ML
4 INJECTION INTRAMUSCULAR; INTRAVENOUS EVERY 6 HOURS PRN
Status: DISCONTINUED | OUTPATIENT
Start: 2022-05-18 | End: 2022-05-18 | Stop reason: HOSPADM

## 2022-05-18 RX ORDER — ONDANSETRON 4 MG/1
4 TABLET, ORALLY DISINTEGRATING ORAL EVERY 8 HOURS PRN
Qty: 20 TABLET | Refills: 0 | Status: SHIPPED | OUTPATIENT
Start: 2022-05-18 | End: 2022-05-25

## 2022-05-18 RX ORDER — FAMOTIDINE 20 MG/1
20 TABLET, FILM COATED ORAL 2 TIMES DAILY
Qty: 60 TABLET | Refills: 0 | Status: SHIPPED | OUTPATIENT
Start: 2022-05-18 | End: 2022-06-08

## 2022-05-18 RX ORDER — MAGNESIUM SULFATE IN WATER 40 MG/ML
2000 INJECTION, SOLUTION INTRAVENOUS ONCE
Status: COMPLETED | OUTPATIENT
Start: 2022-05-18 | End: 2022-05-18

## 2022-05-18 RX ORDER — PROMETHAZINE HYDROCHLORIDE 25 MG/1
25 SUPPOSITORY RECTAL EVERY 6 HOURS PRN
Qty: 20 SUPPOSITORY | Refills: 0 | Status: SHIPPED | OUTPATIENT
Start: 2022-05-18 | End: 2022-05-23

## 2022-05-18 RX ORDER — 0.9 % SODIUM CHLORIDE 0.9 %
1000 INTRAVENOUS SOLUTION INTRAVENOUS ONCE
Status: COMPLETED | OUTPATIENT
Start: 2022-05-18 | End: 2022-05-18

## 2022-05-18 RX ORDER — PROMETHAZINE HYDROCHLORIDE 25 MG/ML
12.5 INJECTION, SOLUTION INTRAMUSCULAR; INTRAVENOUS ONCE
Status: COMPLETED | OUTPATIENT
Start: 2022-05-18 | End: 2022-05-18

## 2022-05-18 RX ADMIN — ONDANSETRON 4 MG: 2 INJECTION INTRAMUSCULAR; INTRAVENOUS at 10:57

## 2022-05-18 RX ADMIN — ONDANSETRON 4 MG: 2 INJECTION INTRAMUSCULAR; INTRAVENOUS at 11:55

## 2022-05-18 RX ADMIN — SODIUM CHLORIDE 1000 ML: 9 INJECTION, SOLUTION INTRAVENOUS at 12:13

## 2022-05-18 RX ADMIN — MAGNESIUM SULFATE HEPTAHYDRATE 2000 MG: 2 INJECTION, SOLUTION INTRAVENOUS at 11:51

## 2022-05-18 RX ADMIN — SODIUM CHLORIDE 1000 ML: 9 INJECTION, SOLUTION INTRAVENOUS at 10:56

## 2022-05-18 RX ADMIN — PROMETHAZINE HYDROCHLORIDE 12.5 MG: 25 INJECTION INTRAMUSCULAR; INTRAVENOUS at 10:59

## 2022-05-18 RX ADMIN — FAMOTIDINE 20 MG: 10 INJECTION INTRAVENOUS at 10:57

## 2022-05-18 NOTE — Clinical Note
Tati Garcia was seen and treated in our emergency department on 5/18/2022. She may return to work on 05/20/2022. If you have any questions or concerns, please don't hesitate to call.       Pippa Clifford MD

## 2022-05-18 NOTE — ED NOTES
Pt given ice chips, as approved by Dr. Faiza Arias. Pt's son also given juice to drink.       Blayne Chi RN  05/18/22 4460

## 2022-05-18 NOTE — ED PROVIDER NOTES
Emergency Department Encounter  [address]    Patient: Gin Coker  MRN: [id number]  : 1995  Date of Evaluation: 2022  ED Provider: Cesar Howard MD    Chief Complaint       Chief Complaint   Patient presents with    Emesis    Nausea     ROSA Coker is a 32 y.o. female who presents to the emergency department for evaluation of nausea vomiting diarrhea. Patient reports been usual state of health into the last couple of days when symptoms started. Positive sick contacts at home. No recent travel no changes of diet or medications. Patient does report that she tried to take Zofran and Phenergan but she was unable to keep those down. Patient denies fevers, chest pain, shortness of breath or history of any abdominal surgeries. She says she thinks that she might have cyclic vomiting syndrome. Does endorse marijuana usage though. ROS:     At least 10 systems reviewed and otherwise acutely negative except as in the [address] Past History     Past Medical History:   Diagnosis Date    Patient denies medical problems      Past Surgical History:   Procedure Laterality Date    TONSILLECTOMY       Social History     Socioeconomic History    Marital status:       Spouse name: None    Number of children: None    Years of education: None    Highest education level: None   Occupational History    None   Tobacco Use    Smoking status: Current Every Day Smoker     Packs/day: 0.50     Years: 1.00     Pack years: 0.50    Smokeless tobacco: Never Used   Substance and Sexual Activity    Alcohol use: No    Drug use: Yes     Types: Marijuana Flor Cecily)     Comment: occ    Sexual activity: Yes   Other Topics Concern    None   Social History Narrative    None     Social Determinants of Health     Financial Resource Strain:     Difficulty of Paying Living Expenses: Not on file   Food Insecurity:     Worried About Running Out of Food in the Last Year: Not on file  Ran Out of Food in the Last Year: Not on file   Transportation Needs:     Lack of Transportation (Medical): Not on file    Lack of Transportation (Non-Medical): Not on file   Physical Activity:     Days of Exercise per Week: Not on file    Minutes of Exercise per Session: Not on file   Stress:     Feeling of Stress : Not on file   Social Connections:     Frequency of Communication with Friends and Family: Not on file    Frequency of Social Gatherings with Friends and Family: Not on file    Attends Yazidism Services: Not on file    Active Member of [address] [place] [address] [employer] or Organizations: Not on file    Attends Club or Organization Meetings: Not on file    Marital Status: Not on file   Intimate Partner Violence:     Fear of Current or Ex-Partner: Not on file    Emotionally Abused: Not on file    Physically Abused: Not on file    Sexually Abused: Not on file   Housing Stability:     Unable to Pay for Housing in the Last Year: Not on file    Number of [place] in the Last Year: Not on file    Unstable Housing in the Last Year: Not on file       Medications/Allergies     Previous Medications    DIVALPROEX (DEPAKOTE ER) 250 MG EXTENDED RELEASE TABLET    Take 750 mg by mouth 2 times daily    FAMOTIDINE (PEPCID) 20 MG TABLET    Take 1 tablet by mouth 2 times daily    ONDANSETRON (ZOFRAN) 4 MG TABLET    Take 1 tablet by mouth every 8 hours as needed for Nausea    ONDANSETRON (ZOFRAN-ODT) 4 MG DISINTEGRATING TABLET    Take 4 mg by mouth every 6 hours    PREN-FE-METH-FA-OMEG W/O A (PRENATE ESSENTIAL) 29-0.6-0.4-340 MG CAPS    Take 1 capsule by mouth daily     No Known Allergies     Physical Exam       ED Triage Vitals [05/18/22 1043]   BP Temp Temp src Pulse Resp SpO2 Height Weight   132/80 -- -- 89 16 100 % 5' 9\" (1.753 m) 180 lb (81.6 kg)     GENERAL APPEARANCE: Awake and alert. Cooperative. No acute distress. HEAD: Normocephalic. Atraumatic. EYES: Sclera anicteric.   Pupils equal round reactive to light extraocular movements are intact  ENT: Tolerates saliva. No trismus. Moist mucous membranes  NECK: Supple. Trachea midline. No meningismus  CARDIO: RRR. Radial pulse 2+. No murmurs rubs or gallops appreciated  LUNGS: Respirations unlabored. CTAB. No accessory muscle usage noted. No wheezes rales rhonchi or stridor. ABDOMEN: Soft. Non-distended. Non-tender. No tenderness in right upper quadrant or right lower quadrant to deep palpation  EXTREMITIES: No acute deformities. No unilateral leg swelling or tenderness behind either one of calves  SKIN: Warm and dry. No erythema edema or rashes appreciated  NEUROLOGICAL:  Cranial nerves II through XII grossly intact. No gross facial drooping. Moves all 4 extremities spontaneously. PSYCHIATRIC: Normal mood. Alert and oriented x3. No reported active suicidality or homicidality.     Diagnostics   Labs:  Results for orders placed or performed during the hospital encounter of 05/18/22   CBC with Auto Differential   Result Value Ref Range    WBC 12.5 (H) 4.0 - 10.5 K/CU MM    RBC 5.04 4.2 - 5.4 M/CU MM    Hemoglobin 15.5 12.5 - 16.0 GM/DL    Hematocrit 46.1 37 - 47 %    MCV 91.5 78 - 100 FL    MCH 30.8 27 - 31 PG    MCHC 33.6 32.0 - 36.0 %    RDW 13.2 11.7 - 14.9 %    Platelets 619 106 - 804 K/CU MM    MPV 11.5 (H) 7.5 - 11.1 FL    Differential Type AUTOMATED DIFFERENTIAL     Segs Relative 81.9 (H) 36 - 66 %    Lymphocytes % 14.6 (L) 24 - 44 %    Monocytes % 3.0 0 - 4 %    Eosinophils % 0.1 0 - 3 %    Basophils % 0.2 0 - 1 %    Segs Absolute 10.2 K/CU MM    Lymphocytes Absolute 1.8 K/CU MM    Monocytes Absolute 0.4 K/CU MM    Eosinophils Absolute 0.0 K/CU MM    Basophils Absolute 0.0 K/CU MM    Immature Neutrophil % 0.2 0 - 0.43 %    Total Immature Neutrophil 0.03 K/CU MM   Comprehensive Metabolic Panel w/ Reflex to MG   Result Value Ref Range    Sodium 136 135 - 145 MMOL/L    Potassium 3.2 (L) 3.5 - 5.1 MMOL/L    Chloride 103 99 - 110 mMol/L    CO2 14 (L) 21 - 32 MMOL/L    BUN 11 6 - 23 MG/DL    CREATININE 0.8 0.6 - 1.1 MG/DL    Glucose 204 (H) 70 - 99 MG/DL    Calcium 9.4 8.3 - 10.6 MG/DL    Albumin 4.5 3.4 - 5.0 GM/DL    Total Protein 7.3 6.4 - 8.2 GM/DL    Total Bilirubin 0.8 0.0 - 1.0 MG/DL    ALT 14 10 - 40 U/L    AST 16 15 - 37 IU/L    Alkaline Phosphatase 80 40 - 129 IU/L    GFR Non-African American >60 >60 mL/min/1.73m2    GFR African American >60 >60 mL/min/1.73m2    Anion Gap 19 (H) 4 - 16   Lipase   Result Value Ref Range    Lipase 39 13 - 60 IU/L   Urinalysis with Microscopic   Result Value Ref Range    Color, UA YELLOW YELLOW    Clarity, UA SLIGHTLY CLOUDY (A) CLEAR    Glucose, Urine 100 (A) NEGATIVE MG/DL    Bilirubin Urine NEGATIVE NEGATIVE MG/DL    Ketones, Urine >80 (A) NEGATIVE MG/DL    Specific Gravity, UA 1.020 1.001 - 1.035    Blood, Urine NEGATIVE NEGATIVE    pH, Urine 6.5 5.0 - 8.0    Protein, UA TRACE (A) NEGATIVE MG/DL    Urobilinogen, Urine 0.2 0.2 - 1.0 MG/DL    Nitrite Urine, Quantitative NEGATIVE NEGATIVE    Leukocyte Esterase, Urine SMALL (A) NEGATIVE    RBC, UA 3 0 - 6 /HPF    WBC, UA 5 0 - 5 /HPF    Epithelial Cells, UA 25 /HPF    Cast Type NO CAST FORMS SEEN NO CAST FORMS SEEN /HPF    Bacteria, UA FEW (A) NEGATIVE /HPF    Crystal Type NEGATIVE NEGATIVE /HPF    Urinalysis Comments LARGE MUCAS OBSERVED    Pregnancy, Urine   Result Value Ref Range    Pregnancy, Urine NEGATIVE NEGATIVE    Specific Gravity, Urine 1.020 1.001 - 1.035    Interpretation HCG METHOD LIMITATIONS:    Magnesium   Result Value Ref Range    Magnesium 1.6 (L) 1.8 - 2.4 mg/dl   EKG 12 Lead   Result Value Ref Range    Ventricular Rate 74 BPM    Atrial Rate 74 BPM    P-R Interval 140 ms    QRS Duration 116 ms    Q-T Interval 474 ms    QTc Calculation (Bazett) 526 ms    P Axis 69 degrees    R Axis 70 degrees    T Axis 66 degrees    Diagnosis       Normal sinus rhythm  Prolonged QT  Abnormal ECG  When compared with ECG of 08-FEB-2022 07:57,  No significant change was found Radiographs:  No results found. Procedures/EKG:   Patient screening EKG as interpreted by me sinus rhythm rate 74   no ST elevation no ST depression I appreciate no acute ischemia or infarction on this EKG no old EKGs with which to compare    ED Course and MDM   In brief, Mohini Ruff is a 32 y.o. female who presented to the emergency department for evaluation of nausea vomiting diarrhea. Based on patient's history and physical would be concern for possible viral syndrome other possibilities patient symptoms do include cyclic vomiting syndrome or cannabis hyperemesis syndrome. Patient has no tenderness in right upper quadrant or right lower quadrant to deep palpation. Does not appear overly dehydrated no tenting of her skin or dry mucous membranes. Patient did receive IV fluids, Zofran, Phenergan, Pepcid. I do not feel that it would be prudent to use Haldol or droperidol given the prolonged QTC noted on her EKG    Did review patient's laboratory work as noted above. I do not believe that she needs any imaging studies. She received Zofran, Phenergan, IV fluids as well as magnesium. She was able to tolerate p.o. challenge. She is requesting to be discharged home at this time. Encouraged to have a clear liquid diet for next 24 hours followed by low-fat low-cholesterol diet.     Return to the emergency department for recurrent abdominal pain, fevers, inability tolerating by mouth or any other concerning symptoms she did express a verbal understanding these instructions and does feel comfortable to be discharged home      ED Medication Orders (From admission, onward)    Start Ordered     Status Ordering Provider    05/18/22 1151 05/18/22 1151  ondansetron (ZOFRAN) injection 4 mg  EVERY 6 HOURS PRN         Last MAR action: Given - by Juanito Jama on 05/18/22 at [address] [place] [address]    05/18/22 1145 05/18/22 1144  0.9 % sodium chloride bolus  ONCE         Last MAR action: Stopped - by Airam Other on 05/18/22 at [address], [place]    05/18/22 1145 05/18/22 1144  magnesium sulfate 2000 mg in 50 mL IVPB premix  ONCE         Last MAR action: Stopped - by Dk Leggett on 05/18/22 at [address], [place]    05/18/22 1045 05/18/22 1042  0.9 % sodium chloride bolus  ONCE         Last MAR action: Stopped - by Airam Other on 05/18/22 at [address], [place]    05/18/22 1045 05/18/22 1042  promethazine (PHENERGAN) injection 12.5 mg  ONCE         Last MAR action: Given - by Rei Peralta on 05/18/22 at [address], [place]    05/18/22 1045 05/18/22 1042  famotidine (PEPCID) 20 mg in sodium chloride (PF) 10 mL injection  ONCE         Last MAR action: Given - by Rei Peralta on 05/18/22 at [address] [facility], [place]    05/18/22 1042 05/18/22 1042  ondansetron (ZOFRAN) injection 4 mg  EVERY 6 HOURS PRN         Last MAR action: Given - by Rei Peralta on 05/18/22 at [facility]          Final Impression      1.  Nausea, vomiting and diarrhea      DISPOSITION           (Please note that portions of this note may have been completed with a voice recognition program. Efforts were made to edit the dictations but occasionally words are mis-transcribed.)    Rudy Juares MD  [address] [place] [address]        Rudy Juares MD  05/18/22 5935

## 2022-05-27 ENCOUNTER — HOSPITAL ENCOUNTER (EMERGENCY)
Age: 27
Discharge: HOME | End: 2022-05-27
Attending: EMERGENCY MEDICINE
Payer: MEDICARE

## 2022-05-27 ENCOUNTER — APPOINTMENT (OUTPATIENT)
Dept: GENERAL RADIOLOGY | Age: 27
End: 2022-05-27
Payer: MEDICARE

## 2022-05-27 VITALS
BODY MASS INDEX: 24.88 KG/M2 | OXYGEN SATURATION: 100 % | SYSTOLIC BLOOD PRESSURE: 126 MMHG | RESPIRATION RATE: 18 BRPM | HEIGHT: 69 IN | HEART RATE: 85 BPM | WEIGHT: 168 LBS | DIASTOLIC BLOOD PRESSURE: 81 MMHG | TEMPERATURE: 97.3 F

## 2022-05-27 DIAGNOSIS — R11.2 NON-INTRACTABLE VOMITING WITH NAUSEA, UNSPECIFIED VOMITING TYPE: Primary | ICD-10-CM

## 2022-05-27 LAB
ALBUMIN SERPL-MCNC: 4.9 GM/DL (ref 3.4–5)
ALP BLD-CCNC: 79 IU/L (ref 40–129)
ALT SERPL-CCNC: 21 U/L (ref 10–40)
ANION GAP SERPL CALCULATED.3IONS-SCNC: 17 MMOL/L (ref 4–16)
AST SERPL-CCNC: 19 IU/L (ref 15–37)
BACTERIA: ABNORMAL /HPF
BASOPHILS ABSOLUTE: 0 K/CU MM
BASOPHILS RELATIVE PERCENT: 0.2 % (ref 0–1)
BILIRUB SERPL-MCNC: 1 MG/DL (ref 0–1)
BILIRUBIN DIRECT: 0.2 MG/DL (ref 0–0.3)
BILIRUBIN URINE: NEGATIVE MG/DL
BILIRUBIN, INDIRECT: 0.8 MG/DL (ref 0–0.7)
BLOOD, URINE: ABNORMAL
BUN BLDV-MCNC: 10 MG/DL (ref 6–23)
CALCIUM SERPL-MCNC: 9.4 MG/DL (ref 8.3–10.6)
CAST TYPE: ABNORMAL /HPF
CHLORIDE BLD-SCNC: 101 MMOL/L (ref 99–110)
CLARITY: ABNORMAL
CO2: 20 MMOL/L (ref 21–32)
COLOR: YELLOW
COMMENT UA: ABNORMAL
CREAT SERPL-MCNC: 0.6 MG/DL (ref 0.6–1.1)
CRYSTAL TYPE: ABNORMAL /HPF
DIFFERENTIAL TYPE: ABNORMAL
EKG ATRIAL RATE: 76 BPM
EKG DIAGNOSIS: NORMAL
EKG P AXIS: 65 DEGREES
EKG P-R INTERVAL: 126 MS
EKG Q-T INTERVAL: 434 MS
EKG QRS DURATION: 104 MS
EKG QTC CALCULATION (BAZETT): 488 MS
EKG R AXIS: 71 DEGREES
EKG T AXIS: 63 DEGREES
EKG VENTRICULAR RATE: 76 BPM
EOSINOPHILS ABSOLUTE: 0 K/CU MM
EOSINOPHILS RELATIVE PERCENT: 0.3 % (ref 0–3)
EPITHELIAL CELLS, UA: 15 /HPF
GFR AFRICAN AMERICAN: >60 ML/MIN/1.73M2
GFR NON-AFRICAN AMERICAN: >60 ML/MIN/1.73M2
GLUCOSE BLD-MCNC: 119 MG/DL (ref 70–99)
GLUCOSE, URINE: NEGATIVE MG/DL
HCT VFR BLD CALC: 45.1 % (ref 37–47)
HEMOGLOBIN: 15.3 GM/DL (ref 12.5–16)
IMMATURE NEUTROPHIL %: 0.1 % (ref 0–0.43)
INTERPRETATION: NORMAL
KETONES, URINE: 40 MG/DL
LEUKOCYTE ESTERASE, URINE: ABNORMAL
LIPASE: 87 IU/L (ref 13–60)
LYMPHOCYTES ABSOLUTE: 1.2 K/CU MM
LYMPHOCYTES RELATIVE PERCENT: 13 % (ref 24–44)
MCH RBC QN AUTO: 30.6 PG (ref 27–31)
MCHC RBC AUTO-ENTMCNC: 33.9 % (ref 32–36)
MCV RBC AUTO: 90.2 FL (ref 78–100)
MONOCYTES ABSOLUTE: 0.6 K/CU MM
MONOCYTES RELATIVE PERCENT: 6.7 % (ref 0–4)
NITRITE URINE, QUANTITATIVE: NEGATIVE
PDW BLD-RTO: 13.4 % (ref 11.7–14.9)
PH, URINE: 8.5 (ref 5–8)
PLATELET # BLD: 253 K/CU MM (ref 140–440)
PMV BLD AUTO: 11.3 FL (ref 7.5–11.1)
POTASSIUM SERPL-SCNC: 3.3 MMOL/L (ref 3.5–5.1)
PREGNANCY, URINE: NEGATIVE
PROTEIN UA: NEGATIVE MG/DL
RBC # BLD: 5 M/CU MM (ref 4.2–5.4)
RBC URINE: 3 /HPF (ref 0–6)
SEGMENTED NEUTROPHILS ABSOLUTE COUNT: 7.2 K/CU MM
SEGMENTED NEUTROPHILS RELATIVE PERCENT: 79.7 % (ref 36–66)
SODIUM BLD-SCNC: 138 MMOL/L (ref 135–145)
SPECIFIC GRAVITY UA: 1.01 (ref 1–1.03)
SPECIFIC GRAVITY, URINE: 1.01 (ref 1–1.03)
TOTAL IMMATURE NEUTOROPHIL: 0.01 K/CU MM
TOTAL PROTEIN: 7.2 GM/DL (ref 6.4–8.2)
UROBILINOGEN, URINE: 1 MG/DL (ref 0.2–1)
WBC # BLD: 9.1 K/CU MM (ref 4–10.5)
WBC UA: 5 /HPF (ref 0–5)

## 2022-05-27 PROCEDURE — 6360000002 HC RX W HCPCS: Performed by: EMERGENCY MEDICINE

## 2022-05-27 PROCEDURE — 6370000000 HC RX 637 (ALT 250 FOR IP): Performed by: EMERGENCY MEDICINE

## 2022-05-27 PROCEDURE — 82248 BILIRUBIN DIRECT: CPT

## 2022-05-27 PROCEDURE — 83690 ASSAY OF LIPASE: CPT

## 2022-05-27 PROCEDURE — 71045 X-RAY EXAM CHEST 1 VIEW: CPT

## 2022-05-27 PROCEDURE — 93005 ELECTROCARDIOGRAM TRACING: CPT | Performed by: EMERGENCY MEDICINE

## 2022-05-27 PROCEDURE — 99285 EMERGENCY DEPT VISIT HI MDM: CPT

## 2022-05-27 PROCEDURE — 96372 THER/PROPH/DIAG INJ SC/IM: CPT

## 2022-05-27 PROCEDURE — 93010 ELECTROCARDIOGRAM REPORT: CPT | Performed by: INTERNAL MEDICINE

## 2022-05-27 PROCEDURE — 85025 COMPLETE CBC W/AUTO DIFF WBC: CPT

## 2022-05-27 PROCEDURE — 96374 THER/PROPH/DIAG INJ IV PUSH: CPT

## 2022-05-27 PROCEDURE — 96375 TX/PRO/DX INJ NEW DRUG ADDON: CPT

## 2022-05-27 PROCEDURE — 2580000003 HC RX 258: Performed by: EMERGENCY MEDICINE

## 2022-05-27 PROCEDURE — 81025 URINE PREGNANCY TEST: CPT

## 2022-05-27 PROCEDURE — 80053 COMPREHEN METABOLIC PANEL: CPT

## 2022-05-27 PROCEDURE — 81001 URINALYSIS AUTO W/SCOPE: CPT

## 2022-05-27 PROCEDURE — C9113 INJ PANTOPRAZOLE SODIUM, VIA: HCPCS | Performed by: EMERGENCY MEDICINE

## 2022-05-27 RX ORDER — PROMETHAZINE HYDROCHLORIDE 25 MG/ML
25 INJECTION, SOLUTION INTRAMUSCULAR; INTRAVENOUS ONCE
Status: COMPLETED | OUTPATIENT
Start: 2022-05-27 | End: 2022-05-27

## 2022-05-27 RX ORDER — PROMETHAZINE HYDROCHLORIDE 25 MG/1
25 SUPPOSITORY RECTAL EVERY 6 HOURS PRN
Qty: 28 SUPPOSITORY | Refills: 0 | Status: SHIPPED | OUTPATIENT
Start: 2022-05-27 | End: 2022-06-03

## 2022-05-27 RX ORDER — DIPHENHYDRAMINE HYDROCHLORIDE 50 MG/ML
25 INJECTION INTRAMUSCULAR; INTRAVENOUS ONCE
Status: COMPLETED | OUTPATIENT
Start: 2022-05-27 | End: 2022-05-27

## 2022-05-27 RX ORDER — PANTOPRAZOLE SODIUM 40 MG/10ML
40 INJECTION, POWDER, LYOPHILIZED, FOR SOLUTION INTRAVENOUS ONCE
Status: COMPLETED | OUTPATIENT
Start: 2022-05-27 | End: 2022-05-27

## 2022-05-27 RX ORDER — 0.9 % SODIUM CHLORIDE 0.9 %
1000 INTRAVENOUS SOLUTION INTRAVENOUS ONCE
Status: DISCONTINUED | OUTPATIENT
Start: 2022-05-27 | End: 2022-05-27 | Stop reason: HOSPADM

## 2022-05-27 RX ORDER — POTASSIUM CHLORIDE 750 MG/1
40 TABLET, FILM COATED, EXTENDED RELEASE ORAL ONCE
Status: COMPLETED | OUTPATIENT
Start: 2022-05-27 | End: 2022-05-27

## 2022-05-27 RX ORDER — 0.9 % SODIUM CHLORIDE 0.9 %
1000 INTRAVENOUS SOLUTION INTRAVENOUS ONCE
Status: COMPLETED | OUTPATIENT
Start: 2022-05-27 | End: 2022-05-27

## 2022-05-27 RX ADMIN — SODIUM CHLORIDE 1000 ML: 9 INJECTION, SOLUTION INTRAVENOUS at 09:31

## 2022-05-27 RX ADMIN — POTASSIUM CHLORIDE 40 MEQ: 750 TABLET, FILM COATED, EXTENDED RELEASE ORAL at 10:07

## 2022-05-27 RX ADMIN — PROMETHAZINE HYDROCHLORIDE 25 MG: 25 INJECTION INTRAMUSCULAR; INTRAVENOUS at 09:45

## 2022-05-27 RX ADMIN — PANTOPRAZOLE SODIUM 40 MG: 40 INJECTION, POWDER, FOR SOLUTION INTRAVENOUS at 09:45

## 2022-05-27 RX ADMIN — DIPHENHYDRAMINE HYDROCHLORIDE 25 MG: 50 INJECTION, SOLUTION INTRAMUSCULAR; INTRAVENOUS at 09:45

## 2022-05-27 ASSESSMENT — PAIN - FUNCTIONAL ASSESSMENT: PAIN_FUNCTIONAL_ASSESSMENT: 0-10

## 2022-05-27 ASSESSMENT — PAIN DESCRIPTION - DESCRIPTORS: DESCRIPTORS: ACHING

## 2022-05-27 ASSESSMENT — PAIN DESCRIPTION - ORIENTATION: ORIENTATION: MID;UPPER

## 2022-05-27 ASSESSMENT — PAIN DESCRIPTION - FREQUENCY: FREQUENCY: CONTINUOUS

## 2022-05-27 ASSESSMENT — PAIN SCALES - GENERAL: PAINLEVEL_OUTOF10: 7

## 2022-05-27 ASSESSMENT — PAIN DESCRIPTION - LOCATION: LOCATION: ABDOMEN

## 2022-05-27 ASSESSMENT — PAIN DESCRIPTION - PAIN TYPE: TYPE: ACUTE PAIN

## 2022-05-27 NOTE — ED PROVIDER NOTES
Emergency Department Encounter    Patient: Yen Mg  MRN: [id number]  : 1995  Date of Evaluation: 2022  ED Provider:  Wandy Rodriguez MD    Triage Chief Complaint:   Emesis (REPORTS OF VOMITING FOR MORE THAN HOURS)    [place]:  Yen Mg is a 32 y.o. female that presents complaining of 24 hours of nausea and vomiting. Patient states that she is not being given a formal diagnosis as to why she frequently has nausea and vomiting stating that she has been treated for cyclic vomiting syndrome. Patient denies any abdominal pain. No urinary symptoms. No constipation, diarrhea, hematochezia, melena. Patient denies abnormal vaginal discharge or bleeding. Patient was recently hospitalized for similar symptoms in the [facility]. Patient states that she feels dehydrated given the degree of her nausea and vomiting. Patient states that although the symptoms have been worsened over the past 24 hours, symptoms have been ongoing for a prolonged period of time. Patient does endorse recent marijuana use 2 weeks ago. No other drug use. No radiation. Symptoms are moderate to severe. No other alleviating symptoms. Patient attempted to treat with medications at home although states that she is unable to keep those medications down.     ROS - see HPI, below listed is current ROS at time of my eval:  General:  No fevers, no chills  Eyes:   no discharge  ENT:  No sore throat, no nasal congestion  Cardiovascular: + Chest pain after vomiting  Respiratory:  No shortness of breath, no cough  Gastrointestinal: No pain, + nausea, + vomiting, no diarrhea  Musculoskeletal:  No muscle pain, no joint pain  Skin:  No rash, no pruritis  Neurologic:   no headache  Genitourinary:  No dysuria  Endocrine:  No unexpected weight gain, no unexpected weight loss  Extremities:  no edema, no pain    Past Medical History:   Diagnosis Date    Patient denies medical problems      Past Surgical History:   Procedure Laterality Date    TONSILLECTOMY       History reviewed. No pertinent family history. Social History     Socioeconomic History    Marital status:      Spouse name: Not on file    Number of children: Not on file    Years of education: Not on file    Highest education level: Not on file   Occupational History    Not on file   Tobacco Use    Smoking status: Current Every Day Smoker     Packs/day: 0.50     Years: 1.00     Pack years: 0.50    Smokeless tobacco: Never Used   Substance and Sexual Activity    Alcohol use: No    Drug use: Yes     Types: Marijuana Dauna Other)     Comment: occ    Sexual activity: Yes   Other Topics Concern    Not on file   Social History Narrative    Not on file     Social Determinants of Health     Financial Resource Strain:     Difficulty of Paying Living Expenses: Not on file   Food Insecurity:     Worried About Running Out of Food in the Last Year: Not on file    Richard of Food in the Last Year: Not on file   Transportation Needs:     Lack of Transportation (Medical): Not on file    Lack of Transportation (Non-Medical):  Not on file   Physical Activity:     Days of Exercise per Week: Not on file    Minutes of Exercise per Session: Not on file   Stress:     Feeling of Stress : Not on file   Social Connections:     Frequency of Communication with Friends and Family: Not on file    Frequency of Social Gatherings with Friends and Family: Not on file    Attends Worship Services: Not on file    Active Member of Clubs or Organizations: Not on file    Attends Club or Organization Meetings: Not on file    Marital Status: Not on file   Intimate Partner Violence:     Fear of Current or Ex-Partner: Not on file    Emotionally Abused: Not on file    Physically Abused: Not on file    Sexually Abused: Not on file   Housing Stability:     Unable to Pay for Housing in the Last Year: Not on file    Number of [place] in the Last Year: Not on file    Unstable Housing in the Last Year: Not on file     Current Facility-Administered Medications   Medication Dose Route Frequency Provider Last Rate Last Admin    0.9 % sodium chloride bolus  1,000 mL IntraVENous Once Teryl Boxer, MD         Current Outpatient Medications   Medication Sig Dispense Refill    promethazine (PROMETHEGAN) 25 MG suppository Place 1 suppository rectally every 6 hours as needed for Nausea 28 suppository 0    famotidine (PEPCID) 20 MG tablet Take 1 tablet by mouth 2 times daily 60 tablet 0    famotidine (PEPCID) 20 MG tablet Take 1 tablet by mouth 2 times daily 60 tablet 0    divalproex (DEPAKOTE ER) 250 MG extended release tablet Take 750 mg by mouth 2 times daily (Patient not taking: Reported on 4/4/2022)      ondansetron (ZOFRAN) 4 MG tablet Take 1 tablet by mouth every 8 hours as needed for Nausea (Patient not taking: Reported on 5/27/2022) 10 tablet 0    Pren-Fe-Meth-FA-Omeg w/o A (PRENATE ESSENTIAL) 29-0.6-0.4-340 MG CAPS Take 1 capsule by mouth daily (Patient not taking: Reported on 2/21/2022)      ondansetron (ZOFRAN-ODT) 4 MG disintegrating tablet Take 4 mg by mouth every 6 hours       No Known Allergies    Nursing Notes Reviewed    Physical Exam:  Triage VS:    ED Triage Vitals [05/27/22 0858]   Enc Vitals Group      /81      Heart Rate 85      Resp 18      Temp 97.3 °F (36.3 °C)      Temp Source Skin      SpO2 100 %      Weight 168 lb (76.2 kg)      Height 5' 9\" (1.753 m)      Head Circumference       Peak Flow       Pain Score       Pain Loc       Pain Edu? Excl. in [address]? My pulse ox interpretation is - normal    General appearance:  No acute distress. Skin:  Warm. Dry. Eye:  Extraocular movements intact. Ears, nose, mouth and throat:  Oral mucosa dry  Neck:  Trachea midline. Extremity:   Normal ROM     Heart:  Regular rate and rhythm with no murmurs rubs or gallop  Perfusion:  intact  Respiratory: Respirations nonlabored.    Clear to auscultation bilaterally without wheezes rhonchi or rales  Abdominal:  Normal bowel sounds. Soft. Very mild left upper quadrant tenderness palpation without rebound or guarding. Otherwise no tenderness to palpation throughout the abdomen. Non distended. Back:  No CVA tenderness to palpation             Neurological:  Alert and oriented times 3. EKG as interpreted by emergency physician: Rate 76, sinus rhythm, normal axis, normal intervals with the exception of QTC of 488, no acute ST-T wave changes.     I have reviewed and interpreted all of the currently available lab results from this visit (if applicable):  Results for orders placed or performed during the hospital encounter of 02/37/92   Basic Metabolic Panel   Result Value Ref Range    Sodium 138 135 - 145 MMOL/L    Potassium 3.3 (L) 3.5 - 5.1 MMOL/L    Chloride 101 99 - 110 mMol/L    CO2 20 (L) 21 - 32 MMOL/L    Anion Gap 17 (H) 4 - 16    BUN 10 6 - 23 MG/DL    CREATININE 0.6 0.6 - 1.1 MG/DL    Glucose 119 (H) 70 - 99 MG/DL    Calcium 9.4 8.3 - 10.6 MG/DL    GFR Non-African American >60 >60 mL/min/1.73m2    GFR African American >60 >60 mL/min/1.73m2   Hepatic Function Panel   Result Value Ref Range    Albumin 4.9 3.4 - 5.0 GM/DL    Total Bilirubin 1.0 0.0 - 1.0 MG/DL    Bilirubin, Direct 0.2 0.0 - 0.3 MG/DL    Bilirubin, Indirect 0.8 (H) 0 - 0.7 MG/DL    Alkaline Phosphatase 79 40 - 129 IU/L    AST 19 15 - 37 IU/L    ALT 21 10 - 40 U/L    Total Protein 7.2 6.4 - 8.2 GM/DL   Lipase   Result Value Ref Range    Lipase 87 (H) 13 - 60 IU/L   Urinalysis with Microscopic   Result Value Ref Range    Color, UA YELLOW YELLOW    Clarity, UA CLOUDY (A) CLEAR    Glucose, Urine NEGATIVE NEGATIVE MG/DL    Bilirubin Urine NEGATIVE NEGATIVE MG/DL    Ketones, Urine 40 (A) NEGATIVE MG/DL    Specific Gravity, UA 1.010 1.001 - 1.035    Blood, Urine MODERATE (A) NEGATIVE    pH, Urine 8.5 (HH) 5.0 - 8.0    Protein, UA NEGATIVE NEGATIVE MG/DL    Urobilinogen, Urine 1.0 0.2 - 1.0 MG/DL    Nitrite Urine, Quantitative NEGATIVE NEGATIVE    Leukocyte Esterase, Urine SMALL (A) NEGATIVE    RBC, UA 3 0 - 6 /HPF    WBC, UA 5 0 - 5 /HPF    Epithelial Cells, UA 15 /HPF    Cast Type NO CAST FORMS SEEN NO CAST FORMS SEEN /HPF    Bacteria, UA TRACE (A) NEGATIVE /HPF    Crystal Type FEW STARCH CRYSTALS OBSERVED NEGATIVE /HPF    Urinalysis Comments MODERATE MUCAS OBSERVED    HCG, Urine, Qualitative, Pregnancy (Lab)   Result Value Ref Range    Pregnancy, Urine NEGATIVE NEGATIVE    Specific Gravity, Urine 1.010 1.001 - 1.035    Interpretation HCG METHOD LIMITATIONS:    CBC with Auto Differential   Result Value Ref Range    WBC 9.1 4.0 - 10.5 K/CU MM    RBC 5.00 4.2 - 5.4 M/CU MM    Hemoglobin 15.3 12.5 - 16.0 GM/DL    Hematocrit 45.1 37 - 47 %    MCV 90.2 78 - 100 FL    MCH 30.6 27 - 31 PG    MCHC 33.9 32.0 - 36.0 %    RDW 13.4 11.7 - 14.9 %    Platelets 990 288 - 405 K/CU MM    MPV 11.3 (H) 7.5 - 11.1 FL    Differential Type AUTOMATED DIFFERENTIAL     Segs Relative 79.7 (H) 36 - 66 %    Lymphocytes % 13.0 (L) 24 - 44 %    Monocytes % 6.7 (H) 0 - 4 %    Eosinophils % 0.3 0 - 3 %    Basophils % 0.2 0 - 1 %    Segs Absolute 7.2 K/CU MM    Lymphocytes Absolute 1.2 K/CU MM    Monocytes Absolute 0.6 K/CU MM    Eosinophils Absolute 0.0 K/CU MM    Basophils Absolute 0.0 K/CU MM    Immature Neutrophil % 0.1 0 - 0.43 %    Total Immature Neutrophil 0.01 K/CU MM   EKG 12 Lead   Result Value Ref Range    Ventricular Rate 76 BPM    Atrial Rate 76 BPM    P-R Interval 126 ms    QRS Duration 104 ms    Q-T Interval 434 ms    QTc Calculation (Bazett) 488 ms    P Axis 65 degrees    R Axis 71 degrees    T Axis 63 degrees    Diagnosis       Normal sinus rhythm  Nonspecific ST abnormality  Prolonged QT  Abnormal ECG  When compared with ECG of 18-MAY-2022 11:04,  No significant change was found          MDM:  Patient presented with nausea vomiting and chest discomfort after vomiting.   Abdominal exam nonsurgical and nonfocal.  Patient is afebrile and nontoxic-appearing. At this point presentation appears most consistent with a cyclic vomiting versus another process early in its course. Potassium 3.3 which is similar to the patient's potassium when discharged from the hospital in [address] [place] [address] by record review. Anion gap of 17 with bicarb of 20. This is most likely secondary to dehydration and vomiting. No evidence of acute hepatic or biliary disease. Lipase mildly elevated at 87. No clear evidence or diagnostic criteria for acute pancreatitis at this time. No clear evidence of urinary tract infection. Pregnancy screen negative with no evidence of acute intrauterine or extrauterine pregnancy. No leukocytosis or anemia. EKG with QTC of 488 but otherwise no acute emergent changes to include ACS or malignant arrhythmia. Chest x-ray without evidence of pneumonia, pneumothorax, aortic dissection, acute rib fracture. I do not have a definitive cause for the patient's symptoms, however the patient's marijuana use is likely a contributing factor. No evidence of acute surgical abdomen or peritonitis. No indication for further workup at this time based on patient's history, physical exam, and presentation. I did discuss the possibility of a underlying problem such as, but not limited to, peptic ulcer disease, cyclic vomiting, pregnancy, gastritis, pancreatitis, gallbladder disease, bowel obstruction, bowel ischemia, which may initially not be evident and then become more evident in the future. I do believe that the patient can be treated symptomatically as an outpatient at this time. Patient will be discharged home, he/she was instructed on treatment, monitoring and outpatient followup. The patient was told that if he/she cannot follow up as an outpatient in the discussed time period, they are to return to the ED for reevaluation. Patient understands and agrees with the plan, return warnings given.     Patient has scheduled follow-up with GI on 6/14/2022. Clinical Impression:  1. Non-intractable vomiting with nausea, unspecified vomiting type      Disposition referral (if applicable): Your GI doctor      as scheduled    Disposition medications (if applicable):  New Prescriptions    PROMETHAZINE (PROMETHEGAN) 25 MG SUPPOSITORY    Place 1 suppository rectally every 6 hours as needed for Nausea     ED Provider Disposition Time  DISPOSITION Decision To Discharge 05/27/2022 10:34:27 AM      Comment: Please note this report has been produced using speech recognition software and may contain errors related to that system including errors in grammar, punctuation, and spelling, as well as words and phrases that may be inappropriate. Efforts were made to edit the dictations.         Sachi Mc MD  05/27/22 1037

## 2022-05-27 NOTE — ED NOTES
The patient presents to the er today with complaints of emesis for the past 24 hours. The patient is also complaining of numbness in her hands. The patient is hyperventilating and was asked to slow her breathing down by inhaling through her nose and exhaling through her mouth. The patient responded well to this and the numbness resolved. Family is at the bedside, and the call light is within reach.                 Aylin Loera RN  05/27/22 1040

## 2022-05-27 NOTE — ED NOTES
Discharge instructions given, pt informed prescription was sent to pharmacy. Pt voiced understanding. Ambulatory to exit without incident.       Conor Rivera RN  05/27/22 9921

## 2022-06-08 ENCOUNTER — OFFICE VISIT (OUTPATIENT)
Dept: FAMILY MEDICINE CLINIC | Age: 27
End: 2022-06-08
Payer: MEDICARE

## 2022-06-08 VITALS
SYSTOLIC BLOOD PRESSURE: 118 MMHG | TEMPERATURE: 98.3 F | BODY MASS INDEX: 27.25 KG/M2 | HEART RATE: 104 BPM | DIASTOLIC BLOOD PRESSURE: 70 MMHG | OXYGEN SATURATION: 98 % | WEIGHT: 173.6 LBS | HEIGHT: 67 IN

## 2022-06-08 DIAGNOSIS — R11.15 CYCLIC VOMITING SYNDROME: Primary | ICD-10-CM

## 2022-06-08 DIAGNOSIS — F31.9 BIPOLAR 1 DISORDER (HCC): ICD-10-CM

## 2022-06-08 DIAGNOSIS — F84.5 ASPERGER'S DISORDER: ICD-10-CM

## 2022-06-08 DIAGNOSIS — Z90.79 STATUS POST BILATERAL SALPINGECTOMY: ICD-10-CM

## 2022-06-08 DIAGNOSIS — R82.90 ABNORMAL URINALYSIS: ICD-10-CM

## 2022-06-08 DIAGNOSIS — E05.90 SUBCLINICAL HYPERTHYROIDISM: ICD-10-CM

## 2022-06-08 DIAGNOSIS — E87.6 HYPOKALEMIA: ICD-10-CM

## 2022-06-08 PROBLEM — F63.81 INTERMITTENT EXPLOSIVE DISORDER: Status: ACTIVE | Noted: 2022-06-08

## 2022-06-08 PROBLEM — F63.81 INTERMITTENT EXPLOSIVE DISORDER: Status: RESOLVED | Noted: 2022-06-08 | Resolved: 2022-06-08

## 2022-06-08 PROCEDURE — G8427 DOCREV CUR MEDS BY ELIG CLIN: HCPCS | Performed by: FAMILY MEDICINE

## 2022-06-08 PROCEDURE — G8419 CALC BMI OUT NRM PARAM NOF/U: HCPCS | Performed by: FAMILY MEDICINE

## 2022-06-08 PROCEDURE — 99204 OFFICE O/P NEW MOD 45 MIN: CPT | Performed by: FAMILY MEDICINE

## 2022-06-08 PROCEDURE — 36415 COLL VENOUS BLD VENIPUNCTURE: CPT | Performed by: FAMILY MEDICINE

## 2022-06-08 PROCEDURE — 4004F PT TOBACCO SCREEN RCVD TLK: CPT | Performed by: FAMILY MEDICINE

## 2022-06-08 RX ORDER — PROMETHAZINE HYDROCHLORIDE 25 MG/1
25 TABLET ORAL EVERY 6 HOURS PRN
Qty: 30 TABLET | Refills: 1 | Status: SHIPPED | OUTPATIENT
Start: 2022-06-08 | End: 2022-09-16

## 2022-06-08 RX ORDER — DIVALPROEX SODIUM 250 MG/1
750 TABLET, EXTENDED RELEASE ORAL DAILY
Qty: 90 TABLET | Refills: 1 | Status: SHIPPED | OUTPATIENT
Start: 2022-06-08 | End: 2022-08-06

## 2022-06-08 RX ORDER — SUCRALFATE 1 G/1
1 TABLET ORAL 4 TIMES DAILY PRN
COMMUNITY
Start: 2022-05-28 | End: 2022-09-16

## 2022-06-08 RX ORDER — PROMETHAZINE HYDROCHLORIDE 12.5 MG/1
25 TABLET ORAL EVERY 6 HOURS PRN
COMMUNITY
Start: 2022-04-03 | End: 2022-06-08 | Stop reason: SDUPTHER

## 2022-06-08 RX ORDER — PANTOPRAZOLE SODIUM 40 MG/1
40 TABLET, DELAYED RELEASE ORAL
Qty: 90 TABLET | Refills: 1 | Status: SHIPPED | OUTPATIENT
Start: 2022-06-08 | End: 2022-07-06 | Stop reason: SDUPTHER

## 2022-06-08 NOTE — PROGRESS NOTES
6/8/22    Ania Ray  1995    [place]    HPI - Miguel Scott is a 32 y.o. female who presents today for evaluation of:  Chief Complaint   Patient presents with    New Patient   Vomiting : vomitting and it has been that way for weeks. Vomiting since childhood. Uses some MJ. Vomiting in cycles. Got low K+. Many doctors in the past have blamed the vomiting on using marijuana but she feels that she does not used enough marijuana and she reports having the had the vomiting way back in childhood. Also her daughter has frequent vomiting. Pantoprazole was significantly more helpful than famotidine. Ondansetron did not help the vomiting much but promethazine does. Bipolar disorder: She does report a history of episodes of being very high and energy. A previous mental health doctor prescribed Depakote that was helpful. A subsequent primary care provider did not want a prescribe it. She thinks that things are pretty stable currently. Review of Systems   Genitourinary: Positive for frequency. Negative for dysuria, hematuria, vaginal bleeding, vaginal discharge and vaginal pain. Allergies   Allergen Reactions    Dust Mite Extract     Fish-Derived Products         Family history: She is adopted. OBJECTIVE    /70 (Site: Left Upper Arm, Position: Sitting, Cuff Size: Medium Adult)   Pulse (!) 104   Temp 98.3 °F (36.8 °C) (Infrared)   Ht 5' 6.75\" (1.695 m)   Wt 173 lb 9.6 oz (78.7 kg)   LMP 05/20/2022 (Approximate)   SpO2 98%   BMI 27.39 kg/m²     Physical Exam   Constitutional:       General: Not in acute distress. Appearance: Normal appearance. Not ill-appearing, toxic-appearing or diaphoretic. HENT:      Head: Normocephalic. Right Ear: Tympanic membrane, ear canal and external ear normal.      Left Ear: Tympanic membrane, ear canal and external ear normal.      Nose: No rhinorrhea. Mouth/Throat:      Mouth: Mucous membranes are moist.      Pharynx: Oropharynx is clear.  No Plan:   I believe she likely has cyclic vomiting syndrome. I will prescribe Phenergan for the time being. I intend to learn more about it. I do not think that all of her symptoms are from the marijuana. The cyclic vomiting has led to hypokalemia and so I will test a potassium level. Orders:  -     promethazine (PHENERGAN) 25 MG tablet; Take 1 tablet by mouth every 6 hours as needed for Nausea, Disp-30 tablet, R-1Normal  -     pantoprazole (PROTONIX) 40 MG tablet; Take 1 tablet by mouth every morning (before breakfast), Disp-90 tablet, R-1Normal  2. Asperger's disorder  Assessment & Plan:   She is pretty high functioning. Asked Buerger's may not be a proper diagnosis but I will leave it on her master problem list for the time being. 3. Bipolar 1 disorder ([facility] [address]  Assessment & Plan:   Her history does sound like she has had manic episodes in the past.  Depakote was helpful and I will restart it. It appears from the medicine reconciliation that her dose in the past was 750 mg daily. Orders:  -     divalproex (DEPAKOTE ER) 250 MG extended release tablet; Take 3 tablets by mouth daily, Disp-90 tablet, R-1Normal  4. Subclinical hyperthyroidism  Assessment & Plan: We will check a TSH. Orders:  -     TSH with Reflex to FT4  5. Abnormal urinalysis  -     Culture, Urine  6. Hypokalemia  -     Basic Metabolic Panel  7. Status post bilateral salpingectomy  Assessment & Plan:   She has had her tubes tied so we do not have to worry about pregnancy with Depakote use. I will check a urinalysis since she has had white blood cells in a couple of recent urine tests.     Vanessa Wilkerson MD

## 2022-06-08 NOTE — ASSESSMENT & PLAN NOTE
I believe she likely has cyclic vomiting syndrome. I will prescribe Phenergan for the time being. I intend to learn more about it. I do not think that all of her symptoms are from the marijuana. The cyclic vomiting has led to hypokalemia and so I will test a potassium level.

## 2022-06-08 NOTE — ASSESSMENT & PLAN NOTE
She is pretty high functioning. Asked Buerger's may not be a proper diagnosis but I will leave it on her master problem list for the time being.

## 2022-06-08 NOTE — PATIENT INSTRUCTIONS
Patient Education        Learning About Cyclic Vomiting Syndrome  What is it? An adult or child who has cyclic vomiting syndrome (CVS) has repeated bouts of severe vomiting and nausea. Between the vomiting episodes, the person's health is normal. The cause of CVS isn't known, but it may be related to migraineheadaches. What happens when you have CVS?  CVS causes severe nausea, vomiting, and drooling. You may not be able to walk or talk during an episode. You may look pale. You may have a fever and feelvery tired and thirsty. Some people with CVS have belly pain and diarrhea. Bouts of vomiting can last for a few hours or a few days. People with this condition tend to have a typical pattern of attacks. For example, one person may have bouts of CVS 4 times a year and always in the morning. Another personmay have 8 bouts a year and always in the evening. Some people with CVS have triggers that set off the vomiting. People have reported an infection, such as a cold, as a trigger. Other triggers include stress, menstrual cycles, and certain foods like chocolate or cheese. Childrentend to have more triggers than adults do. How is CVS treated? Treatment is centered around easing the nausea and vomiting. The doctor mayprescribe medicine. During very bad bouts of vomiting, your doctor may want you to stay in the hospital for a while. You may get fluids through a vein (IV) to prevent dehydration. Dehydration can be serious. Your body needs fluids to make enough blood. Without a good supply of blood, vital organs such as the heart and braincan't work as well as they should. When should you call for help? Call your doctor now or seek immediate medical care if:   You have new or worse belly pain.  You have a fever.  You are vomiting.  You cannot pass stools or gas.  You have symptoms of dehydration, such as:  ? Dry eyes and a dry mouth. ? Passing only a little urine. ?  Feeling thirstier than usual.  Watch closely for changes in your health, and be sure to contact your doctor ifyou have any problems. Follow-up care is a key part of your treatment and safety. Be sure to make and go to all appointments, and call your doctor if you are having problems. It's also a good idea to know your test results and keep alist of the medicines you take. Where can you learn more? Go to [email or web address]://[email or web address] org and sign in to your [employer] account. Enter X998 in the [employer] box to learn more about \"Learning About Cyclic Vomiting Syndrome. \"     If you do not have an account, please click on the \"Sign Up Now\" link. Current as of: September 8, 2021               Content Version: 13.2  © 2006-2022 [employer]wise, Incorporated. Care instructions adapted under license by [facility] ([facility]). If you have questions about a medical condition or this instruction, always ask your healthcare professional. [place] [address] any warranty or liability for your use of this information.

## 2022-06-10 LAB — URINE CULTURE, ROUTINE: NORMAL

## 2022-07-06 ENCOUNTER — OFFICE VISIT (OUTPATIENT)
Dept: FAMILY MEDICINE CLINIC | Age: 27
End: 2022-07-06
Payer: MEDICARE

## 2022-07-06 VITALS
SYSTOLIC BLOOD PRESSURE: 110 MMHG | BODY MASS INDEX: 27.91 KG/M2 | TEMPERATURE: 97.3 F | OXYGEN SATURATION: 98 % | DIASTOLIC BLOOD PRESSURE: 70 MMHG | HEIGHT: 67 IN | HEART RATE: 74 BPM | WEIGHT: 177.8 LBS

## 2022-07-06 DIAGNOSIS — R11.15 CYCLIC VOMITING SYNDROME: ICD-10-CM

## 2022-07-06 DIAGNOSIS — Z11.59 NEED FOR HEPATITIS C SCREENING TEST: ICD-10-CM

## 2022-07-06 DIAGNOSIS — F17.200 SMOKER: ICD-10-CM

## 2022-07-06 DIAGNOSIS — R41.3 MEMORY IMPAIRMENT: ICD-10-CM

## 2022-07-06 DIAGNOSIS — Z00.00 ENCOUNTER FOR WELL ADULT EXAM WITHOUT ABNORMAL FINDINGS: Primary | ICD-10-CM

## 2022-07-06 DIAGNOSIS — Z11.4 SCREENING FOR HIV (HUMAN IMMUNODEFICIENCY VIRUS): ICD-10-CM

## 2022-07-06 DIAGNOSIS — Z23 IMMUNIZATION DUE: ICD-10-CM

## 2022-07-06 DIAGNOSIS — F31.9 BIPOLAR 1 DISORDER (HCC): ICD-10-CM

## 2022-07-06 PROCEDURE — 36415 COLL VENOUS BLD VENIPUNCTURE: CPT | Performed by: FAMILY MEDICINE

## 2022-07-06 PROCEDURE — 99395 PREV VISIT EST AGE 18-39: CPT | Performed by: FAMILY MEDICINE

## 2022-07-06 PROCEDURE — G8419 CALC BMI OUT NRM PARAM NOF/U: HCPCS | Performed by: FAMILY MEDICINE

## 2022-07-06 PROCEDURE — G8427 DOCREV CUR MEDS BY ELIG CLIN: HCPCS | Performed by: FAMILY MEDICINE

## 2022-07-06 PROCEDURE — 4004F PT TOBACCO SCREEN RCVD TLK: CPT | Performed by: FAMILY MEDICINE

## 2022-07-06 PROCEDURE — 99213 OFFICE O/P EST LOW 20 MIN: CPT | Performed by: FAMILY MEDICINE

## 2022-07-06 PROCEDURE — 90677 PCV20 VACCINE IM: CPT | Performed by: FAMILY MEDICINE

## 2022-07-06 RX ORDER — BUPROPION HYDROCHLORIDE 150 MG/1
150 TABLET, EXTENDED RELEASE ORAL 2 TIMES DAILY
Qty: 60 TABLET | Refills: 0 | Status: SHIPPED | OUTPATIENT
Start: 2022-07-06 | End: 2022-09-16

## 2022-07-06 RX ORDER — PANTOPRAZOLE SODIUM 40 MG/1
40 TABLET, DELAYED RELEASE ORAL
Qty: 90 TABLET | Refills: 1 | Status: SHIPPED | OUTPATIENT
Start: 2022-07-06 | End: 2022-09-16

## 2022-07-06 ASSESSMENT — ENCOUNTER SYMPTOMS
DIARRHEA: 0
VOMITING: 1
APNEA: 0
COUGH: 0
NAUSEA: 0
CONSTIPATION: 0
SHORTNESS OF BREATH: 0
TROUBLE SWALLOWING: 0
ABDOMINAL PAIN: 0
EYE PAIN: 0

## 2022-07-06 ASSESSMENT — PATIENT HEALTH QUESTIONNAIRE - PHQ9
SUM OF ALL RESPONSES TO PHQ QUESTIONS 1-9: 15
2. FEELING DOWN, DEPRESSED OR HOPELESS: 1
4. FEELING TIRED OR HAVING LITTLE ENERGY: 3
SUM OF ALL RESPONSES TO PHQ QUESTIONS 1-9: 15
3. TROUBLE FALLING OR STAYING ASLEEP: 3
7. TROUBLE CONCENTRATING ON THINGS, SUCH AS READING THE NEWSPAPER OR WATCHING TELEVISION: 3
10. IF YOU CHECKED OFF ANY PROBLEMS, HOW DIFFICULT HAVE THESE PROBLEMS MADE IT FOR YOU TO DO YOUR WORK, TAKE CARE OF THINGS AT HOME, OR GET ALONG WITH OTHER PEOPLE: 2
SUM OF ALL RESPONSES TO PHQ QUESTIONS 1-9: 15
SUM OF ALL RESPONSES TO PHQ QUESTIONS 1-9: 15
5. POOR APPETITE OR OVEREATING: 1
1. LITTLE INTEREST OR PLEASURE IN DOING THINGS: 1
8. MOVING OR SPEAKING SO SLOWLY THAT OTHER PEOPLE COULD HAVE NOTICED. OR THE OPPOSITE, BEING SO FIGETY OR RESTLESS THAT YOU HAVE BEEN MOVING AROUND A LOT MORE THAN USUAL: 3
9. THOUGHTS THAT YOU WOULD BE BETTER OFF DEAD, OR OF HURTING YOURSELF: 0
SUM OF ALL RESPONSES TO PHQ9 QUESTIONS 1 & 2: 2
6. FEELING BAD ABOUT YOURSELF - OR THAT YOU ARE A FAILURE OR HAVE LET YOURSELF OR YOUR FAMILY DOWN: 0

## 2022-07-06 NOTE — ASSESSMENT & PLAN NOTE
Continue Depakote. I told her to call if needs a refill but there should be 1 refill at the pharmacy already. I am adding bupropion but cautioned her to stop it if she starts to get into a manic state.

## 2022-07-06 NOTE — LETTER
Hi Ms Art Old,     I think Ms Rell Malone has Bipolar 1. She got a little better w/ depakote 750 mg daily. She would like to see a psychiatry professional for better management. I am also startign bupropion for smoking cessation and I warned her to let me know and stop if starts to get into a manic state.      Wayne Cruz

## 2022-07-06 NOTE — PATIENT INSTRUCTIONS
Advance Directives: Care Instructions  Overview  An advance directive is a legal way to state your wishes at the end of your life. It tells your family and your doctor what to do if you can't say what youwant. There are two main types of advance directives. You can change them any timeyour wishes change. Living will. This form tells your family and your doctor your wishes about life support and other treatment. The form is also called a declaration. Medical power of . This form lets you name a person to make treatment decisions for you when you can't speak for yourself. This person is called a health care agent (health care proxy, health care surrogate). The form is also called a durable power of  for health care. If you do not have an advance directive, decisions about your medical care maybe made by a family member, or by a doctor or a  who doesn't know you. It may help to think of an advance directive as a gift to the people who carefor you. If you have one, they won't have to make tough decisions by themselves. Follow-up care is a key part of your treatment and safety. Be sure to make and go to all appointments, and call your doctor if you are having problems. It's also a good idea to know your test results and keep alist of the medicines you take. What should you include in an advance directive? Many states have a unique advance directive form. (It may ask you to address specific issues.) Or you might use a universal form that's approved by manystates. If your form doesn't tell you what to address, it may be hard to know what to include in your advance directive. Use the questions below to help you getstarted.  Who do you want to make decisions about your medical care if you are not able to?  What life-support measures do you want if you have a serious illness that gets worse over time or can't be cured?  What are you most afraid of that might happen?  (Maybe you're afraid of having pain, losing your independence, or being kept alive by machines.)   Where would you prefer to die? (Your home? A hospital? A nursing home?)   Do you want to donate your organs when you die?  Do you want certain Catholic practices performed before you die? When should you call for help? Be sure to contact your doctor if you have any questions. Where can you learn more? Go to https://chpepiceweb.Voxie. org and sign in to your Relevance Media account. Enter R264 in the [employer] box to learn more about \"Advance Directives: Care Instructions. \"     If you do not have an account, please click on the \"Sign Up Now\" link. Current as of: October 18, 2021               Content Version: 13.3  © 2006-2022 Healthwise, [place]. Care instructions adapted under license by [facility] ([facility]). If you have questions about a medical condition or this instruction, always ask your healthcare professional. [place] [address] any warranty or liability for your use of this information. Well Visit, Ages 25 to 48: Care Instructions  Overview     Well visits can help you stay healthy. Your doctor has checked your overall health and may have suggested ways to take good care of yourself. Your doctor also may have recommended tests. At home, you can help prevent illness withhealthy eating, regular exercise, and other steps. Follow-up care is a key part of your treatment and safety. Be sure to make and go to all appointments, and call your doctor if you are having problems. It's also a good idea to know your test results and keep alist of the medicines you take. How can you care for yourself at home?  Get screening tests that you and your doctor decide on. Screening helps find diseases before any symptoms appear.  Eat healthy foods. Choose fruits, vegetables, whole grains, protein, and low-fat dairy foods. Limit fat, especially saturated fat.  Reduce salt in your diet.   Limit alcohol. If you are a man, have no more than 2 drinks a day or 14 drinks a week. If you are a woman, have no more than 1 drink a day or 7 drinks a week.  Get at least 30 minutes of physical activity on most days of the week. Walking is a good choice. You also may want to do other activities, such as running, swimming, cycling, or playing tennis or team sports. Discuss any changes in your exercise program with your doctor.  Reach and stay at a healthy weight. This will lower your risk for many problems, such as obesity, diabetes, heart disease, and high blood pressure.  Do not smoke or allow others to smoke around you. If you need help quitting, talk to your doctor about stop-smoking programs and medicines. These can increase your chances of quitting for good.  Care for your mental health. It is easy to get weighed down by worry and stress. Learn strategies to manage stress, like deep breathing and mindfulness, and stay connected with your family and community. If you find you often feel sad or hopeless, talk with your doctor. Treatment can help.  Talk to your doctor about whether you have any risk factors for sexually transmitted infections (STIs). You can help prevent STIs if you wait to have sex with a new partner (or partners) until you've each been tested for STIs. It also helps if you use condoms (male or female condoms) and if you limit your sex partners to one person who only has sex with you. Vaccines are available for some STIs, such as HPV.  Use birth control if it's important to you to prevent pregnancy. Talk with your doctor about the choices available and what might be best for you.  If you think you may have a problem with alcohol or drug use, talk to your doctor. This includes prescription medicines (such as amphetamines and opioids) and illegal drugs (such as cocaine and methamphetamine).  Your doctor can help you figure out what type of treatment is best for you.   Protect your skin from too much sun. When you're outdoors from 10 a.m. to 4 p.m., stay in the shade or cover up with clothing and a hat with a wide brim. Wear sunglasses that block UV rays. Even when it's cloudy, put broad-spectrum sunscreen (SPF 30 or higher) on any exposed skin.  See a dentist one or two times a year for checkups and to have your teeth cleaned.  Wear a seat belt in the car. When should you call for help? Watch closely for changes in your health, and be sure to contact your doctor if you have any problems or symptoms that concern you. Where can you learn more? Go to https://Vivogigpepiceweb.LegitTrader. org and sign in to your RootsRated account. Enter P072 in the mycirQle box to learn more about \"Well Visit, Ages 25 to 48: Care Instructions. \"     If you do not have an account, please click on the \"Sign Up Now\" link. Current as of: October 6, 2021               Content Version: 13.3  © 3515-8066 Healthwise, [place]. Care instructions adapted under license by [facility] ([facility]). If you have questions about a medical condition or this instruction, always ask your healthcare professional. [address] any warranty or liability for your use of this information. Learning About Changing a Habit by Setting Goals  How can you change a habit? If you've decided to change a habit--whether it's quitting smoking, lowering your blood pressure, becoming more active, or doing something else to improve your health--congratulations! Making that decision is the first step towardmaking a change. What happens next? Have a reason. Set goals you can reach. Prepare forslip-ups. And get support. What's your reason? Your reason for wanting to change a habit is really important. Maybe you want to quit smoking so that you can avoid future health problems. Or maybe you want to eat a healthier diet so you can lose weight.  If you have high blood pressure, your reason may be clear: to lower your blood pressure. Maybe yousmoke and want to save money on cigarettes. You need to feel ready to make a change. If you don't feel ready now, that's okay. You can still be thinking and planning. When you truly want to [facility], you're ready for the next step. It's not easy to change habits--but you can do it. Taking the time to reallythink about what will motivate or inspire you will help you reach your goals. How do you set goals? Setting goals can help a lot when you're trying to make a healthy change.  Focus on small goals. This will help you reach larger goals over time. With smaller goals, you'll have success more often, which will help you stay with it. For example, your large goal may be to lose 20 pounds. Your small goal could be to lose 5.   Write down your goals. This will help you remember, and you'll have a clearer idea of what you want to achieve. Use a journal or notebook to record your goals. Hang up your plan where you will see it often as a reminder of what you're trying to do.  Make your goals specific. Specific goals help you measure your progress. For example, setting a goal to eat one extra serving of vegetables a day is better than a general goal to \"eat more vegetables. \"   Focus on one goal at a time. By doing this, you're less likely to feel overwhelmed and then give up.  When you reach a goal, reward yourself. Celebrate your new behavior and success for several days, and then think about setting your next goal.  How can you prepare for slip-ups? It's perfectly normal to try to change a habit, go along fine for a while, and then have a setback. Lots of people try and try again before they reach theirgoals. What are the things that might cause a setback for you? If you have tried tochange a habit before, think about what helped you and what got in your way.   By thinking about these barriers now, you can plan ahead for how to deal withthem if they happen. There will be times when you slip up and don't make your goal for the week. When that happens, don't get mad at yourself. Learn from the experience. Ask yourself what got in the way of reaching your goal. Positive thinking goes along way when you're making lifestyle changes. How can you get support?  Get a partner. It's motivating to know that someone is trying to make the same change that you're making, like being more active or changing your eating habits. You have someone who is counting on you to help them succeed. That person can also remind you how far you've come.  Get friends and family involved. They can exercise with you. Or they can encourage you by saying how they admire what you are doing. Family members can join you in your healthy eating efforts. Don't be afraid to tell family and friends that their encouragement makes a big difference to you.  Join a class or support group. People in these groups often have some of the same barriers you have. They can give you support when you don't feel like staying with your plan. They can boost your morale when you need a lift. Shay Calles also find a number of online support groups.  Encourage yourself. When you feel like giving up, don't waste energy feeling bad about yourself. Remember your reason for wanting to change, think about the progress you've made, and give yourself a pep talk and a pat on the back.  Get professional help. A dietitian can help you make your diet healthier while still allowing you to eat foods that you enjoy. A  or physical therapist can help design an exercise program that is fun and easy to stay on. A counselor, a , or your doctor can help you overcome hurdles, reduce stress, or quit smoking. Where can you learn more? Go to https://jagjit.healthDomain Developers Fund. org and sign in to your Activaero account.  Enter L038 in the AnySource Media box to learn more about \"Learning About Changing a Habit by Setting Goals. \"     If you do not have an account, please click on the \"Sign Up Now\" link. Current as of: February 9, 2022               Content Version: 13.3  © 5925-0144 Healthwise, [employer]. Care instructions adapted under license by [facility] ([facility]). If you have questions about a medical condition or this instruction, always ask your healthcare professional. [address] any warranty or liability for your use of this information.

## 2022-07-06 NOTE — PROGRESS NOTES
7/6/22    Doreatha Cornea  1995  32 y.o. female     Adult Annual Preventive Visit  Chief Complaint   Patient presents with    Follow-up   Bipolar 1: She feels a little better taking depakote 750mg daily. It does help but she would like to see psychiatrist.     Activity habits: Some exercise. Eating habits: healthy ,     Sleep habits: not so good. Social support: Good    Mentation:   Memory - \"horrible\"    Review of Systems   Constitutional: Negative for fatigue and fever. HENT: Negative for nosebleeds and trouble swallowing. Eyes: Negative for pain and visual disturbance. Respiratory: Negative for apnea, cough and shortness of breath. Cardiovascular: Negative for chest pain and leg swelling. Gastrointestinal: Positive for vomiting. Negative for abdominal pain, constipation, diarrhea and nausea. Endocrine: Negative for cold intolerance, heat intolerance and polyuria. Genitourinary: Negative for difficulty urinating and hematuria. Musculoskeletal: Negative for arthralgias and gait problem. Skin: Negative for rash and wound. Allergic/Immunologic: Negative for environmental allergies, food allergies and immunocompromised state. Neurological: Positive for light-headedness. Negative for speech difficulty and headaches. Hematological: Negative for adenopathy. Does not bruise/bleed easily. Psychiatric/Behavioral: Positive for decreased concentration and dysphoric mood. Negative for self-injury and suicidal ideas. The patient is nervous/anxious.           Allergies   Allergen Reactions    Dust Mite Extract     Fish-Derived Products         Current Outpatient Medications   Medication Sig Dispense Refill    pantoprazole (PROTONIX) 40 MG tablet Take 1 tablet by mouth every morning (before breakfast) 90 tablet 1    buPROPion (WELLBUTRIN SR) 150 MG extended release tablet Take 1 tablet by mouth 2 times daily 60 tablet 0    promethazine (PHENERGAN) 25 MG tablet Take 1 tablet by mouth every 6 hours as needed for Nausea 30 tablet 1    divalproex (DEPAKOTE ER) 250 MG extended release tablet Take 3 tablets by mouth daily 90 tablet 1    sucralfate (CARAFATE) 1 GM tablet Take 1 tablet by mouth 4 times daily as needed (Patient not taking: Reported on 7/6/2022)      ondansetron (ZOFRAN) 4 MG tablet Take 1 tablet by mouth every 8 hours as needed for Nausea (Patient not taking: Reported on 7/6/2022) 10 tablet 0     No current facility-administered medications for this visit. PSH : s/p fallopian tube removal.     OBJECTIVE    /70 (Site: Left Upper Arm, Position: Sitting, Cuff Size: Medium Adult)   Pulse 74   Temp 97.3 °F (36.3 °C) (Infrared)   Ht 5' 6.75\" (1.695 m)   Wt 177 lb 12.8 oz (80.6 kg)   SpO2 98%   BMI 28.06 kg/m²     Physical Exam  Psychiatric:         Mood and Affect: Mood is anxious and depressed. Mood is not elated. Affect is not labile, blunt, flat, angry, tearful or inappropriate. Speech: She is communicative. Speech is not rapid and pressured, delayed, slurred or tangential.         Behavior: Behavior is not agitated, slowed, aggressive, withdrawn, hyperactive or combative. Thought Content: Thought content is not paranoid or delusional. Thought content does not include homicidal or suicidal ideation. Cognition and Memory: Cognition is not impaired. Memory is not impaired. Constitutional:       General: Not in acute distress. Appearance: Normal appearance. Not ill-appearing, toxic-appearing or diaphoretic. HENT:      Head: Normocephalic. Right Ear: Tympanic membrane, ear canal and external ear normal.      Left Ear: Tympanic membrane, ear canal and external ear normal.      Nose: No rhinorrhea. Mouth/Throat:      Mouth: Mucous membranes are moist.      Pharynx: Oropharynx is clear. No oropharyngeal exudate or posterior oropharyngeal erythema. Eyes:      General: No scleral icterus. Right eye: No discharge. Left eye: No discharge. Conjunctiva/sclera: Conjunctivae normal.   Neck:      Thyroid: No thyroid mass, thyromegaly or thyroid tenderness. Cardiovascular:      Rate and Rhythm: Normal rate and regular rhythm. Pulses:           Posterior tibial pulses are 2+ on the right side and 2+ on the left side. Heart sounds: Normal heart sounds. No murmur heard. No friction rub. No gallop. Pulmonary:      Effort: Pulmonary effort is normal. No respiratory distress. Breath sounds: Normal breath sounds. No stridor. No wheezing, rhonchi or rales. Abdominal:      General: There is no distension. Palpations: Abdomen is soft. Tenderness: There is no abdominal tenderness. There is no guarding. Musculoskeletal:         General: No deformity. Cervical back: No rigidity. Right lower leg: No edema. Left lower leg: No edema. Lymphadenopathy:      Cervical: No cervical adenopathy. Right upper body: No supraclavicular or axillary adenopathy. Left upper body: No supraclavicular or axillary adenopathy. Lower Body: No right inguinal adenopathy. No left inguinal adenopathy. Skin:     General: Skin is warm. Coloration: Skin is not jaundiced. Neurological:      Mental Status: She is alert. Deep Tendon Reflexes:      Reflex Scores:       Patellar reflexes are 2+ on the right side and 2+ on the left side. Psychiatric:         Attention and Perception: Attention and perception normal.               Speech: Speech normal.         Behavior: Behavior normal.         Thought Content: Thought content normal.    ASSESSMENT AND PLAN    1. Encounter for well adult exam without abnormal findings  2. Smoker  Assessment & Plan:   Bupropion prescribed to help with smoking cessation. Orders:  -     buPROPion (WELLBUTRIN SR) 150 MG extended release tablet; Take 1 tablet by mouth 2 times daily, Disp-60 tablet, R-0Normal  3.  Need for hepatitis C screening test  -     Hepatitis C Antibody  4. Immunization due  -     Pneumococcal, PCV20, PREVNAR 20, (age 25 yrs+), IM, PF  5. Screening for HIV (human immunodeficiency virus)  -     HIV Screen  6. Cyclic vomiting syndrome  Assessment & Plan:   She gets a definite benefit from pantoprazole so plan to continue it. Orders:  -     pantoprazole (PROTONIX) 40 MG tablet; Take 1 tablet by mouth every morning (before breakfast), Disp-90 tablet, R-1Normal  7. Bipolar 1 disorder (HCC)  Assessment & Plan:   Continue Depakote. I told her to call if needs a refill but there should be 1 refill at the pharmacy already. I am adding bupropion but cautioned her to stop it if she starts to get into a manic state. Orders:  -     [facility]  -     buPROPion [facility] SR) 150 MG extended release tablet; Take 1 tablet by mouth 2 times daily, Disp-60 tablet, R-0Normal  -     Basic Metabolic Panel  -     TSH with Reflex to FT4  8. Memory impairment  -     Vitamin B12 & Folate        Counseling provided for:  Healthy eating - Avoid sugar and other refined carbohydrates. and Eat more foods with fiber like vegetables and whole grain products.   >> Exercise - 1> try to do 150 minutes a week of exercise that is as hard as walking briskly (30 minutes 5 days a week or 22 minutes every day); and 2> do some strength training 2 or 3 times a week. Sleep hygeine - Get enough rest. Things that help are making sure the room is dark and cool, avoiding screen use for 1-2 hours before bedtime, having an unwinding routine. , Mentally activity - Keep trying to learn new things, reading, following sports/fashion/whatever you like, and other mental things to keep your brain working well and avoid dementia. , Alcohol limitation - Limit alcohol consumption to 1(women) or 2(men) standard sized drinks a day. and Cannabis - Heavy cannabis use will lead to a significant decline in your IQ. Recommended smoking cessation.   Remember to be thankful for the good things in life. and Make a list daily of 1-2 things you are thankful for. Return in about 6 weeks (around 8/17/2022) for [facility] follow up.      Pau Ayala MD

## 2022-07-07 LAB
ANION GAP SERPL CALCULATED.3IONS-SCNC: 16 MMOL/L (ref 3–16)
BUN BLDV-MCNC: 16 MG/DL (ref 7–20)
CALCIUM SERPL-MCNC: 9.6 MG/DL (ref 8.3–10.6)
CHLORIDE BLD-SCNC: 104 MMOL/L (ref 99–110)
CO2: 19 MMOL/L (ref 21–32)
CREAT SERPL-MCNC: 0.6 MG/DL (ref 0.6–1.1)
FOLATE: 14.38 NG/ML (ref 4.78–24.2)
GFR AFRICAN AMERICAN: >60
GFR NON-AFRICAN AMERICAN: >60
GLUCOSE BLD-MCNC: 94 MG/DL (ref 70–99)
HEPATITIS C ANTIBODY INTERPRETATION: NORMAL
HIV AG/AB: NORMAL
HIV ANTIGEN: NORMAL
HIV-1 ANTIBODY: NORMAL
HIV-2 AB: NORMAL
POTASSIUM SERPL-SCNC: 4.3 MMOL/L (ref 3.5–5.1)
SODIUM BLD-SCNC: 139 MMOL/L (ref 136–145)
TSH REFLEX FT4: 0.49 UIU/ML (ref 0.27–4.2)
VITAMIN B-12: 461 PG/ML (ref 211–911)

## 2022-08-06 DIAGNOSIS — F31.9 BIPOLAR 1 DISORDER (HCC): ICD-10-CM

## 2022-08-06 RX ORDER — DIVALPROEX SODIUM 250 MG/1
750 TABLET, EXTENDED RELEASE ORAL DAILY
Qty: 90 TABLET | Refills: 0 | Status: SHIPPED | OUTPATIENT
Start: 2022-08-06

## 2022-08-19 PROBLEM — O21.0 HYPEREMESIS GRAVIDARUM: Status: RESOLVED | Noted: 2020-05-27 | Resolved: 2022-08-19

## 2022-08-19 PROBLEM — R11.2 INTRACTABLE NAUSEA AND VOMITING: Status: RESOLVED | Noted: 2022-04-03 | Resolved: 2022-08-19

## 2022-08-19 PROBLEM — R11.2 NAUSEA AND VOMITING: Status: RESOLVED | Noted: 2020-04-21 | Resolved: 2022-08-19

## 2022-09-14 ENCOUNTER — HOSPITAL ENCOUNTER (EMERGENCY)
Age: 27
Discharge: LEFT BEFORE DISCHARGE | End: 2022-09-14
Payer: MEDICARE

## 2022-09-14 VITALS
SYSTOLIC BLOOD PRESSURE: 115 MMHG | HEART RATE: 74 BPM | TEMPERATURE: 98.4 F | RESPIRATION RATE: 16 BRPM | DIASTOLIC BLOOD PRESSURE: 71 MMHG | OXYGEN SATURATION: 99 %

## 2022-09-14 DIAGNOSIS — R11.2 NON-INTRACTABLE VOMITING WITH NAUSEA, UNSPECIFIED VOMITING TYPE: Primary | ICD-10-CM

## 2022-09-14 DIAGNOSIS — E83.42 HYPOMAGNESEMIA: ICD-10-CM

## 2022-09-14 LAB
ALBUMIN SERPL-MCNC: 4.9 GM/DL (ref 3.4–5)
ALP BLD-CCNC: 81 IU/L (ref 40–129)
ALT SERPL-CCNC: 22 U/L (ref 10–40)
AMPHETAMINES: NEGATIVE
ANION GAP SERPL CALCULATED.3IONS-SCNC: 15 MMOL/L (ref 4–16)
AST SERPL-CCNC: 20 IU/L (ref 15–37)
BACTERIA: ABNORMAL /HPF
BARBITURATE SCREEN URINE: NEGATIVE
BASOPHILS ABSOLUTE: 0 K/CU MM
BASOPHILS RELATIVE PERCENT: 0.1 % (ref 0–1)
BENZODIAZEPINE SCREEN, URINE: NEGATIVE
BILIRUB SERPL-MCNC: 0.5 MG/DL (ref 0–1)
BILIRUBIN URINE: NEGATIVE MG/DL
BLOOD, URINE: NEGATIVE
BUN BLDV-MCNC: 16 MG/DL (ref 6–23)
CALCIUM SERPL-MCNC: 9.6 MG/DL (ref 8.3–10.6)
CANNABINOID SCREEN URINE: ABNORMAL
CHLORIDE BLD-SCNC: 103 MMOL/L (ref 99–110)
CLARITY: ABNORMAL
CO2: 19 MMOL/L (ref 21–32)
COCAINE METABOLITE: NEGATIVE
COLOR: YELLOW
CREAT SERPL-MCNC: 0.7 MG/DL (ref 0.6–1.1)
DIFFERENTIAL TYPE: ABNORMAL
EOSINOPHILS ABSOLUTE: 0 K/CU MM
EOSINOPHILS RELATIVE PERCENT: 0 % (ref 0–3)
GFR AFRICAN AMERICAN: >60 ML/MIN/1.73M2
GFR NON-AFRICAN AMERICAN: >60 ML/MIN/1.73M2
GLUCOSE BLD-MCNC: 135 MG/DL (ref 70–99)
GLUCOSE, URINE: NEGATIVE MG/DL
HCG QUALITATIVE: NEGATIVE
HCT VFR BLD CALC: 42.8 % (ref 37–47)
HEMOGLOBIN: 14.9 GM/DL (ref 12.5–16)
IMMATURE NEUTROPHIL %: 0.3 % (ref 0–0.43)
KETONES, URINE: 40 MG/DL
LEUKOCYTE ESTERASE, URINE: ABNORMAL
LIPASE: 30 IU/L (ref 13–60)
LYMPHOCYTES ABSOLUTE: 0.6 K/CU MM
LYMPHOCYTES RELATIVE PERCENT: 4.2 % (ref 24–44)
MAGNESIUM: 1.7 MG/DL (ref 1.8–2.4)
MCH RBC QN AUTO: 32 PG (ref 27–31)
MCHC RBC AUTO-ENTMCNC: 34.8 % (ref 32–36)
MCV RBC AUTO: 91.8 FL (ref 78–100)
MONOCYTES ABSOLUTE: 0.3 K/CU MM
MONOCYTES RELATIVE PERCENT: 1.8 % (ref 0–4)
MUCUS: ABNORMAL HPF
NITRITE URINE, QUANTITATIVE: NEGATIVE
NUCLEATED RBC %: 0 %
OPIATES, URINE: NEGATIVE
OXYCODONE: NEGATIVE
PDW BLD-RTO: 12.4 % (ref 11.7–14.9)
PH, URINE: 8.5 (ref 5–8)
PHENCYCLIDINE, URINE: NEGATIVE
PLATELET # BLD: 230 K/CU MM (ref 140–440)
PMV BLD AUTO: 11.3 FL (ref 7.5–11.1)
POTASSIUM SERPL-SCNC: 3.6 MMOL/L (ref 3.5–5.1)
PROTEIN UA: 30 MG/DL
RBC # BLD: 4.66 M/CU MM (ref 4.2–5.4)
RBC URINE: 1 /HPF (ref 0–6)
SEGMENTED NEUTROPHILS ABSOLUTE COUNT: 12.9 K/CU MM
SEGMENTED NEUTROPHILS RELATIVE PERCENT: 93.6 % (ref 36–66)
SODIUM BLD-SCNC: 137 MMOL/L (ref 135–145)
SPECIFIC GRAVITY UA: 1.01 (ref 1–1.03)
SQUAMOUS EPITHELIAL: 11 /HPF
TOTAL CK: 112 IU/L (ref 26–140)
TOTAL IMMATURE NEUTOROPHIL: 0.04 K/CU MM
TOTAL NUCLEATED RBC: 0 K/CU MM
TOTAL PROTEIN: 7.6 GM/DL (ref 6.4–8.2)
TRICHOMONAS: ABNORMAL /HPF
UROBILINOGEN, URINE: 0.2 MG/DL (ref 0.2–1)
WBC # BLD: 13.8 K/CU MM (ref 4–10.5)
WBC UA: 1 /HPF (ref 0–5)

## 2022-09-14 PROCEDURE — 82550 ASSAY OF CK (CPK): CPT

## 2022-09-14 PROCEDURE — 2580000003 HC RX 258: Performed by: PHYSICIAN ASSISTANT

## 2022-09-14 PROCEDURE — 80053 COMPREHEN METABOLIC PANEL: CPT

## 2022-09-14 PROCEDURE — 93005 ELECTROCARDIOGRAM TRACING: CPT | Performed by: PHYSICIAN ASSISTANT

## 2022-09-14 PROCEDURE — 99284 EMERGENCY DEPT VISIT MOD MDM: CPT

## 2022-09-14 PROCEDURE — 96375 TX/PRO/DX INJ NEW DRUG ADDON: CPT

## 2022-09-14 PROCEDURE — 83690 ASSAY OF LIPASE: CPT

## 2022-09-14 PROCEDURE — 80307 DRUG TEST PRSMV CHEM ANLYZR: CPT

## 2022-09-14 PROCEDURE — 84703 CHORIONIC GONADOTROPIN ASSAY: CPT

## 2022-09-14 PROCEDURE — 96365 THER/PROPH/DIAG IV INF INIT: CPT

## 2022-09-14 PROCEDURE — 81001 URINALYSIS AUTO W/SCOPE: CPT

## 2022-09-14 PROCEDURE — 6360000002 HC RX W HCPCS: Performed by: PHYSICIAN ASSISTANT

## 2022-09-14 PROCEDURE — 83735 ASSAY OF MAGNESIUM: CPT

## 2022-09-14 PROCEDURE — 85025 COMPLETE CBC W/AUTO DIFF WBC: CPT

## 2022-09-14 RX ORDER — KETOROLAC TROMETHAMINE 30 MG/ML
30 INJECTION, SOLUTION INTRAMUSCULAR; INTRAVENOUS ONCE
Status: COMPLETED | OUTPATIENT
Start: 2022-09-14 | End: 2022-09-14

## 2022-09-14 RX ORDER — MAGNESIUM SULFATE 1 G/100ML
1000 INJECTION INTRAVENOUS ONCE
Status: COMPLETED | OUTPATIENT
Start: 2022-09-14 | End: 2022-09-14

## 2022-09-14 RX ORDER — ONDANSETRON 2 MG/ML
4 INJECTION INTRAMUSCULAR; INTRAVENOUS EVERY 30 MIN PRN
Status: DISCONTINUED | OUTPATIENT
Start: 2022-09-14 | End: 2022-09-14 | Stop reason: HOSPADM

## 2022-09-14 RX ORDER — 0.9 % SODIUM CHLORIDE 0.9 %
1000 INTRAVENOUS SOLUTION INTRAVENOUS ONCE
Status: COMPLETED | OUTPATIENT
Start: 2022-09-14 | End: 2022-09-14

## 2022-09-14 RX ORDER — LORAZEPAM 2 MG/ML
1 INJECTION INTRAMUSCULAR ONCE
Status: DISCONTINUED | OUTPATIENT
Start: 2022-09-14 | End: 2022-09-14 | Stop reason: HOSPADM

## 2022-09-14 RX ADMIN — SODIUM CHLORIDE 1000 ML: 9 INJECTION, SOLUTION INTRAVENOUS at 16:52

## 2022-09-14 RX ADMIN — ONDANSETRON 4 MG: 2 INJECTION INTRAMUSCULAR; INTRAVENOUS at 16:56

## 2022-09-14 RX ADMIN — KETOROLAC TROMETHAMINE 30 MG: 30 INJECTION, SOLUTION INTRAMUSCULAR; INTRAVENOUS at 16:55

## 2022-09-14 RX ADMIN — MAGNESIUM SULFATE IN DEXTROSE 1000 MG: 10 INJECTION, SOLUTION INTRAVENOUS at 16:55

## 2022-09-14 NOTE — ED NOTES
Pt informed not to climb in and out of bed d/t IV fluids.  Pt agreed she would not get out of bed unless RN staff is notified first     Shyanne Spence RN  09/14/22 [phone]

## 2022-09-14 NOTE — ED PROVIDER NOTES
eMERGENCY dEPARTMENT eNCOUnter         [facility] EMERGENCY DEPARTMENT     PCP: Jenny Thibodeaux MD    [facility]    Chief Complaint   Patient presents with    Emesis     Pt CVC- diagnosed with it in the past- denies that is the issue today. Vomiting since 3am- uncooperative with EMS- VSS- IV left AC zofran given       HPI    Jone Walters is a 32 y.o. female who presents via EMS from home for nausea vomiting that began 3 AM this morning. Was given IV Zofran by EMS but was reportedly very uncooperative. Patient has a history of cyclical vomiting syndrome and symptoms today feel similar. She states she is very weak and \"numb and tingling in her arms and legs. \"  She does admit to marijuana use but states \"they told me my cyclic vomiting is not from a marijuana use because they are. I do not smoke and still have similar symptoms. \"  No concern for pregnancy. No diarrhea. No fever or chills. No new foods medications or antibiotics. She stating \"I feel like I need to stay in the hospital and be admitted. \"  No chest pain or shortness of breath. No urinary symptoms. REVIEW OF SYSTEMS    Constitutional:  Denies fever, chills, weight loss or weakness   HENT:  Denies sore throat or ear pain   Cardiovascular:  Denies chest pain, palpitations or swelling   Respiratory:  Denies cough or shortness of breath   GI:  See HPI above  : No hematuria or dysuria. No vaginal symptoms. Musculoskeletal:  Denies back pain or groin pain or masses. No pain or swelling of extremities.   Skin:  Denies rash  Neurologic:  Denies headache, focal weakness or sensory changes   Endocrine:  Denies polyuria or polydypsia   Lymphatic:  Denies swollen glands     All other review of systems are negative  See HPI and nursing notes for additional information     PAST MEDICAL & SURGICAL HISTORY    Past Medical History:   Diagnosis Date    Patient denies medical problems      Past Surgical History: Procedure Laterality Date    TONSILLECTOMY         CURRENT MEDICATIONS    Current Outpatient Rx   Medication Sig Dispense Refill    divalproex (DEPAKOTE ER) 250 MG extended release tablet Take 3 tablets by mouth in the morning. Stop and call if you get pregnant. . 90 tablet 0    pantoprazole (PROTONIX) 40 MG tablet Take 1 tablet by mouth every morning (before breakfast) 90 tablet 1    buPROPion (WELLBUTRIN SR) 150 MG extended release tablet Take 1 tablet by mouth 2 times daily 60 tablet 0    sucralfate (CARAFATE) 1 GM tablet Take 1 tablet by mouth 4 times daily as needed (Patient not taking: Reported on 7/6/2022)      promethazine (PHENERGAN) 25 MG tablet Take 1 tablet by mouth every 6 hours as needed for Nausea 30 tablet 1    ondansetron (ZOFRAN) 4 MG tablet Take 1 tablet by mouth every 8 hours as needed for Nausea (Patient not taking: Reported on 7/6/2022) 10 tablet 0       ALLERGIES    Allergies   Allergen Reactions    Dust Mite Extract     Fish-Derived Products        SOCIAL AND FAMILY HISTORY    Social History     Socioeconomic History    Marital status:    Tobacco Use    Smoking status: Every Day     Packs/day: 0.50     Years: 1.00     Pack years: 0.50     Types: Cigarettes    Smokeless tobacco: Never   Vaping Use    Vaping Use: Some days   Substance and Sexual Activity    Alcohol use: No    Drug use: Yes     Types: Marijuana Payton Peace)     Comment: occ    Sexual activity: Yes     Family History   Adopted: Yes       PHYSICAL EXAM    VITAL SIGNS: /71   Pulse 74   Temp 98.4 °F (36.9 °C) (Oral)   Resp 16   SpO2 99%   General:  Well developed, well nourished, In no acute distress  Eyes:  Sclera nonicteric, Conjunctiva moist, No discharge  Head:  Normocephalic, Atramautic  Neck/Lymphatics: Supple, no JVD, no swollen nodes  Respiratory:  Clear to ausculation bilaterally, No retractions, Non labored breathing  Cardiovascular:  RRR, No murmurs/gallops/thrills  GI:   No gross discoloration.   Bowel sounds Patient placed onto IV fluids, Toradol and Zofran here. Labs are ordered and pending at time of this dictation. CBC with leukocytosis of 13.8 with left shift. Normal hemoglobin. CMP without significant derangement or electrolyte disturbance. Normal renal function. UDS is positive for cannabinoids. Magnesium 1.7. Negative serum pregnancy. Normal lipase and CK. Pending at time of dictation, still awaiting UA results, do suspect cyclical vomiting secondary to persistent marijuana use without evidence of severe electrolyte disturbance or dehydration. Patient still awaiting medications to be administered for reassessment by do anticipate discharge home if symptomatically improved. 9/14/2022 1630 EDT I have signed out [facility] Emergency Department care to [facility]. We discussed the history, physical exam, completed/pending test results (if obtained) and current treatment plan. Please refer to his/her chart for the patients further details, remaining Emergency Department course, final disposition and diagnosis. Comment: Please note this report has been produced using speech recognition software and may contain errors related to that system including errors in grammar, punctuation, and spelling, as well as words and phrases that may be inappropriate. If there are any questions or concerns please feel free to contact the dictating provider for clarification.           Abiel Moyer PA-C  09/14/22 9198

## 2022-09-14 NOTE — ED PROVIDER NOTES
Emergency Department Encounter  Location: [address] [place] [address] EMERGENCY DEPARTMENT    Patient: Richie Hunter  MRN: [id number]  : 1995  Date of evaluation: 2022  ED Provider: Nan Wang, Gracious Eloise  Richie Hunter was checked out to me by TERESA Dahl. Please see his/her initial documentation for details of the patient's initial ED presentation, physical exam and completed studies. In brief, Richie Hunter is a 32 y.o. female that presented to the emergency department for nausea, vomitting abdominal pain x1 day. Hx of cannibus use. Bmp looks good. Ua shows cannabus. Pending pending ua. Has not been treated with meds yet. GENERAL APPEARANCE: Awake and alert. Cooperative. No acute distress. HEAD: Normocephalic. Atraumatic. EYES: EOM's grossly intact. Sclera anicteric. ENT: Mucous membranes are moist. Tolerates saliva. No trismus. NECK: full arom. HEART: RRR. Radial pulses 2+. LUNGS: Respirations unlabored. CTAB  ABDOMEN: Soft. Non-tender. No guarding or rebound. EXTREMITIES: No acute deformities. SKIN: Warm and dry. NEUROLOGICAL: No gross facial drooping. Moves all 4 extremities spontaneously. PSYCHIATRIC: Normal mood.           I have reviewed and interpreted all of the currently available lab results and diagnostics from this visit:  Results for orders placed or performed during the hospital encounter of 22   CBC with Auto Differential   Result Value Ref Range    WBC 13.8 (H) 4.0 - 10.5 K/CU MM    RBC 4.66 4.2 - 5.4 M/CU MM    Hemoglobin 14.9 12.5 - 16.0 GM/DL    Hematocrit 42.8 37 - 47 %    MCV 91.8 78 - 100 FL    MCH 32.0 (H) 27 - 31 PG    MCHC 34.8 32.0 - 36.0 %    RDW 12.4 11.7 - 14.9 %    Platelets 508 391 - 376 K/CU MM    MPV 11.3 (H) 7.5 - 11.1 FL    Differential Type AUTOMATED DIFFERENTIAL     Segs Relative 93.6 (H) 36 - 66 %    Lymphocytes % 4.2 (L) 24 - 44 %    Monocytes % 1.8 0 - 4 %    Eosinophils % 0.0 0 - 3 %    Basophils % 0.1 0 - 1 %    Segs Absolute 12.9 K/CU MM    Lymphocytes Absolute 0.6 K/CU MM    Monocytes Absolute 0.3 K/CU MM    Eosinophils Absolute 0.0 K/CU MM    Basophils Absolute 0.0 K/CU MM    Nucleated RBC % 0.0 %    Total Nucleated RBC 0.0 K/CU MM    Total Immature Neutrophil 0.04 K/CU MM    Immature Neutrophil % 0.3 0 - 0.43 %   Comprehensive Metabolic Panel   Result Value Ref Range    Sodium 137 135 - 145 MMOL/L    Potassium 3.6 3.5 - 5.1 MMOL/L    Chloride 103 99 - 110 mMol/L    CO2 19 (L) 21 - 32 MMOL/L    BUN 16 6 - 23 MG/DL    Creatinine 0.7 0.6 - 1.1 MG/DL    Glucose 135 (H) 70 - 99 MG/DL    Calcium 9.6 8.3 - 10.6 MG/DL    Albumin 4.9 3.4 - 5.0 GM/DL    Total Protein 7.6 6.4 - 8.2 GM/DL    Total Bilirubin 0.5 0.0 - 1.0 MG/DL    ALT 22 10 - 40 U/L    AST 20 15 - 37 IU/L    Alkaline Phosphatase 81 40 - 129 IU/L    GFR Non-African American >60 >60 mL/min/1.73m2    GFR African American >60 >60 mL/min/1.73m2    Anion Gap 15 4 - 16   HCG Serum, Qualitative   Result Value Ref Range    hCG Qual NEGATIVE    Urinalysis with Microscopic   Result Value Ref Range    Color, UA YELLOW YELLOW    Clarity, UA SLIGHTLY CLOUDY (A) CLEAR    Glucose, Urine NEGATIVE NEGATIVE MG/DL    Bilirubin Urine NEGATIVE NEGATIVE MG/DL    Ketones, Urine 40 (A) NEGATIVE MG/DL    Specific Gravity, UA 1.010 1.001 - 1.035    Blood, Urine NEGATIVE NEGATIVE    pH, Urine 8.5 (HH) 5.0 - 8.0    Protein, UA 30 (A) NEGATIVE MG/DL    Urobilinogen, Urine 0.2 0.2 - 1.0 MG/DL    Nitrite Urine, Quantitative NEGATIVE NEGATIVE    Leukocyte Esterase, Urine MODERATE (A) NEGATIVE    RBC, UA 1 0 - 6 /HPF    WBC, UA 1 0 - 5 /HPF    Bacteria, UA MANY (A) NEGATIVE /HPF    Squam Epithel, UA 11 /HPF    Mucus, UA RARE (A) NEGATIVE HPF    Trichomonas, UA NONE SEEN NONE SEEN /HPF   Magnesium   Result Value Ref Range    Magnesium 1.7 (L) 1.8 - 2.4 mg/dl   CK   Result Value Ref Range    Total  26 - 140 IU/L   Urine Drug Screen   Result Value Ref Range    Cannabinoid Scrn, Ur UNCONFIRMED POSITIVE (A) NEGATIVE    Amphetamines NEGATIVE NEGATIVE    Cocaine Metabolite NEGATIVE NEGATIVE    Benzodiazepine Screen, Urine NEGATIVE NEGATIVE    Barbiturate Screen, Ur NEGATIVE NEGATIVE    Opiates, Urine NEGATIVE NEGATIVE    Phencyclidine, Urine NEGATIVE NEGATIVE    Oxycodone NEGATIVE NEGATIVE   Lipase   Result Value Ref Range    Lipase 30 13 - 60 IU/L   EKG 12 Lead   Result Value Ref Range    Ventricular Rate 75 BPM    Atrial Rate 75 BPM    P-R Interval 142 ms    QRS Duration 104 ms    Q-T Interval 448 ms    QTc Calculation (Bazett) 500 ms    P Axis 76 degrees    R Axis 78 degrees    T Axis 71 degrees    Diagnosis       Normal sinus rhythm  Prolonged QT  Abnormal ECG  When compared with ECG of 27-MAY-2022 09:29,  No significant change was found       No results found. Final ED Course and MDM: In brief, Kendra Cronin is a 32 y.o. female whose care was signed out to me by the outgoing provider. In brief, patient has a history of hyperemesis cannabis induced. Here in the emergency department nausea vomiting abdominal pain. Abdominal exam is benign. Nonsurgical nonacute. Labs do reveal no acute abnormality aside from hypomagnesemia. Patient is repleted with this. IV fluids analgesics antiemetics were provided. However patient gets very upset. And leaves AGAINST MEDICAL ADVICE.   Prior to me being able to educate her on risk and benefits of discharge prior to her work-up being completed here in the emergency department    ED Medication Orders (From admission, onward)      Start Ordered     Status Ordering Provider    09/14/22 1630 09/14/22 1627  magnesium sulfate 1000 mg in dextrose 5% 100 mL IVPB  ONCE         Last MAR action: Stopped - by Rohan Blades on 09/14/22 at [address]    09/14/22 1600 09/14/22 1551  0.9 % sodium chloride bolus  ONCE         Last MAR action: Stopped - by Rohan Blades on 09/14/22 at [address]    09/14/22 1600 09/14/22 1553 ketorolac (TORADOL) injection 30 mg  ONCE         Last MAR action: Given - by AMADO BOSS on 09/14/22 at [address]            Final Impression      1. Non-intractable vomiting with nausea, unspecified vomiting type    2.  Hypomagnesemia        DISPOSITION Pace 09/14/2022 05:34:42 PM     (Please note that portions of this note may have been completed with a voice recognition program. Efforts were made to edit the dictations but occasionally words are mis-transcribed.)    Julia Camp PA-C  [place] [address] [place] [address]  09/15/22 1126

## 2022-09-14 NOTE — ED NOTES
Pt upset that she was not getting help. Pt had spoke to 2 providers and charge nurse and was still unsatisfied with care. Pt had not vomited since arrival and had been drinking water.  Pt was also talking to other patients and visitors about not coming to this hospital.      Leah Palmer RN  09/14/22 1588

## 2022-09-15 DIAGNOSIS — F17.200 SMOKER: ICD-10-CM

## 2022-09-15 DIAGNOSIS — F31.9 BIPOLAR 1 DISORDER (HCC): ICD-10-CM

## 2022-09-15 LAB
EKG ATRIAL RATE: 75 BPM
EKG DIAGNOSIS: NORMAL
EKG P AXIS: 76 DEGREES
EKG P-R INTERVAL: 142 MS
EKG Q-T INTERVAL: 448 MS
EKG QRS DURATION: 104 MS
EKG QTC CALCULATION (BAZETT): 500 MS
EKG R AXIS: 78 DEGREES
EKG T AXIS: 71 DEGREES
EKG VENTRICULAR RATE: 75 BPM

## 2022-09-15 PROCEDURE — 93010 ELECTROCARDIOGRAM REPORT: CPT | Performed by: INTERNAL MEDICINE

## 2022-09-16 ENCOUNTER — HOSPITAL ENCOUNTER (EMERGENCY)
Age: 27
Discharge: HOME | End: 2022-09-16
Attending: EMERGENCY MEDICINE
Payer: MEDICARE

## 2022-09-16 VITALS
TEMPERATURE: 98 F | HEIGHT: 69 IN | RESPIRATION RATE: 16 BRPM | SYSTOLIC BLOOD PRESSURE: 139 MMHG | BODY MASS INDEX: 20.73 KG/M2 | DIASTOLIC BLOOD PRESSURE: 100 MMHG | HEART RATE: 92 BPM | OXYGEN SATURATION: 96 % | WEIGHT: 140 LBS

## 2022-09-16 DIAGNOSIS — R11.15 CYCLICAL VOMITING: Primary | ICD-10-CM

## 2022-09-16 LAB
ALBUMIN SERPL-MCNC: 4.8 GM/DL (ref 3.4–5)
ALCOHOL SCREEN SERUM: <0.01 %WT/VOL
ALP BLD-CCNC: 85 IU/L (ref 40–129)
ALT SERPL-CCNC: 26 U/L (ref 10–40)
ANION GAP SERPL CALCULATED.3IONS-SCNC: 18 MMOL/L (ref 4–16)
AST SERPL-CCNC: 24 IU/L (ref 15–37)
BASOPHILS ABSOLUTE: 0 K/CU MM
BASOPHILS RELATIVE PERCENT: 0.2 % (ref 0–1)
BILIRUB SERPL-MCNC: 1.1 MG/DL (ref 0–1)
BUN BLDV-MCNC: 13 MG/DL (ref 6–23)
CALCIUM SERPL-MCNC: 9 MG/DL (ref 8.3–10.6)
CHLORIDE BLD-SCNC: 102 MMOL/L (ref 99–110)
CO2: 19 MMOL/L (ref 21–32)
CREAT SERPL-MCNC: 0.7 MG/DL (ref 0.6–1.1)
DIFFERENTIAL TYPE: ABNORMAL
EOSINOPHILS ABSOLUTE: 0 K/CU MM
EOSINOPHILS RELATIVE PERCENT: 0 % (ref 0–3)
GFR AFRICAN AMERICAN: >60 ML/MIN/1.73M2
GFR NON-AFRICAN AMERICAN: >60 ML/MIN/1.73M2
GLUCOSE BLD-MCNC: 126 MG/DL (ref 70–99)
HCG QUALITATIVE: NEGATIVE
HCT VFR BLD CALC: 44.7 % (ref 37–47)
HEMOGLOBIN: 15.6 GM/DL (ref 12.5–16)
IMMATURE NEUTROPHIL %: 0.3 % (ref 0–0.43)
LIPASE: 35 IU/L (ref 13–60)
LYMPHOCYTES ABSOLUTE: 2.2 K/CU MM
LYMPHOCYTES RELATIVE PERCENT: 16.7 % (ref 24–44)
MAGNESIUM: 2.2 MG/DL (ref 1.8–2.4)
MCH RBC QN AUTO: 31.6 PG (ref 27–31)
MCHC RBC AUTO-ENTMCNC: 34.9 % (ref 32–36)
MCV RBC AUTO: 90.7 FL (ref 78–100)
MONOCYTES ABSOLUTE: 0.5 K/CU MM
MONOCYTES RELATIVE PERCENT: 4 % (ref 0–4)
PDW BLD-RTO: 12.6 % (ref 11.7–14.9)
PLATELET # BLD: 254 K/CU MM (ref 140–440)
PMV BLD AUTO: 11.3 FL (ref 7.5–11.1)
POTASSIUM SERPL-SCNC: 3.3 MMOL/L (ref 3.5–5.1)
RBC # BLD: 4.93 M/CU MM (ref 4.2–5.4)
SEGMENTED NEUTROPHILS ABSOLUTE COUNT: 10.4 K/CU MM
SEGMENTED NEUTROPHILS RELATIVE PERCENT: 78.8 % (ref 36–66)
SODIUM BLD-SCNC: 139 MMOL/L (ref 135–145)
TOTAL IMMATURE NEUTOROPHIL: 0.04 K/CU MM
TOTAL PROTEIN: 7.3 GM/DL (ref 6.4–8.2)
WBC # BLD: 13.2 K/CU MM (ref 4–10.5)

## 2022-09-16 PROCEDURE — 96374 THER/PROPH/DIAG INJ IV PUSH: CPT

## 2022-09-16 PROCEDURE — 6360000002 HC RX W HCPCS: Performed by: EMERGENCY MEDICINE

## 2022-09-16 PROCEDURE — 80053 COMPREHEN METABOLIC PANEL: CPT

## 2022-09-16 PROCEDURE — G0480 DRUG TEST DEF 1-7 CLASSES: HCPCS

## 2022-09-16 PROCEDURE — 99284 EMERGENCY DEPT VISIT MOD MDM: CPT

## 2022-09-16 PROCEDURE — 83690 ASSAY OF LIPASE: CPT

## 2022-09-16 PROCEDURE — 2580000003 HC RX 258: Performed by: EMERGENCY MEDICINE

## 2022-09-16 PROCEDURE — 84703 CHORIONIC GONADOTROPIN ASSAY: CPT

## 2022-09-16 PROCEDURE — 96372 THER/PROPH/DIAG INJ SC/IM: CPT

## 2022-09-16 PROCEDURE — 6370000000 HC RX 637 (ALT 250 FOR IP): Performed by: EMERGENCY MEDICINE

## 2022-09-16 PROCEDURE — 85025 COMPLETE CBC W/AUTO DIFF WBC: CPT

## 2022-09-16 PROCEDURE — 83735 ASSAY OF MAGNESIUM: CPT

## 2022-09-16 RX ORDER — LIDOCAINE HYDROCHLORIDE 20 MG/ML
15 SOLUTION OROPHARYNGEAL ONCE
Status: COMPLETED | OUTPATIENT
Start: 2022-09-16 | End: 2022-09-16

## 2022-09-16 RX ORDER — LORAZEPAM 2 MG/ML
1 INJECTION INTRAMUSCULAR ONCE
Status: DISCONTINUED | OUTPATIENT
Start: 2022-09-16 | End: 2022-09-16

## 2022-09-16 RX ORDER — BUPROPION HYDROCHLORIDE 150 MG/1
TABLET, EXTENDED RELEASE ORAL
Qty: 60 TABLET | Refills: 0 | Status: SHIPPED | OUTPATIENT
Start: 2022-09-16 | End: 2022-10-20

## 2022-09-16 RX ORDER — LORAZEPAM 1 MG/1
1 TABLET ORAL ONCE
Status: COMPLETED | OUTPATIENT
Start: 2022-09-16 | End: 2022-09-16

## 2022-09-16 RX ORDER — PROMETHAZINE HYDROCHLORIDE 25 MG/1
25 SUPPOSITORY RECTAL EVERY 6 HOURS PRN
Qty: 12 SUPPOSITORY | Refills: 0 | Status: SHIPPED | OUTPATIENT
Start: 2022-09-16 | End: 2022-09-23

## 2022-09-16 RX ORDER — MAGNESIUM HYDROXIDE/ALUMINUM HYDROXICE/SIMETHICONE 120; 1200; 1200 MG/30ML; MG/30ML; MG/30ML
30 SUSPENSION ORAL ONCE
Status: COMPLETED | OUTPATIENT
Start: 2022-09-16 | End: 2022-09-16

## 2022-09-16 RX ORDER — POTASSIUM BICARBONATE 25 MEQ/1
25 TABLET, EFFERVESCENT ORAL ONCE
Status: DISCONTINUED | OUTPATIENT
Start: 2022-09-16 | End: 2022-09-16

## 2022-09-16 RX ORDER — HALOPERIDOL 5 MG/ML
5 INJECTION INTRAMUSCULAR ONCE
Status: COMPLETED | OUTPATIENT
Start: 2022-09-16 | End: 2022-09-16

## 2022-09-16 RX ORDER — DIPHENHYDRAMINE HYDROCHLORIDE 50 MG/ML
50 INJECTION INTRAMUSCULAR; INTRAVENOUS ONCE
Status: COMPLETED | OUTPATIENT
Start: 2022-09-16 | End: 2022-09-16

## 2022-09-16 RX ORDER — 0.9 % SODIUM CHLORIDE 0.9 %
1000 INTRAVENOUS SOLUTION INTRAVENOUS ONCE
Status: COMPLETED | OUTPATIENT
Start: 2022-09-16 | End: 2022-09-16

## 2022-09-16 RX ORDER — DICYCLOMINE HYDROCHLORIDE 10 MG/ML
20 INJECTION INTRAMUSCULAR ONCE
Status: COMPLETED | OUTPATIENT
Start: 2022-09-16 | End: 2022-09-16

## 2022-09-16 RX ADMIN — DICYCLOMINE HYDROCHLORIDE 20 MG: 20 INJECTION, SOLUTION INTRAMUSCULAR at 10:42

## 2022-09-16 RX ADMIN — ALUMINUM HYDROXIDE, MAGNESIUM HYDROXIDE, AND SIMETHICONE 30 ML: 200; 200; 20 SUSPENSION ORAL at 09:48

## 2022-09-16 RX ADMIN — HALOPERIDOL LACTATE 5 MG: 5 INJECTION, SOLUTION INTRAMUSCULAR at 08:32

## 2022-09-16 RX ADMIN — POTASSIUM BICARBONATE 20 MEQ: 782 TABLET, EFFERVESCENT ORAL at 09:48

## 2022-09-16 RX ADMIN — LORAZEPAM 1 MG: 1 TABLET ORAL at 10:42

## 2022-09-16 RX ADMIN — SODIUM CHLORIDE 1000 ML: 9 INJECTION, SOLUTION INTRAVENOUS at 08:30

## 2022-09-16 RX ADMIN — DIPHENHYDRAMINE HYDROCHLORIDE 50 MG: 50 INJECTION, SOLUTION INTRAMUSCULAR; INTRAVENOUS at 08:32

## 2022-09-16 RX ADMIN — LIDOCAINE HYDROCHLORIDE 15 ML: 20 SOLUTION ORAL; TOPICAL at 09:49

## 2022-09-16 ASSESSMENT — PAIN DESCRIPTION - LOCATION: LOCATION: ABDOMEN

## 2022-09-16 ASSESSMENT — PAIN - FUNCTIONAL ASSESSMENT: PAIN_FUNCTIONAL_ASSESSMENT: NONE - DENIES PAIN

## 2022-09-16 ASSESSMENT — PAIN DESCRIPTION - DESCRIPTORS: DESCRIPTORS: SHARP

## 2022-09-16 ASSESSMENT — PAIN SCALES - GENERAL
PAINLEVEL_OUTOF10: 8
PAINLEVEL_OUTOF10: 0
PAINLEVEL_OUTOF10: 0

## 2022-09-16 NOTE — ED PROVIDER NOTES
Triage Chief Complaint:   Emesis and Abdominal Pain    [place]:  Cruzito Kirk is a 32 y.o. female that presents with persistent nausea and vomiting. Patient was in baseline state of health until last 3 days when the above started. Patient reports vomiting has been intractable despite several ED visits. Patient does report that this vomiting is a recurrent problem and does not know what causes it. Patient does continue to smoke marijuana and does not believe that this is the issue. Abdominal pain is mid abdomen, currently 8 of 10 and constant. Pain is worse with vomiting. Patient does report some associated diarrhea as well. Patient has had a tubal ligation. No other abdominal surgeries. ROS:  General:  No fevers, no chills, no weakness  Eyes:  No recent vison changes, no discharge  ENT:  No sore throat, no nasal congestion, no hearing changes  Cardiovascular:  No chest pain, no palpitations  Respiratory:  No shortness of breath, no cough, no wheezing  Gastrointestinal:  + pain, + nausea, + vomiting, + diarrhea  Musculoskeletal:  No muscle pain, no joint pain  Skin:  No rash, no pruritis, no easy bruising  Neurologic:  No speech problems, no headache, no extremity numbness, no extremity tingling, no extremity weakness  Psychiatric:  No anxiety  Genitourinary:  No dysuria, no hematuria  Endocrine:  No unexpected weight gain, no unexpected weight loss  Extremities:  no edema, no pain    Past Medical History:   Diagnosis Date    Patient denies medical problems      Past Surgical History:   Procedure Laterality Date    TONSILLECTOMY       Family History   Adopted: Yes     Social History     Socioeconomic History    Marital status:       Spouse name: Not on file    Number of children: Not on file    Years of education: Not on file    Highest education level: Not on file   Occupational History    Not on file   Tobacco Use    Smoking status: Every Day     Packs/day: 0.50     Years: 1.00     Pack years: 0.50     Types: Cigarettes    Smokeless tobacco: Never   Vaping Use    Vaping Use: Some days   Substance and Sexual Activity    Alcohol use: No    Drug use: Not Currently     Types: Marijuana Anahi Bell)     Comment: occ    Sexual activity: Yes   Other Topics Concern    Not on file   Social History Narrative    Not on file     Social Determinants of Health     Financial Resource Strain: Not on file   Food Insecurity: Not on file   Transportation Needs: Not on file   Physical Activity: Not on file   Stress: Not on file   Social Connections: Not on file   Intimate Partner Violence: Not on file   Housing Stability: Not on file     Current Facility-Administered Medications   Medication Dose Route Frequency Provider Last Rate Last Admin    haloperidol lactate (HALDOL) injection 5 mg  5 mg IntraMUSCular Once Shakeel Flatter, MD        diphenhydrAMINE (BENADRYL) injection 50 mg  50 mg IntraVENous Once Shakeel Flatter, MD        0.9 % sodium chloride bolus  1,000 mL IntraVENous Once Shakeel FlatMD new         Current Outpatient Medications   Medication Sig Dispense Refill    buPROPion (WELLBUTRIN SR) 150 MG extended release tablet TAKE 1 TABLET BY MOUTH TWICE A DAY 60 tablet 0    divalproex (DEPAKOTE ER) 250 MG extended release tablet Take 3 tablets by mouth in the morning. Stop and call if you get pregnant. . 90 tablet 0     Allergies   Allergen Reactions    Dust Mite Extract     Fish-Derived Products        Nursing Notes Reviewed    Physical Exam:  ED Triage Vitals   Enc Vitals Group      BP 09/16/22 0815 137/83      Heart Rate 09/16/22 0815 84      Resp 09/16/22 0812 16      Temp --       Temp src --       SpO2 09/16/22 0815 100 %      Weight 09/16/22 0812 140 lb (63.5 kg)      Height 09/16/22 0812 5' 9\" (1.753 m)      Head Circumference --       Peak Flow --       Pain Score --       Pain Loc --       Pain Edu? --       Excl. in [address]? --        My pulse ox interpretation is - normal    General appearance: Appears anxious. Skin:  Warm. Dry.  Piloerection. Eye:  Extraocular movements intact. Ears, nose, mouth and throat:  Oral mucosa moist   Neck:  Trachea midline. Extremity:  No swelling. Normal ROM     Heart:  Regular rate and rhythm, normal S1 & S2, no extra heart sounds. Perfusion:  Intact   Respiratory:  Lungs clear to auscultation bilaterally. Respirations nonlabored. Abdominal:  Normal bowel sounds. Soft. Mild mid abdominal tenderness to palpation. No rebound or guarding. Noted with peritoneal signs. Non distended. Back:  No CVA tenderness to palpation     Neurological:  Alert and oriented times 3. No focal neuro deficits. Psychiatric: Slightly anxious. [place] thoughts. Good eye contact.     I have reviewed and interpreted all of the currently available lab results from this visit (if applicable):  Results for orders placed or performed during the hospital encounter of 09/16/22   CBC with Auto Differential   Result Value Ref Range    WBC 13.2 (H) 4.0 - 10.5 K/CU MM    RBC 4.93 4.2 - 5.4 M/CU MM    Hemoglobin 15.6 12.5 - 16.0 GM/DL    Hematocrit 44.7 37 - 47 %    MCV 90.7 78 - 100 FL    MCH 31.6 (H) 27 - 31 PG    MCHC 34.9 32.0 - 36.0 %    RDW 12.6 11.7 - 14.9 %    Platelets 266 594 - 442 K/CU MM    MPV 11.3 (H) 7.5 - 11.1 FL    Differential Type AUTOMATED DIFFERENTIAL     Segs Relative 78.8 (H) 36 - 66 %    Lymphocytes % 16.7 (L) 24 - 44 %    Monocytes % 4.0 0 - 4 %    Eosinophils % 0.0 0 - 3 %    Basophils % 0.2 0 - 1 %    Segs Absolute 10.4 K/CU MM    Lymphocytes Absolute 2.2 K/CU MM    Monocytes Absolute 0.5 K/CU MM    Eosinophils Absolute 0.0 K/CU MM    Basophils Absolute 0.0 K/CU MM    Immature Neutrophil % 0.3 0 - 0.43 %    Total Immature Neutrophil 0.04 K/CU MM   Comprehensive Metabolic Panel   Result Value Ref Range    Sodium 139 135 - 145 MMOL/L    Potassium 3.3 (L) 3.5 - 5.1 MMOL/L    Chloride 102 99 - 110 mMol/L    CO2 19 (L) 21 - 32 MMOL/L    BUN 13 6 - 23 MG/DL    Creatinine 0.7 0.6 - 1.1 MG/DL    Glucose 126 (H) 70 - 99 MG/DL    Calcium 9.0 8.3 - 10.6 MG/DL    Albumin 4.8 3.4 - 5.0 GM/DL    Total Protein 7.3 6.4 - 8.2 GM/DL    Total Bilirubin 1.1 (H) 0.0 - 1.0 MG/DL    ALT 26 10 - 40 U/L    AST 24 15 - 37 IU/L    Alkaline Phosphatase 85 40 - 129 IU/L    GFR Non-African American >60 >60 mL/min/1.73m2    GFR African American >60 >60 mL/min/1.73m2    Anion Gap 18 (H) 4 - 16   Lipase   Result Value Ref Range    Lipase 35 13 - 60 IU/L   Magnesium   Result Value Ref Range    Magnesium 2.2 1.8 - 2.4 mg/dl   HCG Serum, Qualitative   Result Value Ref Range    hCG Qual NEGATIVE    Ethanol   Result Value Ref Range    Alcohol Scrn <0.01 <0.01 %WT/VOL      Radiographs (if obtained):  [] The following radiograph was interpreted by myself in the absence of a radiologist:   [] Radiologist's Report Reviewed:  No orders to display         EKG (if obtained): (All EKG's are interpreted by myself in the absence of a cardiologist)    Chart review shows recent radiographs:  No results found. MDM:  Pt presents as above. Emergent conditions considered. Presentation prompted initial labs. IVs established and IV fluids, IV Benadryl and intramuscular Haldol are given. Patient's chart is reviewed and patient with 2 prior ED visits in the last few days. Patient has had reassuring work-up other than mild hypokalemia. Patient's visit yesterday did not include CT imaging which was reassuring other than some distal esophageal wall thickening consistent with possible esophagitis which does make sense with patient persistent vomiting. CBC is with mild stable leukocytosis. CMP is with mild hypokalemia which is actually uptrending from visit on the 14th. Is also mild anion gap metabolic acidosis. hCG negative. Lipase is not suggestive of pancreatitis. Alcohol level negative. Case was discussed with Milana Armstrong Se as this is a third visit. He agrees with the above laboratory diagnostics and treatment plan.   He COVID-19 pandemic. Clinical Impression:  1. Cyclical vomiting      Disposition referral (if applicable):  No follow-up provider specified. Disposition medications (if applicable):  New Prescriptions    No medications on file       Comment: Please note this report has been produced using speech recognition software and may contain errors related to that system including errors in grammar, punctuation, and spelling, as well as words and phrases that may be inappropriate. If there are any questions or concerns please feel free to contact the dictating provider for clarification.        Jenaro Givens MD  09/16/22 7753

## 2022-09-22 ENCOUNTER — APPOINTMENT (OUTPATIENT)
Dept: CT IMAGING | Age: 27
End: 2022-09-22
Payer: MEDICARE

## 2022-09-22 ENCOUNTER — HOSPITAL ENCOUNTER (EMERGENCY)
Age: 27
Discharge: HOME | End: 2022-09-22
Attending: EMERGENCY MEDICINE
Payer: MEDICARE

## 2022-09-22 VITALS
OXYGEN SATURATION: 94 % | TEMPERATURE: 98.6 F | HEART RATE: 68 BPM | WEIGHT: 140 LBS | HEIGHT: 69 IN | SYSTOLIC BLOOD PRESSURE: 144 MMHG | DIASTOLIC BLOOD PRESSURE: 99 MMHG | BODY MASS INDEX: 20.73 KG/M2 | RESPIRATION RATE: 16 BRPM

## 2022-09-22 DIAGNOSIS — R11.2 NON-INTRACTABLE VOMITING WITH NAUSEA, UNSPECIFIED VOMITING TYPE: Primary | ICD-10-CM

## 2022-09-22 DIAGNOSIS — E87.6 HYPOKALEMIA: ICD-10-CM

## 2022-09-22 DIAGNOSIS — K52.9 COLITIS: ICD-10-CM

## 2022-09-22 DIAGNOSIS — R10.84 GENERALIZED ABDOMINAL PAIN: ICD-10-CM

## 2022-09-22 LAB
ALBUMIN SERPL-MCNC: 4.7 GM/DL (ref 3.4–5)
ALP BLD-CCNC: 77 IU/L (ref 40–129)
ALT SERPL-CCNC: 26 U/L (ref 10–40)
ANION GAP SERPL CALCULATED.3IONS-SCNC: 14 MMOL/L (ref 4–16)
AST SERPL-CCNC: 21 IU/L (ref 15–37)
BACTERIA: ABNORMAL /HPF
BASOPHILS ABSOLUTE: 0 K/CU MM
BASOPHILS RELATIVE PERCENT: 0.2 % (ref 0–1)
BILIRUB SERPL-MCNC: 0.7 MG/DL (ref 0–1)
BILIRUBIN URINE: NEGATIVE MG/DL
BLOOD, URINE: ABNORMAL
BUN BLDV-MCNC: 13 MG/DL (ref 6–23)
CALCIUM SERPL-MCNC: 9.5 MG/DL (ref 8.3–10.6)
CAST TYPE: ABNORMAL /HPF
CHLORIDE BLD-SCNC: 100 MMOL/L (ref 99–110)
CLARITY: CLEAR
CO2: 25 MMOL/L (ref 21–32)
COLOR: YELLOW
CREAT SERPL-MCNC: 0.8 MG/DL (ref 0.6–1.1)
CRYSTAL TYPE: NEGATIVE /HPF
DIFFERENTIAL TYPE: ABNORMAL
EOSINOPHILS ABSOLUTE: 0 K/CU MM
EOSINOPHILS RELATIVE PERCENT: 0.1 % (ref 0–3)
EPITHELIAL CELLS, UA: 3 /HPF
GFR AFRICAN AMERICAN: >60 ML/MIN/1.73M2
GFR NON-AFRICAN AMERICAN: >60 ML/MIN/1.73M2
GLUCOSE BLD-MCNC: 142 MG/DL (ref 70–99)
GLUCOSE, URINE: NEGATIVE MG/DL
HCG QUALITATIVE: NEGATIVE
HCT VFR BLD CALC: 43.8 % (ref 37–47)
HEMOGLOBIN: 15 GM/DL (ref 12.5–16)
IMMATURE NEUTROPHIL %: 0.4 % (ref 0–0.43)
KETONES, URINE: ABNORMAL MG/DL
LEUKOCYTE ESTERASE, URINE: NEGATIVE
LIPASE: 49 IU/L (ref 13–60)
LYMPHOCYTES ABSOLUTE: 1 K/CU MM
LYMPHOCYTES RELATIVE PERCENT: 6 % (ref 24–44)
MAGNESIUM: 1.5 MG/DL (ref 1.8–2.4)
MCH RBC QN AUTO: 31.1 PG (ref 27–31)
MCHC RBC AUTO-ENTMCNC: 34.2 % (ref 32–36)
MCV RBC AUTO: 90.7 FL (ref 78–100)
MONOCYTES ABSOLUTE: 0.5 K/CU MM
MONOCYTES RELATIVE PERCENT: 3.3 % (ref 0–4)
NITRITE URINE, QUANTITATIVE: NEGATIVE
PDW BLD-RTO: 12.3 % (ref 11.7–14.9)
PH, URINE: 8 (ref 5–8)
PLATELET # BLD: 277 K/CU MM (ref 140–440)
PMV BLD AUTO: 11.2 FL (ref 7.5–11.1)
POTASSIUM SERPL-SCNC: 3.2 MMOL/L (ref 3.5–5.1)
PROTEIN UA: NEGATIVE MG/DL
RBC # BLD: 4.83 M/CU MM (ref 4.2–5.4)
RBC URINE: 1 /HPF (ref 0–6)
SEGMENTED NEUTROPHILS ABSOLUTE COUNT: 14.3 K/CU MM
SEGMENTED NEUTROPHILS RELATIVE PERCENT: 90 % (ref 36–66)
SODIUM BLD-SCNC: 139 MMOL/L (ref 135–145)
SPECIFIC GRAVITY UA: 1.01 (ref 1–1.03)
TOTAL IMMATURE NEUTOROPHIL: 0.07 K/CU MM
TOTAL PROTEIN: 7.6 GM/DL (ref 6.4–8.2)
UROBILINOGEN, URINE: 0.2 MG/DL (ref 0.2–1)
WBC # BLD: 15.9 K/CU MM (ref 4–10.5)
WBC UA: <0 /HPF (ref 0–5)

## 2022-09-22 PROCEDURE — 96374 THER/PROPH/DIAG INJ IV PUSH: CPT

## 2022-09-22 PROCEDURE — 83690 ASSAY OF LIPASE: CPT

## 2022-09-22 PROCEDURE — 83735 ASSAY OF MAGNESIUM: CPT

## 2022-09-22 PROCEDURE — 85025 COMPLETE CBC W/AUTO DIFF WBC: CPT

## 2022-09-22 PROCEDURE — 81001 URINALYSIS AUTO W/SCOPE: CPT

## 2022-09-22 PROCEDURE — 80053 COMPREHEN METABOLIC PANEL: CPT

## 2022-09-22 PROCEDURE — 99285 EMERGENCY DEPT VISIT HI MDM: CPT

## 2022-09-22 PROCEDURE — 74177 CT ABD & PELVIS W/CONTRAST: CPT

## 2022-09-22 PROCEDURE — C9113 INJ PANTOPRAZOLE SODIUM, VIA: HCPCS | Performed by: EMERGENCY MEDICINE

## 2022-09-22 PROCEDURE — 6360000004 HC RX CONTRAST MEDICATION: Performed by: EMERGENCY MEDICINE

## 2022-09-22 PROCEDURE — 96375 TX/PRO/DX INJ NEW DRUG ADDON: CPT

## 2022-09-22 PROCEDURE — 2580000003 HC RX 258: Performed by: EMERGENCY MEDICINE

## 2022-09-22 PROCEDURE — 84703 CHORIONIC GONADOTROPIN ASSAY: CPT

## 2022-09-22 PROCEDURE — 6360000002 HC RX W HCPCS: Performed by: EMERGENCY MEDICINE

## 2022-09-22 RX ORDER — DIPHENHYDRAMINE HYDROCHLORIDE 50 MG/ML
50 INJECTION INTRAMUSCULAR; INTRAVENOUS ONCE
Status: COMPLETED | OUTPATIENT
Start: 2022-09-22 | End: 2022-09-22

## 2022-09-22 RX ORDER — ONDANSETRON 2 MG/ML
4 INJECTION INTRAMUSCULAR; INTRAVENOUS ONCE
Status: COMPLETED | OUTPATIENT
Start: 2022-09-22 | End: 2022-09-22

## 2022-09-22 RX ORDER — PANTOPRAZOLE SODIUM 40 MG/1
40 TABLET, DELAYED RELEASE ORAL
Qty: 30 TABLET | Refills: 0 | Status: ON HOLD | OUTPATIENT
Start: 2022-09-22 | End: 2022-10-25 | Stop reason: SDUPTHER

## 2022-09-22 RX ORDER — CIPROFLOXACIN 500 MG/1
500 TABLET, FILM COATED ORAL 2 TIMES DAILY
Qty: 20 TABLET | Refills: 0 | Status: SHIPPED | OUTPATIENT
Start: 2022-09-22 | End: 2022-10-02

## 2022-09-22 RX ORDER — METOCLOPRAMIDE HYDROCHLORIDE 5 MG/ML
10 INJECTION INTRAMUSCULAR; INTRAVENOUS ONCE
Status: COMPLETED | OUTPATIENT
Start: 2022-09-22 | End: 2022-09-22

## 2022-09-22 RX ORDER — 0.9 % SODIUM CHLORIDE 0.9 %
1000 INTRAVENOUS SOLUTION INTRAVENOUS ONCE
Status: COMPLETED | OUTPATIENT
Start: 2022-09-22 | End: 2022-09-22

## 2022-09-22 RX ORDER — PANTOPRAZOLE SODIUM 40 MG/10ML
40 INJECTION, POWDER, LYOPHILIZED, FOR SOLUTION INTRAVENOUS ONCE
Status: COMPLETED | OUTPATIENT
Start: 2022-09-22 | End: 2022-09-22

## 2022-09-22 RX ORDER — METRONIDAZOLE 500 MG/1
500 TABLET ORAL 3 TIMES DAILY
Qty: 30 TABLET | Refills: 0 | Status: SHIPPED | OUTPATIENT
Start: 2022-09-22 | End: 2022-10-02

## 2022-09-22 RX ADMIN — IOPAMIDOL 75 ML: 755 INJECTION, SOLUTION INTRAVENOUS at 17:26

## 2022-09-22 RX ADMIN — PANTOPRAZOLE SODIUM 40 MG: 40 INJECTION, POWDER, FOR SOLUTION INTRAVENOUS at 17:01

## 2022-09-22 RX ADMIN — ONDANSETRON 4 MG: 2 INJECTION INTRAMUSCULAR; INTRAVENOUS at 16:50

## 2022-09-22 RX ADMIN — DIPHENHYDRAMINE HYDROCHLORIDE 50 MG: 50 INJECTION, SOLUTION INTRAMUSCULAR; INTRAVENOUS at 16:48

## 2022-09-22 RX ADMIN — METOCLOPRAMIDE 10 MG: 5 INJECTION, SOLUTION INTRAMUSCULAR; INTRAVENOUS at 16:51

## 2022-09-22 RX ADMIN — SODIUM CHLORIDE 1000 ML: 9 INJECTION, SOLUTION INTRAVENOUS at 16:47

## 2022-09-22 NOTE — DISCHARGE INSTRUCTIONS
Follow-up with primary care physician for reevaluation call for an appointment. Keep scheduled appointment for Tuesday with a gastroenterologist.  Take Flagyl and ciprofloxacin antibiotic as prescribed and directed  Continue home potassium as prescribed and directed for hypokalemia. Continue home nausea vomiting medication as prescribed and directed. Return to the emergency department immediately if pain fever chills nausea vomiting dizzy lightheadedness or worsening symptoms.

## 2022-09-22 NOTE — ED NOTES
Pt resting on cot with eyes closed. Calm. Resp even and unlabored.      Joe Schultz, RN  09/22/22 7709

## 2022-09-22 NOTE — ED PROVIDER NOTES
Emergency Department Encounter    Patient: Devendra Reynaga  MRN: [id number]  : 1995  Date of Evaluation: 2022  ED Provider:  Juve Moura DO    Triage Chief Complaint:   Emesis    Eek:  Deevndra Reynaga is a 32 y.o. female that presents to the emergency department complaint of nausea vomiting started around 5:45 AM.  Patient states she ate some pizza and does have a history of acid reflux and ulcers. Patient states she is on Protonix patient did not take the medicine prior to eating. She states since then she has been having cough abdominal pain nausea vomiting decrease any fluids and liquids now. Patient states she tried some blue Gatorade unable to keep it down. Patient states diarrhea. She states no fevers or cough states she has had some chills. States history of cyclic vomiting. Patient states he has appointment with her gastroenterologist this coming Tuesday in 5 days. She denies any chest pain shortness of breath no dizziness lightheaded syncopal episode. Denies any dysuria hematuria no abdominal surgeries denies any headache vision changes no numbness or tingling extremities. Patient here for evaluation.     ROS - see HPI, below listed is current ROS at time of my eval:  General:  No fevers, no chills, no weakness  Eyes:  No recent vison changes, no discharge  ENT:  No sore throat, no nasal congestion, no hearing changes  Cardiovascular:  No chest pain, no palpitations  Respiratory:  No shortness of breath, no cough, no wheezing  Gastrointestinal: Positive for abdominal pain nausea vomiting, no diarrhea  Musculoskeletal:  No muscle pain, no joint pain  Skin:  No rash, no pruritis, no easy bruising  Neurologic:  No speech problems, no headache, no extremity numbness, no extremity tingling, no extremity weakness  Psychiatric:  No anxiety  Genitourinary:  No dysuria, no hematuria  Endocrine:  No unexpected weight gain, no unexpected weight loss  Extremities:  no edema, no pain    Past Medical History:   Diagnosis Date    Patient denies medical problems      Past Surgical History:   Procedure Laterality Date    TONSILLECTOMY       Family History   Adopted: Yes     Social History     Socioeconomic History    Marital status:      Spouse name: Not on file    Number of children: Not on file    Years of education: Not on file    Highest education level: Not on file   Occupational History    Not on file   Tobacco Use    Smoking status: Every Day     Packs/day: 0.50     Years: 1.00     Pack years: 0.50     Types: Cigarettes    Smokeless tobacco: Never   Vaping Use    Vaping Use: Some days   Substance and Sexual Activity    Alcohol use: No    Drug use: Not Currently     Types: Marijuana Yulissa Mitul)     Comment: occ    Sexual activity: Yes   Other Topics Concern    Not on file   Social History Narrative    Not on file     Social Determinants of Health     Financial Resource Strain: Not on file   Food Insecurity: Not on file   Transportation Needs: Not on file   Physical Activity: Not on file   Stress: Not on file   Social Connections: Not on file   Intimate Partner Violence: Not on file   Housing Stability: Not on file     Current Facility-Administered Medications   Medication Dose Route Frequency Provider Last Rate Last Admin    0.9 % sodium chloride bolus  1,000 mL IntraVENous Once Maria T Watson DO 1,000 mL/hr at 09/22/22 1647 1,000 mL at 09/22/22 1647    pantoprazole (PROTONIX) injection 40 mg  40 mg IntraVENous Once Kerry Arnold, DO         Current Outpatient Medications   Medication Sig Dispense Refill    buPROPion (WELLBUTRIN SR) 150 MG extended release tablet TAKE 1 TABLET BY MOUTH TWICE A DAY 60 tablet 0    promethazine (PROMETHEGAN) 25 MG suppository Place 1 suppository rectally every 6 hours as needed for Nausea 12 suppository 0    divalproex (DEPAKOTE ER) 250 MG extended release tablet Take 3 tablets by mouth in the morning. Stop and call if you get pregnant. . 90 tablet 0     Allergies   Allergen Reactions    Dust Mite Extract     Fish-Derived Products        Nursing Notes Reviewed    Physical Exam:  Triage VS:    ED Triage Vitals   Enc Vitals Group      BP 09/22/22 1643 (!) 144/99      Heart Rate 09/22/22 1643 64      Resp 09/22/22 1658 16      Temp 09/22/22 1643 98.6 °F (37 °C)      Temp Source 09/22/22 1643 Infrared      SpO2 09/22/22 1642 100 %      Weight 09/22/22 1639 140 lb (63.5 kg)      Height 09/22/22 1639 5' 9\" (1.753 m)      Head Circumference --       Peak Flow --       Pain Score --       Pain Loc --       Pain Edu? --       Excl. in [address]? --        My pulse ox interpretation is - normal    General appearance:  No acute distress. Skin:  Warm. Dry. Eye:  Extraocular movements intact. Ears, nose, mouth and throat:  Oral mucosa moist dry, lips and mouth are blue from Gatorade,  Neck:  Trachea midline. Extremity:  No swelling. Normal ROM     Heart:  Regular rate and rhythm, normal S1 & S2, no extra heart sounds. Perfusion:  intact  Respiratory:  Lungs clear to auscultation bilaterally. Respirations nonlabored. Abdominal:  Normal bowel sounds. Soft. Diffuse abdominal discomfort no rigidity or distention or rebound. Non distended. Back:  No CVA tenderness to palpation     Neurological:  Alert and oriented times 3. No focal neuro deficits.              Psychiatric:  Appropriate    I have reviewed and interpreted all of the currently available lab results from this visit (if applicable):  Results for orders placed or performed during the hospital encounter of 09/22/22   CBC with Diff   Result Value Ref Range    WBC 15.9 (H) 4.0 - 10.5 K/CU MM    RBC 4.83 4.2 - 5.4 M/CU MM    Hemoglobin 15.0 12.5 - 16.0 GM/DL    Hematocrit 43.8 37 - 47 %    MCV 90.7 78 - 100 FL    MCH 31.1 (H) 27 - 31 PG    MCHC 34.2 32.0 - 36.0 %    RDW 12.3 11.7 - 14.9 %    Platelets 155 501 - 307 K/CU MM    MPV 11.2 (H) 7.5 - 11.1 FL    Differential Type AUTOMATED DIFFERENTIAL Segs Relative 90.0 (H) 36 - 66 %    Lymphocytes % 6.0 (L) 24 - 44 %    Monocytes % 3.3 0 - 4 %    Eosinophils % 0.1 0 - 3 %    Basophils % 0.2 0 - 1 %    Segs Absolute 14.3 K/CU MM    Lymphocytes Absolute 1.0 K/CU MM    Monocytes Absolute 0.5 K/CU MM    Eosinophils Absolute 0.0 K/CU MM    Basophils Absolute 0.0 K/CU MM    Immature Neutrophil % 0.4 0 - 0.43 %    Total Immature Neutrophil 0.07 K/CU MM   CMP   Result Value Ref Range    Sodium 139 135 - 145 MMOL/L    Potassium 3.2 (L) 3.5 - 5.1 MMOL/L    Chloride 100 99 - 110 mMol/L    CO2 25 21 - 32 MMOL/L    BUN 13 6 - 23 MG/DL    Creatinine 0.8 0.6 - 1.1 MG/DL    Glucose 142 (H) 70 - 99 MG/DL    Calcium 9.5 8.3 - 10.6 MG/DL    Albumin 4.7 3.4 - 5.0 GM/DL    Total Protein 7.6 6.4 - 8.2 GM/DL    Total Bilirubin 0.7 0.0 - 1.0 MG/DL    ALT 26 10 - 40 U/L    AST 21 15 - 37 IU/L    Alkaline Phosphatase 77 40 - 129 IU/L    GFR Non-African American >60 >60 mL/min/1.73m2    GFR African American >60 >60 mL/min/1.73m2    Anion Gap 14 4 - 16   Lipase   Result Value Ref Range    Lipase 49 13 - 60 IU/L   Urinalysis with Reflex to Culture    Specimen: Urine   Result Value Ref Range    Color, UA YELLOW YELLOW    Clarity, UA CLEAR CLEAR    Glucose, Urine NEGATIVE NEGATIVE MG/DL    Bilirubin Urine NEGATIVE NEGATIVE MG/DL    Ketones, Urine TRACE (A) NEGATIVE MG/DL    Specific Gravity, UA 1.010 1.001 - 1.035    Blood, Urine SMALL NUMBER OR AMOUNT OBSERVED (A) NEGATIVE    pH, Urine 8.0 5.0 - 8.0    Protein, UA NEGATIVE NEGATIVE MG/DL    Urobilinogen, Urine 0.2 0.2 - 1.0 MG/DL    Nitrite Urine, Quantitative NEGATIVE NEGATIVE    Leukocyte Esterase, Urine NEGATIVE NEGATIVE   HCG Serum, Qualitative   Result Value Ref Range    hCG Qual NEGATIVE    Magnesium   Result Value Ref Range    Magnesium 1.5 (L) 1.8 - 2.4 mg/dl   Urine Microscopic with Reflex to Culture   Result Value Ref Range    RBC, UA 1 0 - 6 /HPF    WBC, UA <0 (L) 0 - 5 /HPF    Epithelial Cells, UA 3 /HPF    Cast Type NO CAST FORMS SEEN NO CAST FORMS SEEN /HPF    Bacteria, UA RARE (A) NEGATIVE /HPF    Crystal Type NEGATIVE NEGATIVE /HPF      Radiographs (if obtained):  Radiologist's Report Reviewed:  CT ABDOMEN PELVIS W IV CONTRAST Additional Contrast? None   Final Result   No definite acute abnormality identified. There is suggestion of mild mural thickening of the large bowel. That could   be artifactual secondary to incomplete distension. However, mild pancolitis   is also considered, especially given possible mild pericolonic fat stranding. EKG (if obtained): (All EKG's are interpreted by myself in the absence of a cardiologist)      MDM:  Patient presents to the emergency department complaining of nausea vomiting abdominal pain unable to keep any foods or liquids down. Patient with a history of cyclic vomiting. Patient has been seen multiple times in the emergency department. Patient states she ate pizza last evening and woke with acid reflux heartburn having vomiting since then. Patient also ordered laboratory studies. Patient elevated white count of 15.9. Patient was ordered Reglan Benadryl Zofran Protonix and IV fluids. Laboratory studies elevated white count 15.9. Patient normal hemoglobin platelets normal sodium. Patient potassium down to 3.2. Patient normal liver enzymes kidney function calcium. Patient glucose 142. Patient normal lipase. hCG negative. Patient magnesium slightly low 1.5. Patient urinalysis negative for urinary tract infection. CT of the abdomen pelvis shows no definite acute abnormality and in fact, mild mural thickening of the large bowel could be artifactual incomplete distention pain colitis considered given possible mild pericolonic fat stranding. This was discussed with patient she will be placed on ciprofloxacin and Flagyl. Patient states she does have potassium at home and will take her home potassium.   She states she does have Zofran and Phenergan everything at home.  States she has appointment with gastroenterologist on Tuesday. Patient will be discharged home she did request Protonix that she is out of. Patient is to return immediately worsening symptoms fever chills all questions answered. Clinical Impression:  1. Non-intractable vomiting with nausea, unspecified vomiting type    2. Generalized abdominal pain    3. Hypokalemia    4. Colitis      Disposition referral (if applicable):  Zonia Riley MD  [address]  [place]  [phone]    Schedule an appointment as soon as possible for a visit in 4 days  If symptoms worsen    [address]  [phone]  Go in 1 day  If symptoms worsen  Disposition medications (if applicable):  New Prescriptions    CIPROFLOXACIN (CIPRO) 500 MG TABLET    Take 1 tablet by mouth 2 times daily for 10 days    METRONIDAZOLE (FLAGYL) 500 MG TABLET    Take 1 tablet by mouth 3 times daily for 10 days    PANTOPRAZOLE (PROTONIX) 40 MG TABLET    Take 1 tablet by mouth every morning (before breakfast)     ED Provider Disposition Time  DISPOSITION  7:22 PM        Comment: Please note this report has been produced using speech recognition software and may contain errors related to that system including errors in grammar, punctuation, and spelling, as well as words and phrases that may be inappropriate. Efforts were made to edit the dictations.         [address],   09/22/22 1604

## 2022-09-24 ENCOUNTER — HOSPITAL ENCOUNTER (EMERGENCY)
Age: 27
Discharge: HOME | End: 2022-09-24
Attending: STUDENT IN AN ORGANIZED HEALTH CARE EDUCATION/TRAINING PROGRAM
Payer: MEDICARE

## 2022-09-24 VITALS
HEART RATE: 78 BPM | HEIGHT: 69 IN | WEIGHT: 187.2 LBS | DIASTOLIC BLOOD PRESSURE: 88 MMHG | BODY MASS INDEX: 27.73 KG/M2 | RESPIRATION RATE: 18 BRPM | OXYGEN SATURATION: 100 % | TEMPERATURE: 98.1 F | SYSTOLIC BLOOD PRESSURE: 138 MMHG

## 2022-09-24 DIAGNOSIS — Z87.440 H/O URINARY TRACT INFECTION: ICD-10-CM

## 2022-09-24 DIAGNOSIS — R11.0 NAUSEA: Primary | ICD-10-CM

## 2022-09-24 LAB
ALBUMIN SERPL-MCNC: 4.2 GM/DL (ref 3.4–5)
ALP BLD-CCNC: 68 IU/L (ref 40–129)
ALT SERPL-CCNC: 20 U/L (ref 10–40)
ANION GAP SERPL CALCULATED.3IONS-SCNC: 3 MMOL/L (ref 4–16)
AST SERPL-CCNC: 20 IU/L (ref 15–37)
BACTERIA: ABNORMAL /HPF
BASOPHILS ABSOLUTE: 0 K/CU MM
BASOPHILS RELATIVE PERCENT: 0.2 % (ref 0–1)
BILIRUB SERPL-MCNC: 1.1 MG/DL (ref 0–1)
BILIRUBIN URINE: NEGATIVE MG/DL
BLOOD, URINE: ABNORMAL
BUN BLDV-MCNC: 9 MG/DL (ref 6–23)
CALCIUM SERPL-MCNC: 8.8 MG/DL (ref 8.3–10.6)
CAST TYPE: ABNORMAL /HPF
CHLORIDE BLD-SCNC: 101 MMOL/L (ref 99–110)
CLARITY: ABNORMAL
CO2: 32 MMOL/L (ref 21–32)
COLOR: YELLOW
CREAT SERPL-MCNC: 0.7 MG/DL (ref 0.6–1.1)
CRYSTAL TYPE: NEGATIVE /HPF
DIFFERENTIAL TYPE: ABNORMAL
EOSINOPHILS ABSOLUTE: 0 K/CU MM
EOSINOPHILS RELATIVE PERCENT: 0 % (ref 0–3)
EPITHELIAL CELLS, UA: 5 /HPF
GFR AFRICAN AMERICAN: >60 ML/MIN/1.73M2
GFR NON-AFRICAN AMERICAN: >60 ML/MIN/1.73M2
GLUCOSE BLD-MCNC: 106 MG/DL (ref 70–99)
GLUCOSE, URINE: NEGATIVE MG/DL
HCT VFR BLD CALC: 40.2 % (ref 37–47)
HEMOGLOBIN: 13.8 GM/DL (ref 12.5–16)
IMMATURE NEUTROPHIL %: 0.3 % (ref 0–0.43)
INTERPRETATION: NORMAL
KETONES, URINE: >80 MG/DL
LEUKOCYTE ESTERASE, URINE: ABNORMAL
LYMPHOCYTES ABSOLUTE: 1.3 K/CU MM
LYMPHOCYTES RELATIVE PERCENT: 19.5 % (ref 24–44)
MAGNESIUM: 1.9 MG/DL (ref 1.8–2.4)
MCH RBC QN AUTO: 31.3 PG (ref 27–31)
MCHC RBC AUTO-ENTMCNC: 34.3 % (ref 32–36)
MCV RBC AUTO: 91.2 FL (ref 78–100)
MONOCYTES ABSOLUTE: 0.4 K/CU MM
MONOCYTES RELATIVE PERCENT: 6.6 % (ref 0–4)
NITRITE URINE, QUANTITATIVE: NEGATIVE
PDW BLD-RTO: 12.4 % (ref 11.7–14.9)
PH, URINE: 8.5 (ref 5–8)
PLATELET # BLD: 240 K/CU MM (ref 140–440)
PMV BLD AUTO: 11.2 FL (ref 7.5–11.1)
POTASSIUM SERPL-SCNC: 3.4 MMOL/L (ref 3.5–5.1)
PREGNANCY, URINE: NEGATIVE
PROTEIN UA: ABNORMAL MG/DL
RBC # BLD: 4.41 M/CU MM (ref 4.2–5.4)
RBC URINE: 35 /HPF (ref 0–6)
SEGMENTED NEUTROPHILS ABSOLUTE COUNT: 4.8 K/CU MM
SEGMENTED NEUTROPHILS RELATIVE PERCENT: 73.4 % (ref 36–66)
SODIUM BLD-SCNC: 136 MMOL/L (ref 135–145)
SPECIFIC GRAVITY UA: 1.01 (ref 1–1.03)
SPECIFIC GRAVITY, URINE: 1.01 (ref 1–1.03)
TOTAL IMMATURE NEUTOROPHIL: 0.02 K/CU MM
TOTAL PROTEIN: 6.8 GM/DL (ref 6.4–8.2)
UROBILINOGEN, URINE: 0.2 MG/DL (ref 0.2–1)
WBC # BLD: 6.5 K/CU MM (ref 4–10.5)
WBC UA: 7 /HPF (ref 0–5)

## 2022-09-24 PROCEDURE — 83735 ASSAY OF MAGNESIUM: CPT

## 2022-09-24 PROCEDURE — 2580000003 HC RX 258: Performed by: STUDENT IN AN ORGANIZED HEALTH CARE EDUCATION/TRAINING PROGRAM

## 2022-09-24 PROCEDURE — 81025 URINE PREGNANCY TEST: CPT

## 2022-09-24 PROCEDURE — A4216 STERILE WATER/SALINE, 10 ML: HCPCS | Performed by: STUDENT IN AN ORGANIZED HEALTH CARE EDUCATION/TRAINING PROGRAM

## 2022-09-24 PROCEDURE — 99284 EMERGENCY DEPT VISIT MOD MDM: CPT

## 2022-09-24 PROCEDURE — 81001 URINALYSIS AUTO W/SCOPE: CPT

## 2022-09-24 PROCEDURE — 80053 COMPREHEN METABOLIC PANEL: CPT

## 2022-09-24 PROCEDURE — 6360000002 HC RX W HCPCS: Performed by: STUDENT IN AN ORGANIZED HEALTH CARE EDUCATION/TRAINING PROGRAM

## 2022-09-24 PROCEDURE — 85025 COMPLETE CBC W/AUTO DIFF WBC: CPT

## 2022-09-24 PROCEDURE — 96376 TX/PRO/DX INJ SAME DRUG ADON: CPT

## 2022-09-24 PROCEDURE — 6370000000 HC RX 637 (ALT 250 FOR IP): Performed by: STUDENT IN AN ORGANIZED HEALTH CARE EDUCATION/TRAINING PROGRAM

## 2022-09-24 PROCEDURE — 2500000003 HC RX 250 WO HCPCS: Performed by: STUDENT IN AN ORGANIZED HEALTH CARE EDUCATION/TRAINING PROGRAM

## 2022-09-24 PROCEDURE — 96375 TX/PRO/DX INJ NEW DRUG ADDON: CPT

## 2022-09-24 PROCEDURE — 96374 THER/PROPH/DIAG INJ IV PUSH: CPT

## 2022-09-24 RX ORDER — ONDANSETRON 4 MG/1
4 TABLET, ORALLY DISINTEGRATING ORAL 3 TIMES DAILY PRN
Qty: 15 TABLET | Refills: 0 | Status: SHIPPED | OUTPATIENT
Start: 2022-09-24 | End: 2022-09-29

## 2022-09-24 RX ORDER — ONDANSETRON 2 MG/ML
4 INJECTION INTRAMUSCULAR; INTRAVENOUS ONCE
Status: COMPLETED | OUTPATIENT
Start: 2022-09-24 | End: 2022-09-24

## 2022-09-24 RX ORDER — LIDOCAINE HYDROCHLORIDE 20 MG/ML
10 SOLUTION OROPHARYNGEAL ONCE
Status: COMPLETED | OUTPATIENT
Start: 2022-09-24 | End: 2022-09-24

## 2022-09-24 RX ORDER — 0.9 % SODIUM CHLORIDE 0.9 %
1000 INTRAVENOUS SOLUTION INTRAVENOUS ONCE
Status: COMPLETED | OUTPATIENT
Start: 2022-09-24 | End: 2022-09-24

## 2022-09-24 RX ORDER — MAGNESIUM HYDROXIDE/ALUMINUM HYDROXICE/SIMETHICONE 120; 1200; 1200 MG/30ML; MG/30ML; MG/30ML
30 SUSPENSION ORAL ONCE
Status: COMPLETED | OUTPATIENT
Start: 2022-09-24 | End: 2022-09-24

## 2022-09-24 RX ADMIN — ALUMINUM HYDROXIDE, MAGNESIUM HYDROXIDE, AND SIMETHICONE 30 ML: 200; 200; 20 SUSPENSION ORAL at 17:41

## 2022-09-24 RX ADMIN — ONDANSETRON 4 MG: 2 INJECTION INTRAMUSCULAR; INTRAVENOUS at 17:35

## 2022-09-24 RX ADMIN — LIDOCAINE HYDROCHLORIDE 10 ML: 20 SOLUTION ORAL; TOPICAL at 17:41

## 2022-09-24 RX ADMIN — SODIUM CHLORIDE 1000 ML: 9 INJECTION, SOLUTION INTRAVENOUS at 17:35

## 2022-09-24 RX ADMIN — FAMOTIDINE 20 MG: 10 INJECTION, SOLUTION INTRAVENOUS at 19:12

## 2022-09-24 RX ADMIN — ONDANSETRON 4 MG: 2 INJECTION INTRAMUSCULAR; INTRAVENOUS at 19:12

## 2022-09-24 ASSESSMENT — PAIN SCALES - GENERAL
PAINLEVEL_OUTOF10: 7
PAINLEVEL_OUTOF10: 8

## 2022-09-24 ASSESSMENT — PAIN - FUNCTIONAL ASSESSMENT
PAIN_FUNCTIONAL_ASSESSMENT: 0-10
PAIN_FUNCTIONAL_ASSESSMENT: 0-10

## 2022-09-24 NOTE — ED TRIAGE NOTES
Patient C/O nausea and vomiting. States she is here for nausea and was seen at another facility this morning for the same thing. Patient is not actively vomiting at this time.

## 2022-09-24 NOTE — ED NOTES
Patient discharged with 1 new rx. Patient verbalized understanding of discharge instructions and follow up care.       Radha Ramires RN  09/24/22 [id number] Patient

## 2022-09-24 NOTE — ED NOTES
Pt has multiple ER visits in the last 2 days for same  Most recent this am at [facility] ER     Marcelene Mayotte, [place]  09/24/22 2196

## 2022-09-24 NOTE — ED PROVIDER NOTES
Smokeless tobacco: Never   Vaping Use    Vaping Use: Some days   Substance and Sexual Activity    Alcohol use: No    Drug use: Not Currently     Types: Marijuana Kathi Kilgore)     Comment: occ    Sexual activity: Yes   Other Topics Concern    Not on file   Social History Narrative    Not on file     Social Determinants of Health     Financial Resource Strain: Not on file   Food Insecurity: Not on file   Transportation Needs: Not on file   Physical Activity: Not on file   Stress: Not on file   Social Connections: Not on file   Intimate Partner Violence: Not on file   Housing Stability: Not on file     No current facility-administered medications for this encounter. Current Outpatient Medications   Medication Sig Dispense Refill    ondansetron (ZOFRAN-ODT) 4 MG disintegrating tablet Take 1 tablet by mouth 3 times daily as needed for Nausea or Vomiting 15 tablet 0    metroNIDAZOLE (FLAGYL) 500 MG tablet Take 1 tablet by mouth 3 times daily for 10 days 30 tablet 0    ciprofloxacin (CIPRO) 500 MG tablet Take 1 tablet by mouth 2 times daily for 10 days 20 tablet 0    pantoprazole (PROTONIX) 40 MG tablet Take 1 tablet by mouth every morning (before breakfast) 30 tablet 0    buPROPion (WELLBUTRIN SR) 150 MG extended release tablet TAKE 1 TABLET BY MOUTH TWICE A DAY 60 tablet 0    divalproex (DEPAKOTE ER) 250 MG extended release tablet Take 3 tablets by mouth in the morning. Stop and call if you get pregnant. . 90 tablet 0     Allergies   Allergen Reactions    Dust Mite Extract     Fish-Derived Products        Nursing Notes Reviewed    Physical Exam:  Triage VS:    ED Triage Vitals [09/24/22 1701]   Enc Vitals Group      /74      Heart Rate 72      Resp 15      Temp 98.1 °F (36.7 °C)      Temp Source Temporal      SpO2 100 %      Weight 187 lb 3.2 oz (84.9 kg)      Height 5' 9\" (1.753 m)      Head Circumference       Peak Flow       Pain Score       Pain Loc       Pain Edu? Excl. in [address]?         My pulse ox interpretation is - normal    General appearance:  No acute distress. Skin:  Warm. Dry. Eye:  Extraocular movements intact. Ears, nose, mouth and throat:  Oral mucosa moist   Neck:  Trachea midline. Extremity:  No swelling. Normal ROM     Heart:  Regular rate and rhythm, normal S1 & S2, no extra heart sounds. Perfusion:  intact  Respiratory:  Lungs clear to auscultation bilaterally. Respirations nonlabored. Abdominal:  Normal bowel sounds. Soft. Nontender. Non distended. Back:  No CVA tenderness to palpation     Neurological:  Alert and oriented times 3. No focal neuro deficits.              Psychiatric:  Appropriate    I have reviewed and interpreted all of the currently available lab results from this visit (if applicable):  Results for orders placed or performed during the hospital encounter of 09/24/22   HCG, Urine, Qualitative, Pregnancy (Lab)   Result Value Ref Range    Pregnancy, Urine NEGATIVE NEGATIVE    Specific Gravity, Urine 1.010 1.001 - 1.035    Interpretation HCG METHOD LIMITATIONS:    Urinalysis with Reflex to Culture    Specimen: Urine   Result Value Ref Range    Color, UA YELLOW YELLOW    Clarity, UA SLIGHTLY CLOUDY (A) CLEAR    Glucose, Urine NEGATIVE NEGATIVE MG/DL    Bilirubin Urine NEGATIVE NEGATIVE MG/DL    Ketones, Urine >80 (A) NEGATIVE MG/DL    Specific Gravity, UA 1.010 1.001 - 1.035    Blood, Urine LARGE NUMBER OR AMOUNT OBSERVED (A) NEGATIVE    pH, Urine 8.5 (HH) 5.0 - 8.0    Protein, UA TRACE (A) NEGATIVE MG/DL    Urobilinogen, Urine 0.2 0.2 - 1.0 MG/DL    Nitrite Urine, Quantitative NEGATIVE NEGATIVE    Leukocyte Esterase, Urine TRACE (A) NEGATIVE   CMP   Result Value Ref Range    Sodium 136 135 - 145 MMOL/L    Potassium 3.4 (L) 3.5 - 5.1 MMOL/L    Chloride 101 99 - 110 mMol/L    CO2 32 21 - 32 MMOL/L    BUN 9 6 - 23 MG/DL    Creatinine 0.7 0.6 - 1.1 MG/DL    Glucose 106 (H) 70 - 99 MG/DL    Calcium 8.8 8.3 - 10.6 MG/DL    Albumin 4.2 3.4 - 5.0 GM/DL    Total Protein 6.8 6.4 - 8.2 GM/DL    Total Bilirubin 1.1 (H) 0.0 - 1.0 MG/DL    ALT 20 10 - 40 U/L    AST 20 15 - 37 IU/L    Alkaline Phosphatase 68 40 - 129 IU/L    GFR Non-African American >60 >60 mL/min/1.73m2    GFR African American >60 >60 mL/min/1.73m2    Anion Gap 3 (L) 4 - 16   CBC with Auto Differential   Result Value Ref Range    WBC 6.5 4.0 - 10.5 K/CU MM    RBC 4.41 4.2 - 5.4 M/CU MM    Hemoglobin 13.8 12.5 - 16.0 GM/DL    Hematocrit 40.2 37 - 47 %    MCV 91.2 78 - 100 FL    MCH 31.3 (H) 27 - 31 PG    MCHC 34.3 32.0 - 36.0 %    RDW 12.4 11.7 - 14.9 %    Platelets 866 342 - 392 K/CU MM    MPV 11.2 (H) 7.5 - 11.1 FL    Differential Type AUTOMATED DIFFERENTIAL     Segs Relative 73.4 (H) 36 - 66 %    Lymphocytes % 19.5 (L) 24 - 44 %    Monocytes % 6.6 (H) 0 - 4 %    Eosinophils % 0.0 0 - 3 %    Basophils % 0.2 0 - 1 %    Segs Absolute 4.8 K/CU MM    Lymphocytes Absolute 1.3 K/CU MM    Monocytes Absolute 0.4 K/CU MM    Eosinophils Absolute 0.0 K/CU MM    Basophils Absolute 0.0 K/CU MM    Immature Neutrophil % 0.3 0 - 0.43 %    Total Immature Neutrophil 0.02 K/CU MM   Magnesium   Result Value Ref Range    Magnesium 1.9 1.8 - 2.4 mg/dl   Urine Microscopic with Reflex to Culture   Result Value Ref Range    RBC, UA 35 (H) 0 - 6 /HPF    WBC, UA 7 (H) 0 - 5 /HPF    Epithelial Cells, UA 5 /HPF    Cast Type NO CAST FORMS SEEN NO CAST FORMS SEEN /HPF    Bacteria, UA MANY (A) NEGATIVE /HPF    Crystal Type NEGATIVE NEGATIVE /HPF      Labs Reviewed   URINALYSIS WITH REFLEX TO CULTURE - Abnormal; Notable for the following components:       Result Value    Clarity, UA SLIGHTLY CLOUDY (*)     Ketones, Urine >80 (*)     Blood, Urine LARGE NUMBER OR AMOUNT OBSERVED (*)     pH, Urine 8.5 (*)     Protein, UA TRACE (*)     Leukocyte Esterase, Urine TRACE (*)     All other components within normal limits   COMPREHENSIVE METABOLIC PANEL - Abnormal; Notable for the following components:    Potassium 3.4 (*)     Glucose 106 (*)     Total Bilirubin 1.1 (*)     Anion Gap 3 (*)     All other components within normal limits   CBC WITH AUTO DIFFERENTIAL - Abnormal; Notable for the following components:    MCH 31.3 (*)     MPV 11.2 (*)     Segs Relative 73.4 (*)     Lymphocytes % 19.5 (*)     Monocytes % 6.6 (*)     All other components within normal limits   URINE MICROSCOPIC WITH REFLEX TO CULTURE - Abnormal; Notable for the following components:    RBC, UA 35 (*)     WBC, UA 7 (*)     Bacteria, UA MANY (*)     All other components within normal limits   PREGNANCY, URINE   MAGNESIUM       Radiographs (if obtained):  Radiologist's Report Reviewed:  No results found. EKG (if obtained): (All EKG's are interpreted by myself in the absence of a cardiologist)      MDM:  59-year-old female presented to the ED with a history of nausea vomiting. Patient endorses multiple episodes of vomiting the past 2 days. Patient also endorses history of UTI for which she was being treated with antibiotics 2 days ago. Patient states she has not been able to take her antibiotics. Physical examination significant for female adult in mild to moderate distress due to nausea. Laboratory evaluation significant for bacteriuria. Patient was given multiple doses of Zofran in ED with some improvement. 5:38 PM  Patient complains of gastric reflux and is given a GI cocktail with significant improvement in the symptoms. Patient also given Pepcid in the ED. Patient discharged with Zofran and advised to follow-up with primary care. Patient also advised to take her home oral antibiotics for UTI that she was given 2 days ago. And to return to the ER if she is unable to tolerate p.o. Patient expressed her understanding and agreement with the plan. Clinical Impression:  1. Nausea    2. H/O urinary tract infection      Disposition referral (if applicable):  No follow-up provider specified.   Disposition medications (if applicable):  Discharge Medication List as of 9/24/2022  7:29 PM        START taking these medications    Details   ondansetron (ZOFRAN-ODT) 4 MG disintegrating tablet Take 1 tablet by mouth 3 times daily as needed for Nausea or Vomiting, Disp-15 tablet, R-0Normal           ED Provider Disposition Time  DISPOSITION Decision To Discharge 09/24/2022 07:24:53 PM      Comment: Please note this report has been produced using speech recognition software and may contain errors related to that system including errors in grammar, punctuation, and spelling, as well as words and phrases that may be inappropriate. Efforts were made to edit the dictations.         [address] [facility] [address], DO  09/27/22 2017

## 2022-10-07 ENCOUNTER — OFFICE VISIT (OUTPATIENT)
Dept: GASTROENTEROLOGY | Age: 27
End: 2022-10-07
Payer: MEDICARE

## 2022-10-07 VITALS
HEIGHT: 69 IN | TEMPERATURE: 97.3 F | HEART RATE: 91 BPM | BODY MASS INDEX: 28.02 KG/M2 | DIASTOLIC BLOOD PRESSURE: 72 MMHG | WEIGHT: 189.2 LBS | SYSTOLIC BLOOD PRESSURE: 108 MMHG | OXYGEN SATURATION: 94 %

## 2022-10-07 DIAGNOSIS — R11.15 CYCLIC VOMITING SYNDROME: Primary | ICD-10-CM

## 2022-10-07 PROCEDURE — G8419 CALC BMI OUT NRM PARAM NOF/U: HCPCS | Performed by: SPECIALIST

## 2022-10-07 PROCEDURE — G8427 DOCREV CUR MEDS BY ELIG CLIN: HCPCS | Performed by: SPECIALIST

## 2022-10-07 PROCEDURE — G8484 FLU IMMUNIZE NO ADMIN: HCPCS | Performed by: SPECIALIST

## 2022-10-07 PROCEDURE — 99204 OFFICE O/P NEW MOD 45 MIN: CPT | Performed by: SPECIALIST

## 2022-10-07 PROCEDURE — 4004F PT TOBACCO SCREEN RCVD TLK: CPT | Performed by: SPECIALIST

## 2022-10-07 RX ORDER — PROMETHAZINE HYDROCHLORIDE 12.5 MG/1
12.5 TABLET ORAL EVERY 6 HOURS PRN
COMMUNITY

## 2022-10-07 RX ORDER — POTASSIUM CHLORIDE 600 MG/1
TABLET, FILM COATED, EXTENDED RELEASE ORAL
COMMUNITY
Start: 2022-09-15

## 2022-10-07 RX ORDER — ONDANSETRON 4 MG/1
4 TABLET, FILM COATED ORAL EVERY 8 HOURS PRN
COMMUNITY

## 2022-10-07 RX ORDER — METOCLOPRAMIDE 10 MG/1
TABLET ORAL
Status: ON HOLD | COMMUNITY
Start: 2022-09-23 | End: 2022-10-25 | Stop reason: HOSPADM

## 2022-10-07 NOTE — PROGRESS NOTES
Gastroenterology Consult Note  Samantha Dunn. Faizan ARREOLA      Reason for Consult:  nausea and vomiting  Primary Care / referring Physician:  Kaden Álvarez MD      History Obtained From:  patient    CC: nausea and vomiting    HISTORY OF PRESENT ILLNESS:          Nausea and vomiting- all her life- getting worse. Comes in discrete episodes every 2-3 months- lasts about 1 week. Saw GI at [address] [place] [address] many years ago-- had EGD- has not had GES. Had hyperemesis during last pregnancy. Smokes marijuana almost every day. She has tried hot showers and they help some. No hematemesis although does have some dark emesis after repeated retching. Has some right-sided abd pain. BM's- ocassional diarrhea- not often - no melena, hematochezia. Is gaining some weight. Rarely drinks alcohol, never takes NSAID's. Past Medical History:        Diagnosis Date    Patient denies medical problems        Past Surgical History:        Procedure Laterality Date    TONSILLECTOMY         Social History:   Social History     Tobacco Use    Smoking status: Every Day     Packs/day: 0.50     Years: 1.00     Pack years: 0.50     Types: Cigarettes    Smokeless tobacco: Never   Substance Use Topics    Alcohol use: No       Medications:   Prior to Admission medications    Medication Sig Start Date End Date Taking? Authorizing Provider   metoclopramide (REGLAN) 10 MG tablet TAKE 1 TABLET (10 MG TOTAL) BY MOUTH 3 TIMES A DAY (WITH MEALS) FOR 5 DAYS. 9/23/22  Yes Historical Provider, MD   potassium chloride (KLOR-CON) 8 MEQ extended release tablet TAKE 1 TABLET (8 MEQ TOTAL) BY MOUTH 2 TIMES A DAY FOR 15 DAYS.  9/15/22  Yes Historical Provider, MD   promethazine (PHENERGAN) 12.5 MG tablet Take 12.5 mg by mouth every 6 hours as needed for Nausea   Yes Historical Provider, MD   ondansetron (ZOFRAN) 4 MG tablet Take 4 mg by mouth every 8 hours as needed for Nausea or Vomiting   Yes Historical Provider, MD   pantoprazole (PROTONIX) 40 MG tablet Take 1 tablet by mouth every morning (before breakfast) 9/22/22  Yes Kerry Whittaker Come, DO   buPROPion [facility] SR) 150 MG extended release tablet TAKE 1 TABLET BY MOUTH TWICE A DAY 9/16/22  Yes Mayank Armendariz MD   divalproex (DEPAKOTE ER) 250 MG extended release tablet Take 3 tablets by mouth in the morning. Stop and call if you get pregnant. . 8/6/22  Yes Mayank Armendariz MD       Allergies: Allergies   Allergen Reactions    Dust Mite Extract     Fish-Derived Products    . Family History:   reviewed and positives included in HPI- all other pertinent GI family history negative    REVIEW OF SYSTEMS:   see HPI for positives and pertinent negatives.  All other systems reviewed and are negative    PHYSICAL EXAM:    Vitals:  /72 (Site: Left Upper Arm, Position: Sitting, Cuff Size: Medium Adult)   Pulse 91   Temp 97.3 °F (36.3 °C) (Infrared)   Ht 5' 9\" (1.753 m)   Wt 189 lb 3.2 oz (85.8 kg)   SpO2 94%   BMI 27.94 kg/m²   CONSTITUTIONAL: alert, cooperative, no apparent distress,   EYES:  pupils equal, round and reactive to light and sclera clear and non-icteric  ENT:  normocepalic, without obvious abnormality, airway Class 1  NECK:  supple, symmetrical, trachea midline,thyroid not enlarged and without masses  HEMATOLOGIC/LYMPHATICS:  no cervical lymphadenopathy and no supraclavicular lymphadenopathy  LUNGS:  no increased respiratory effort, lungs clear to auscultation  CARDIOVASCULAR:  regular rate and rhythm and no murmur noted, no evidence of AAA  ABDOMEN:  normal bowel sounds, soft, non-distended, non-tender with no hernias apprecioated nor masses palpated--no hepatosplenomegaly  NEUROLOGIC: cranial nerves 2-12 grossly intact,no focal deficit detected, no asterixis  SKIN:  no lesions, jaundice  EXTREMITIES: no clubbing, cyanosis, or edema    DATA:      CBC:      Lab Results   Component Value Date/Time    WBC 6.5 09/24/2022 05:36 PM    HGB 13.8 09/24/2022 05:36 PM    MCV 91.2 09/24/2022 05:36 PM     09/24/2022 05:36 PM     CMP:    Lab Results   Component Value Date/Time     09/24/2022 05:36 PM    K 3.4 09/24/2022 05:36 PM     09/24/2022 05:36 PM    CO2 32 09/24/2022 05:36 PM    BUN 9 09/24/2022 05:36 PM    CREATININE 0.7 09/24/2022 05:36 PM    GFRAA >60 09/24/2022 05:36 PM    LABGLOM >60 09/24/2022 05:36 PM    GLUCOSE 106 09/24/2022 05:36 PM    PROT 6.8 09/24/2022 05:36 PM    LABALBU 4.2 09/24/2022 05:36 PM    CALCIUM 8.8 09/24/2022 05:36 PM    BILITOT 1.1 09/24/2022 05:36 PM    ALKPHOS 68 09/24/2022 05:36 PM    AST 20 09/24/2022 05:36 PM    ALT 20 09/24/2022 05:36 PM         IMPRESSION:  1) cyclic vomiting- ?? Marijuana hyperemesis      RECOMMENDATIONS:  1) GES- hold Reglan and scopalamine patch for 3 days before  2) EGD after the GES  3) suggested complete abstinence from marijuana  4) return 3 months        Sree Gloria M.D.

## 2022-10-20 DIAGNOSIS — F17.200 SMOKER: ICD-10-CM

## 2022-10-20 DIAGNOSIS — F31.9 BIPOLAR 1 DISORDER (HCC): ICD-10-CM

## 2022-10-20 RX ORDER — BUPROPION HYDROCHLORIDE 150 MG/1
TABLET, EXTENDED RELEASE ORAL
Qty: 60 TABLET | Refills: 0 | Status: SHIPPED | OUTPATIENT
Start: 2022-10-20

## 2022-10-20 NOTE — PROGRESS NOTES
Patient will be called with an arrival time on 10/24/2022 for her procedure at [facility] on 10/25/2022. 1. Do not eat or drink anything after 12 midnight (or____hours) unless instructed by your doctor prior to surgery. This includes no water, chewing gum or mints. NO chewing tobacco.  2. Follow your directions as prescribed by the doctor for your procedure and medications. 3.Check with your Doctor regarding stopping Plavix, Coumadin, Lovenox,Effient,Pradaxa,Xarelto, Fragmin or other blood thinners and follow their instructions. 4. Do not smoke, and do not drink any alcoholic beverages 24 hours prior to surgery. This includes NA Beer. 5. You may brush your teeth and gargle the morning of surgery. DO NOT SWALLOW WATER  6. You MUST make arrangements for a responsible adult to take you home after your surgery and be able to check on you every couple hours for the day. You will not be allowed     to leave alone or drive yourself home. It is strongly suggested someone stay with you the first 24 hrs. Your surgery will be cancelled if you do not have a ride home. 7. Please wear simple, loose fitting clothing to the hospital.  Ari Farfan not bring valuables (money, credit cards, checkbooks, etc.) Do not wear any makeup (including no eye makeup) or nail polish on your fingers or toes. 8. DO NOT wear any jewelry or piercings on day of surgery. All body piercing jewelry must be removed  9. If you have dentures, they will be removed before going to the OR; we will provide you a container. If you wear contact lenses or glasses, they will be removed; please bring a case for them. 10. If you  have a Living Will and Durable Power of  for Healthcare, please bring in a copy. 11 Please bring picture ID,  insurance card, paperwork from the doctors office    (H & P, Consent, & card for implantable devices). Patient verbalized understanding concerning egd instructions and had no questions at this time.

## 2022-10-21 ENCOUNTER — HOSPITAL ENCOUNTER (OUTPATIENT)
Dept: NUCLEAR MEDICINE | Age: 27
Discharge: HOME | End: 2022-10-21
Payer: MEDICARE

## 2022-10-21 DIAGNOSIS — R11.15 CYCLIC VOMITING SYNDROME: ICD-10-CM

## 2022-10-21 PROCEDURE — A9541 TC99M SULFUR COLLOID: HCPCS | Performed by: SPECIALIST

## 2022-10-21 PROCEDURE — 3430000000 HC RX DIAGNOSTIC RADIOPHARMACEUTICAL: Performed by: SPECIALIST

## 2022-10-21 PROCEDURE — 78264 GASTRIC EMPTYING IMG STUDY: CPT

## 2022-10-21 RX ADMIN — Medication 1.1 MILLICURIE: at 09:49

## 2022-10-23 NOTE — H&P
Original H &P in soft chart. I have examined the patient immediately before the procedure and there is no change in the previous history and physical exam, which has been reviewed. There is no history of sleep apnea, snoring, or stridor. There has been no  previous adverse experience with sedation/anesthesia. There is no increased risk for aspiration of gastric contents. The patient has been instructed that all resuscitative measures (during the operative and immediate perioperative period) will be instituted in the unlikely event that they will be needed. The patient has no pertinent past surgical or family history other than listed in the original H&P. The patient was counseled about the risks of ascencion Covid-19 during their perioperative period and any recovery window from their procedure. The patient was made aware that ascencion Covid-19  may worsen their prognosis for recovering from their procedure  and lend to a higher morbidity and/or mortality risk. All material risks, benefits, and reasonable alternatives including postponing the procedure were discussed. The patient does wish to proceed with the procedure at this time.     ASA Class: 2  AIRWAY Class: 1

## 2022-10-24 ENCOUNTER — ANESTHESIA EVENT (OUTPATIENT)
Dept: OPERATING ROOM | Age: 27
End: 2022-10-24
Payer: MEDICARE

## 2022-10-24 ASSESSMENT — LIFESTYLE VARIABLES: SMOKING_STATUS: 1

## 2022-10-24 NOTE — ANESTHESIA PRE PROCEDURE
Department of Anesthesiology  Preprocedure Note       Name:  Mireya Kamara   Age:  32 y.o.  :  1995                                          MRN:  [id number]         Date:  10/24/2022      Surgeon: Mili Fernandez):  Marva Hernández MD    Procedure: Procedure(s):  EGD ESOPHAGOGASTRODUODENOSCOPY    Medications prior to admission:   Prior to Admission medications    Medication Sig Start Date End Date Taking? Authorizing Provider   [facility]ion [facility] SR) 150 MG extended release tablet TAKE 1 TABLET BY MOUTH TWICE A DAY 10/20/22   Bonita Max MD   metoclopramide (REGLAN) 10 MG tablet TAKE 1 TABLET (10 MG TOTAL) BY MOUTH 3 TIMES A DAY (WITH MEALS) FOR 5 DAYS. 22   Historical Provider, MD   potassium chloride (KLOR-CON) 8 MEQ extended release tablet TAKE 1 TABLET (8 MEQ TOTAL) BY MOUTH 2 TIMES A DAY FOR 15 DAYS. 9/15/22   Historical Provider, MD   promethazine (PHENERGAN) 12.5 MG tablet Take 12.5 mg by mouth every 6 hours as needed for Nausea    Historical Provider, MD   ondansetron (ZOFRAN) 4 MG tablet Take 4 mg by mouth every 8 hours as needed for Nausea or Vomiting    Historical Provider, MD   pantoprazole (PROTONIX) 40 MG tablet Take 1 tablet by mouth every morning (before breakfast) 22   Kerry Herr DO   divalproex (DEPAKOTE ER) 250 MG extended release tablet Take 3 tablets by mouth in the morning. Stop and call if you get pregnant. . 22   Bonita Max MD       Current medications:    No current facility-administered medications for this encounter. Current Outpatient Medications   Medication Sig Dispense Refill    buPROPion (WELLBUTRIN SR) 150 MG extended release tablet TAKE 1 TABLET BY MOUTH TWICE A DAY 60 tablet 0    metoclopramide (REGLAN) 10 MG tablet TAKE 1 TABLET (10 MG TOTAL) BY MOUTH 3 TIMES A DAY (WITH MEALS) FOR 5 DAYS.  potassium chloride (KLOR-CON) 8 MEQ extended release tablet TAKE 1 TABLET (8 MEQ TOTAL) BY MOUTH 2 TIMES A DAY FOR 15 DAYS.       promethazine (PHENERGAN) 12.5 MG tablet Take 12.5 mg by mouth every 6 hours as needed for Nausea      ondansetron (ZOFRAN) 4 MG tablet Take 4 mg by mouth every 8 hours as needed for Nausea or Vomiting      pantoprazole (PROTONIX) 40 MG tablet Take 1 tablet by mouth every morning (before breakfast) 30 tablet 0    divalproex (DEPAKOTE ER) 250 MG extended release tablet Take 3 tablets by mouth in the morning. Stop and call if you get pregnant. . 90 tablet 0       Allergies: Allergies   Allergen Reactions    Dust Mite Extract     Fish-Derived Products        Problem List:    Patient Active Problem List   Diagnosis Code    Cyclic vomiting syndrome R11.15    Hypokalemia E87.6    Asperger's disorder F84.5    Bipolar 1 disorder (HCC) F31.9    Subclinical hyperthyroidism E05.90    Rhinitis J31.0    Status post bilateral salpingectomy Z90.79    Smoker F17.200       Past Medical History:        Diagnosis Date    Acid reflux     Nausea     Patient denies medical problems        Past Surgical History:        Procedure Laterality Date    NASAL SINUS SURGERY      TONSILLECTOMY      TUBAL LIGATION Bilateral        Social History:    Social History     Tobacco Use    Smoking status: Every Day     Packs/day: 0.50     Years: 1.00     Pack years: 0.50     Types: Cigarettes    Smokeless tobacco: Never   Substance Use Topics    Alcohol use: No                                Ready to quit: Not Answered  Counseling given: Not Answered      Vital Signs (Current):   Vitals:    10/20/22 1322   Weight: 180 lb (81.6 kg)   Height: 5' 9\" (1.753 m)                                              BP Readings from Last 3 Encounters:   10/07/22 108/72   09/24/22 138/88   09/22/22 (!) 144/99       NPO Status:                                                                        8 hrs.          BMI:   Wt Readings from Last 3 Encounters:   10/07/22 189 lb 3.2 oz (85.8 kg)   09/24/22 187 lb 3.2 oz (84.9 kg)   09/22/22 140 lb (63.5 kg)     Body mass index is 26.58 kg/m². CBC:   Lab Results   Component Value Date/Time    WBC 6.5 09/24/2022 05:36 PM    RBC 4.41 09/24/2022 05:36 PM    HGB 13.8 09/24/2022 05:36 PM    HCT 40.2 09/24/2022 05:36 PM    MCV 91.2 09/24/2022 05:36 PM    RDW 12.4 09/24/2022 05:36 PM     09/24/2022 05:36 PM       CMP:   Lab Results   Component Value Date/Time     09/24/2022 05:36 PM    K 3.4 09/24/2022 05:36 PM     09/24/2022 05:36 PM    CO2 32 09/24/2022 05:36 PM    BUN 9 09/24/2022 05:36 PM    CREATININE 0.7 09/24/2022 05:36 PM    GFRAA >60 09/24/2022 05:36 PM    LABGLOM >60 09/24/2022 05:36 PM    GLUCOSE 106 09/24/2022 05:36 PM    PROT 6.8 09/24/2022 05:36 PM    CALCIUM 8.8 09/24/2022 05:36 PM    BILITOT 1.1 09/24/2022 05:36 PM    ALKPHOS 68 09/24/2022 05:36 PM    AST 20 09/24/2022 05:36 PM    ALT 20 09/24/2022 05:36 PM       POC Tests: No results for input(s): POCGLU, POCNA, POCK, POCCL, POCBUN, POCHEMO, POCHCT in the last 72 hours.     Coags: No results found for: PROTIME, INR, APTT    HCG (If Applicable):   Lab Results   Component Value Date    PREGTESTUR NEGATIVE 09/24/2022        ABGs: No results found for: PHART, PO2ART, TOR1NIR, IGW0SSI, BEART, R3JYKNQT     Type & Screen (If Applicable):  No results found for: LABABO, LABRH    Drug/Infectious Status (If Applicable):  No results found for: HIV, HEPCAB    COVID-19 Screening (If Applicable):   Lab Results   Component Value Date/Time    COVID19 NOT DETECTED 04/03/2022 09:25 PM           Anesthesia Evaluation  Patient summary reviewed no history of anesthetic complications:   Airway: Mallampati: III  TM distance: >3 FB   Neck ROM: full  Mouth opening: > = 3 FB   Dental: normal exam         Pulmonary:normal exam    (+) current smoker                           Cardiovascular:  Exercise tolerance: good (>4 METS),            Beta Blocker:  Not on Beta Blocker      ROS comment: Normal sinus rhythm   Prolonged QT   Abnormal ECG   When compared with ECG of 27-MAY-2022 09:29,   No significant change was found   Confirmed by Keanu Hoffman, SAYED ([id number]) on 9/15/2022 7:23:24 PM      Neuro/Psych:   (+) psychiatric history:             ROS comment: Asperger's disorder  Bipolar 1 disorder      GI/Hepatic/Renal:   (+) GERD:,           Endo/Other: Negative Endo/Other ROS                    Abdominal:             Vascular: negative vascular ROS. Other Findings:           Anesthesia Plan      MAC     ASA 2       Induction: intravenous. Anesthetic plan and risks discussed with patient. KARLA Hodges CRNA   10/24/2022        Pre Anesthesia Evaluation complete. Anesthesia plan, risks, benefits, alternatives, and personnel discussed with patient and/or legal guardian. Patient and/or legal guardian verbalized an understanding and agreed to proceed. Anesthesia plan discussed with care team members and agreed upon.   KARLA Hodges CRNA  10/25/2022

## 2022-10-24 NOTE — PROGRESS NOTES
Spoke with patient and she will arrive at 1045 at [facility] on 10/25/2022 for her procedure at 1145.

## 2022-10-25 ENCOUNTER — HOSPITAL ENCOUNTER (OUTPATIENT)
Age: 27
Setting detail: OUTPATIENT SURGERY
Discharge: HOME | End: 2022-10-25
Attending: SPECIALIST | Admitting: SPECIALIST
Payer: MEDICARE

## 2022-10-25 ENCOUNTER — ANESTHESIA (OUTPATIENT)
Dept: OPERATING ROOM | Age: 27
End: 2022-10-25
Payer: MEDICARE

## 2022-10-25 VITALS
RESPIRATION RATE: 16 BRPM | SYSTOLIC BLOOD PRESSURE: 140 MMHG | BODY MASS INDEX: 26.22 KG/M2 | HEART RATE: 77 BPM | HEIGHT: 69 IN | DIASTOLIC BLOOD PRESSURE: 94 MMHG | TEMPERATURE: 99 F | OXYGEN SATURATION: 100 % | WEIGHT: 177 LBS

## 2022-10-25 DIAGNOSIS — R11.15 CYCLICAL VOMITING: ICD-10-CM

## 2022-10-25 PROBLEM — K22.10 EROSIVE ESOPHAGITIS: Status: ACTIVE | Noted: 2022-10-25

## 2022-10-25 LAB — PREGNANCY TEST URINE, POC: NEGATIVE

## 2022-10-25 PROCEDURE — 2709999900 HC NON-CHARGEABLE SUPPLY: Performed by: SPECIALIST

## 2022-10-25 PROCEDURE — 87077 CULTURE AEROBIC IDENTIFY: CPT

## 2022-10-25 PROCEDURE — 81025 URINE PREGNANCY TEST: CPT

## 2022-10-25 PROCEDURE — 3700000001 HC ADD 15 MINUTES (ANESTHESIA): Performed by: SPECIALIST

## 2022-10-25 PROCEDURE — 7100000010 HC PHASE II RECOVERY - FIRST 15 MIN: Performed by: SPECIALIST

## 2022-10-25 PROCEDURE — 6360000002 HC RX W HCPCS: Performed by: NURSE ANESTHETIST, CERTIFIED REGISTERED

## 2022-10-25 PROCEDURE — 7100000011 HC PHASE II RECOVERY - ADDTL 15 MIN: Performed by: SPECIALIST

## 2022-10-25 PROCEDURE — 2580000003 HC RX 258: Performed by: SPECIALIST

## 2022-10-25 PROCEDURE — 6360000002 HC RX W HCPCS: Performed by: SPECIALIST

## 2022-10-25 PROCEDURE — 3609012400 HC EGD TRANSORAL BIOPSY SINGLE/MULTIPLE: Performed by: SPECIALIST

## 2022-10-25 PROCEDURE — 43239 EGD BIOPSY SINGLE/MULTIPLE: CPT | Performed by: SPECIALIST

## 2022-10-25 PROCEDURE — 3700000000 HC ANESTHESIA ATTENDED CARE: Performed by: SPECIALIST

## 2022-10-25 RX ORDER — LIDOCAINE HYDROCHLORIDE 20 MG/ML
INJECTION, SOLUTION INTRAVENOUS PRN
Status: DISCONTINUED | OUTPATIENT
Start: 2022-10-25 | End: 2022-10-25 | Stop reason: SDUPTHER

## 2022-10-25 RX ORDER — PANTOPRAZOLE SODIUM 40 MG/1
40 TABLET, DELAYED RELEASE ORAL
Qty: 60 TABLET | Refills: 5 | Status: SHIPPED | OUTPATIENT
Start: 2022-10-25

## 2022-10-25 RX ORDER — PROPOFOL 10 MG/ML
INJECTION, EMULSION INTRAVENOUS PRN
Status: DISCONTINUED | OUTPATIENT
Start: 2022-10-25 | End: 2022-10-25 | Stop reason: SDUPTHER

## 2022-10-25 RX ORDER — DEXAMETHASONE SODIUM PHOSPHATE 4 MG/ML
INJECTION, SOLUTION INTRA-ARTICULAR; INTRALESIONAL; INTRAMUSCULAR; INTRAVENOUS; SOFT TISSUE PRN
Status: DISCONTINUED | OUTPATIENT
Start: 2022-10-25 | End: 2022-10-25 | Stop reason: SDUPTHER

## 2022-10-25 RX ORDER — METOCLOPRAMIDE 10 MG/1
10 TABLET ORAL
Qty: 90 TABLET | Refills: 5 | Status: SHIPPED | OUTPATIENT
Start: 2022-10-25

## 2022-10-25 RX ORDER — ONDANSETRON 2 MG/ML
4 INJECTION INTRAMUSCULAR; INTRAVENOUS ONCE
Status: COMPLETED | OUTPATIENT
Start: 2022-10-25 | End: 2022-10-25

## 2022-10-25 RX ORDER — SODIUM CHLORIDE, SODIUM LACTATE, POTASSIUM CHLORIDE, CALCIUM CHLORIDE 600; 310; 30; 20 MG/100ML; MG/100ML; MG/100ML; MG/100ML
INJECTION, SOLUTION INTRAVENOUS CONTINUOUS
Status: DISCONTINUED | OUTPATIENT
Start: 2022-10-25 | End: 2022-10-25 | Stop reason: HOSPADM

## 2022-10-25 RX ORDER — ONDANSETRON 2 MG/ML
INJECTION INTRAMUSCULAR; INTRAVENOUS PRN
Status: DISCONTINUED | OUTPATIENT
Start: 2022-10-25 | End: 2022-10-25 | Stop reason: SDUPTHER

## 2022-10-25 RX ADMIN — ONDANSETRON 4 MG: 2 INJECTION INTRAMUSCULAR; INTRAVENOUS at 13:05

## 2022-10-25 RX ADMIN — PROPOFOL 300 MG: 10 INJECTION, EMULSION INTRAVENOUS at 13:10

## 2022-10-25 RX ADMIN — SODIUM CHLORIDE, POTASSIUM CHLORIDE, SODIUM LACTATE AND CALCIUM CHLORIDE: 600; 310; 30; 20 INJECTION, SOLUTION INTRAVENOUS at 11:34

## 2022-10-25 RX ADMIN — DEXAMETHASONE SODIUM PHOSPHATE 4 MG: 4 INJECTION, SOLUTION INTRAMUSCULAR; INTRAVENOUS at 13:07

## 2022-10-25 RX ADMIN — LIDOCAINE HYDROCHLORIDE 100 MG: 20 INJECTION, SOLUTION INTRAVENOUS at 13:10

## 2022-10-25 RX ADMIN — ONDANSETRON 4 MG: 2 INJECTION INTRAMUSCULAR; INTRAVENOUS at 11:37

## 2022-10-25 ASSESSMENT — PAIN SCALES - GENERAL
PAINLEVEL_OUTOF10: 5
PAINLEVEL_OUTOF10: 5
PAINLEVEL_OUTOF10: 0

## 2022-10-25 ASSESSMENT — PAIN DESCRIPTION - LOCATION
LOCATION: THROAT

## 2022-10-25 ASSESSMENT — PAIN DESCRIPTION - PAIN TYPE
TYPE: CHRONIC PAIN;ACUTE PAIN
TYPE: ACUTE PAIN;CHRONIC PAIN
TYPE: ACUTE PAIN;CHRONIC PAIN

## 2022-10-25 ASSESSMENT — PAIN - FUNCTIONAL ASSESSMENT: PAIN_FUNCTIONAL_ASSESSMENT: NONE - DENIES PAIN

## 2022-10-25 NOTE — PROGRESS NOTES
Received report from Talya Hwang, [place]. Patient alert and oriented. Verified patient's name, , allergies and procedure. No beta blockers, no blood thinners, no implants. H&P on chart. Boyfriend, Nathalie Souza at bedside.

## 2022-10-25 NOTE — PROGRESS NOTES
1240 Pt up to bathroom with steady gait. Pt c/o heartburn and appears upset. 1245 Pt pacing in hallway by nurse's station. Vomited sm amt of bile emesis x 2 into commode in bathroom. Pt appears in emotional distress and states is having anxiety attack. Emotional support and reassurances given. Dr Hayden Butler notified of pt's condition.

## 2022-10-25 NOTE — PROGRESS NOTES
Patient is back to baseline. Alert and oriented. Report given to Maria E Nicole Deedee Scrivener at bedside.

## 2022-10-25 NOTE — DISCHARGE INSTRUCTIONS
EGD    _____    OFFICE [facility]OL[facility]N__[phone]____    FOLLOW UP APPOINTMENT IN ________DAYS/WEEKS OR AS NEEDED. REPEAT PROCEDURE IN AS  NEEDED. TEST ORDERED:NONE OR___________________________________________________________________. What to Expect at Home  Your Recovery:  The only discomfort after your EGD is generally limited to a mild soreness of the throat, which may last a day or two. Call your physician immediately if you have severe chest pain, shortness of breath or a temperature of 100 degrees or higher if taken orally. How can you care for yourself at home? Activity  Rest as much as you need to after you go home. You should be able to go back to your usual activities the day after the test.  Diet  Follow your doctor's directions for eating after the test.  Drink plenty of fluids (unless your doctor has told you not to). Medications  If you have a sore throat the day after the test, use an over-the-counter spray to numb your throat. Your doctor will tell you if and when you can restart your medicines. He or she will also give you instructions about taking any new medicines. If you take blood thinners, such as warfarin (Coumadin), clopidogrel (Plavix), or aspirin, be sure to talk to your doctor. He or she will tell you if and when to start taking those medicines again. Make sure that you understand exactly what your doctor wants you to do. If a biopsy was done during the test, your doctor may tell you not to take aspirin or other anti-inflammatory medicines for a few days. These include ibuprofen (Advil, Motrin) and naproxen (Aleve). DO NOT DRINK ANYTHING WITH ALCOHOL TODAY. Other instructions:Anesthesia  For your safety, do not drive or operate machinery for 24 hours. Do not sign legal documents or make major decisions for 24 hours. The anesthesia can make it hard for you to fully understand what you are agreeing to.       Follow-up care is a key part of your treatment and safety. Be sure to make and go to all appointments, and call your doctor if you are having problems. It's also a good idea to know your test results and keep a list of the medicines you take. When should you call for help? 1 [facility] [phone] OR formerly [facility] [phone]  Call 911 anytime you think you may need emergency care. For example, call if:  You passed out (lost consciousness). You cough up blood. You vomit blood or what looks like coffee grounds. You pass maroon or very bloody stools. Call your doctor now or seek immediate medical care if:  You have trouble swallowing. You have belly pain. Your stools are black and tarlike or have streaks of blood. You are sick to your stomach or cannot keep fluids down. Watch closely for changes in your health, and be sure to contact your doctor if:  Your throat still hurts after a day or two. You do not get better as expected. Where can you learn more? Go to https://[employer]. org and sign in to your [employer] account. Enter I884 in the [facility] box to learn more about Upper GI Endoscopy: What to Expect at Home.     If you do not have an account, please click on the Sign Up Now link. © 3884-2817 Healthwise, Incorporated. Care instructions adapted under license by [facility] [place] [facility] This care instruction is for use with your licensed healthcare professional. If you have questions about a medical condition or this instruction, always ask your healthcare professional. [address] any warranty or liability for your use of this information.   Content Version: 41.1.490936; Current as of: November 20, 2015

## 2022-10-25 NOTE — BRIEF OP NOTE
BRIEF EGD REPORT:     The original EGD report with photos in higher definition available by going to \"chart review\" then \"procedures\" then double click on \"EGD\"      Impression:         -  LA Grade C esophagitis. -  Medium-sized hiatal hernia. - Biopsies were taken of the gastric antrum to assay for H pylori by the            Ariella-Test method         -  Normal examined duodenum. Recommendation:         1) The patient definitely has erosive esophagitis- from GERD aggravated by            vomiting from gastroparesis. 2) She also demonstrated a high level of anxiety and apparent psychopathy which in my opinion is greatly contributing to her GI issues. 3) will maximize Rx of gastroparesis and GERD with BID PPI and Reglan 10 mg ac tid.                    4) She should have small particle, frequent small meals low in fat and fiber           5) Psychiatric consultation is suggested for better control of her psych issues and hopefully find a med program that does not interfere too much with GERD and gastroparesis (I.e. avoiding too much anticholinergic effect)

## 2022-10-25 NOTE — ANESTHESIA POSTPROCEDURE EVALUATION
Department of Anesthesiology  Postprocedure Note    Patient: John Mcdonough  MRN: [id number]  YOB: 1995  Date of evaluation: 10/25/2022      Procedure Summary     Date: 10/25/22 Room / Location: [facility] 12 05 / [facility]    Anesthesia Start: 2188 Anesthesia Stop: 1779    Procedure: EGD BIOPSY Diagnosis:       Cyclical vomiting      (Cyclical vomiting [T95.95])    Surgeons: Modesto Ramirez MD Responsible Provider: KARLA Douglas CRNA    Anesthesia Type: MAC ASA Status: 2          Anesthesia Type: No value filed.     Maggie Phase I: Maggie Score: 10    Maggie Phase II:  10      Anesthesia Post Evaluation    Patient location during evaluation: bedside  Patient participation: complete - patient participated  Level of consciousness: awake and alert  Pain score: 1  Airway patency: patent  Nausea & Vomiting: no nausea and no vomiting  Complications: no  Cardiovascular status: hemodynamically stable  Respiratory status: acceptable, room air, spontaneous ventilation and nonlabored ventilation  Hydration status: euvolemic

## 2022-10-25 NOTE — PROGRESS NOTES
1329  Pt back to pre/post room from Endo per cart. Pt is awake and alert--skin pink, W&D. Pt states that her earlier heartburn is gone now but has a very sore throat, partly from previous vomiting over the past 3 days. VS stable. Report received from [facility]. Pt given water to drink although she wanted to have coffee and was advised that coffee is not good for heartburn issues. 1340  Pt tolerating water well and wants to try some soda. 1350 Pt c/o being unable to drink soda and now c/o heartburn and nausea starting again. 1405  Pt up to bathroom and walking in [place] again asking about getting something for her heartburn. Pt insists on getting coffee to drink. Pt dressed to go home. 1415 Pt appears to be tolerating coffee w/o complaints. Dr Brunilda Nunez in to speak to her about procedure. [id number]  Pt and boyfriend instructed for discharge care and follow-up and use of Rx with understanding voiced. Pt ambulatory to exit with steady gait with boyfriend.

## 2022-10-27 LAB — CLOTEST: NEGATIVE

## 2022-11-19 DIAGNOSIS — F31.9 BIPOLAR 1 DISORDER (HCC): ICD-10-CM

## 2022-11-19 DIAGNOSIS — F17.200 SMOKER: ICD-10-CM

## 2022-11-21 RX ORDER — BUPROPION HYDROCHLORIDE 150 MG/1
TABLET, EXTENDED RELEASE ORAL
Qty: 60 TABLET | Refills: 1 | Status: SHIPPED | OUTPATIENT
Start: 2022-11-21

## 2022-11-30 ENCOUNTER — TELEPHONE (OUTPATIENT)
Dept: GASTROENTEROLOGY | Age: 27
End: 2022-11-30

## 2022-12-08 ENCOUNTER — TELEPHONE (OUTPATIENT)
Dept: GASTROENTEROLOGY | Age: 27
End: 2022-12-08

## 2022-12-08 NOTE — TELEPHONE ENCOUNTER
Attempted to call and inform pt we had a new written rx for protonix 40mg but the number in her chart was not a working number.

## 2023-04-12 ENCOUNTER — TELEPHONE (OUTPATIENT)
Dept: FAMILY MEDICINE CLINIC | Age: 28
End: 2023-04-12

## 2023-04-12 DIAGNOSIS — K22.10 EROSIVE ESOPHAGITIS: ICD-10-CM

## 2023-04-12 DIAGNOSIS — R11.15 CYCLIC VOMITING SYNDROME: Primary | ICD-10-CM

## (undated) DEVICE — FORCEPS ENDOSCP L230CM WRK CHN 2.8CM SHTH 2.4MM JAW OPN

## (undated) DEVICE — ENDOSCOPY KIT: Brand: MEDLINE INDUSTRIES, INC.

## (undated) DEVICE — Z DISCONTINUED (USE MFG CAT MVABO)  TUBING GAS SAMPLING STD 6.5 FT FEMALE CONN SMRT CAPNOLINE